# Patient Record
Sex: FEMALE | Race: WHITE | NOT HISPANIC OR LATINO | URBAN - METROPOLITAN AREA
[De-identification: names, ages, dates, MRNs, and addresses within clinical notes are randomized per-mention and may not be internally consistent; named-entity substitution may affect disease eponyms.]

---

## 2018-01-15 ENCOUNTER — EMERGENCY (EMERGENCY)
Facility: HOSPITAL | Age: 33
LOS: 1 days | Discharge: HOME | End: 2018-01-15
Admitting: EMERGENCY MEDICINE
Payer: COMMERCIAL

## 2018-01-15 VITALS
DIASTOLIC BLOOD PRESSURE: 87 MMHG | RESPIRATION RATE: 16 BRPM | TEMPERATURE: 98 F | SYSTOLIC BLOOD PRESSURE: 131 MMHG | HEART RATE: 75 BPM | OXYGEN SATURATION: 100 %

## 2018-01-15 VITALS
SYSTOLIC BLOOD PRESSURE: 155 MMHG | DIASTOLIC BLOOD PRESSURE: 99 MMHG | RESPIRATION RATE: 18 BRPM | HEART RATE: 74 BPM | OXYGEN SATURATION: 98 % | TEMPERATURE: 99 F

## 2018-01-15 LAB
APPEARANCE UR: CLEAR — SIGNIFICANT CHANGE UP
BILIRUB UR-MCNC: NEGATIVE — SIGNIFICANT CHANGE UP
COLOR SPEC: COLORLESS — SIGNIFICANT CHANGE UP
DIFF PNL FLD: NEGATIVE — SIGNIFICANT CHANGE UP
GLUCOSE UR QL: NEGATIVE — SIGNIFICANT CHANGE UP
KETONES UR-MCNC: NEGATIVE — SIGNIFICANT CHANGE UP
LEUKOCYTE ESTERASE UR-ACNC: NEGATIVE — SIGNIFICANT CHANGE UP
NITRITE UR-MCNC: NEGATIVE — SIGNIFICANT CHANGE UP
PH UR: 6 — SIGNIFICANT CHANGE UP (ref 5–8)
PROT UR-MCNC: NEGATIVE — SIGNIFICANT CHANGE UP
SP GR SPEC: 1 — LOW (ref 1.01–1.02)
UROBILINOGEN FLD QL: NEGATIVE — SIGNIFICANT CHANGE UP

## 2018-01-15 PROCEDURE — 99284 EMERGENCY DEPT VISIT MOD MDM: CPT | Mod: 25

## 2018-01-15 PROCEDURE — 81003 URINALYSIS AUTO W/O SCOPE: CPT

## 2018-01-15 PROCEDURE — 99283 EMERGENCY DEPT VISIT LOW MDM: CPT

## 2018-01-15 RX ORDER — CYCLOBENZAPRINE HYDROCHLORIDE 10 MG/1
1 TABLET, FILM COATED ORAL
Qty: 21 | Refills: 0
Start: 2018-01-15 | End: 2018-01-21

## 2018-01-15 RX ORDER — LIDOCAINE 4 G/100G
1 CREAM TOPICAL
Qty: 1 | Refills: 0
Start: 2018-01-15 | End: 2018-01-21

## 2018-01-15 RX ORDER — ACETAMINOPHEN 500 MG
650 TABLET ORAL ONCE
Qty: 0 | Refills: 0 | Status: COMPLETED | OUTPATIENT
Start: 2018-01-15 | End: 2018-01-15

## 2018-01-15 RX ORDER — LIDOCAINE 4 G/100G
1 CREAM TOPICAL ONCE
Qty: 0 | Refills: 0 | Status: COMPLETED | OUTPATIENT
Start: 2018-01-15 | End: 2018-01-15

## 2018-01-15 RX ADMIN — Medication 650 MILLIGRAM(S): at 02:42

## 2018-01-15 RX ADMIN — LIDOCAINE 1 PATCH: 4 CREAM TOPICAL at 03:30

## 2018-01-15 NOTE — ED ADULT NURSE NOTE - OBJECTIVE STATEMENT
32 year old female alert and oriented x 4 came to the ED c/o pain in the middle of her back on the right side.  Patient said she has had this pain for a few months. but it worsened tonight and she was unable to sleep.  Patient said she took  one Naproxen around 2100, 2-5mg flexeril tablets, applied heat and sat in a massage chair with slight relief of pain.  Patient reported pain was 10/10, but at time of assessment pain was 5/10 stabbing pain.  Patient's abdomen is soft and is tender in the RUQ on palpation with pain radiating to the right mid back, no CVA tenderness.  Patient denies; chest pain, shortness of breath, nausea, fevers, chills, pain or burning on urination, injury, numbness or tingling in the extremities.  Patient said pain shoots down the right legs as well and she vomited one time today secondary to pain.  Safety ensured.

## 2018-01-15 NOTE — ED PROVIDER NOTE - MEDICAL DECISION MAKING DETAILS
32F presenting with back pain - likely MSK pain with increased muscle intensity. Will check urine to check for blood. Could be kidney stone.

## 2018-01-15 NOTE — ED PROVIDER NOTE - OBJECTIVE STATEMENT
32F presenting with back pain despite the triage note. No injury to the back but has chronic back pain, usually resolved by massage and Naproxen or Aleieve. Pt did try Naproxen today with some relief. Pain is at lower back, associated with muscle spasm. Pt has nausea from pain.  Also had one episode of vomiting. No fever or chill. No diarrhea or constipation. Pt took Cyclobenzaprine before coming to ED. LMP was this past week.

## 2018-01-15 NOTE — ED PROVIDER NOTE - ATTENDING CONTRIBUTION TO CARE
I was physically present for the E/M service provided. I agree with above history, physical, and plan which I have reviewed and edited where appropriate. I was physically present for the key portions of the service provided.    32F p/w atraumatic LBP. No trauma. No fever. No mid-line TTP. No LE weakness. No paresthesia No bowel/bladder incontinence. Ambulatory. Supportive care with NSAIDs, muscle relaxers, stretching and hot compress. f/u PCP/Spine for PT. UA negative for overt UTI.

## 2022-03-21 PROBLEM — Z00.00 ENCOUNTER FOR PREVENTIVE HEALTH EXAMINATION: Status: ACTIVE | Noted: 2022-03-21

## 2022-04-08 ENCOUNTER — TRANSCRIPTION ENCOUNTER (OUTPATIENT)
Age: 37
End: 2022-04-08

## 2022-04-08 ENCOUNTER — APPOINTMENT (OUTPATIENT)
Dept: GASTROENTEROLOGY | Facility: CLINIC | Age: 37
End: 2022-04-08
Payer: COMMERCIAL

## 2022-04-08 VITALS
HEIGHT: 66 IN | WEIGHT: 222 LBS | SYSTOLIC BLOOD PRESSURE: 130 MMHG | DIASTOLIC BLOOD PRESSURE: 90 MMHG | BODY MASS INDEX: 35.68 KG/M2

## 2022-04-08 PROCEDURE — 99203 OFFICE O/P NEW LOW 30 MIN: CPT | Mod: 25

## 2022-04-08 NOTE — HISTORY OF PRESENT ILLNESS
[Heartburn] : denies heartburn [Nausea] : denies nausea [Vomiting] : denies vomiting [Diarrhea] : denies diarrhea [Constipation] : stable constipation [Yellow Skin Or Eyes (Jaundice)] : denies jaundice [Abdominal Pain] : denies abdominal pain [Abdominal Swelling] : denies abdominal swelling [Wt Gain ___ Lbs] : no recent weight gain [Wt Loss ___ Lbs] : no recent weight loss [GERD] : no gastroesophageal reflux disease [Hiatus Hernia] : no hiatus hernia [Peptic Ulcer Disease] : no peptic ulcer disease [Pancreatitis] : no pancreatitis [Cholelithiasis] : no cholelithiasis [Kidney Stone] : no kidney stone [Inflammatory Bowel Disease] : no inflammatory bowel disease [Irritable Bowel Syndrome] : no irritable bowel syndrome [Diverticulitis] : no diverticulitis [Alcohol Abuse] : no alcohol abuse [Malignancy] : no malignancy [Abdominal Surgery] : no abdominal surgery [Appendectomy] : no appendectomy [Cholecystectomy] : no cholecystectomy [de-identified] : 37 yo female, referred by Dr. Fowler for evaluation of constipation.Moves bowels 2-3 days a week.  Uses suppositories to obtain a bm, uses occasional "smooth move."  Uses occ OTC laxatives. Has no urge to move bowels.  There is no rectal bleeding or family history colorectal cancer.  She has occasional hemorrhoidal irritation

## 2022-04-08 NOTE — ASSESSMENT
[FreeTextEntry1] : 37 yo female, referred by Dr. Fowler for evaluation of constipation.Moves bowels 2-3 days a week.  Uses suppositories to obtain a bm, uses occasional "smooth move."  Uses occ OTC laxatives. Has no urge to move bowels.  There is no rectal bleeding or family history colorectal cancer.  She has occasional hemorrhoidal irritation\par \par We discussed for her to increase the fiber and fluid in her diet although she states she drinks "a lot of water".  I do not think she has any rectocele but she is never given birth of had pelvic surgery.\par \par We will prescribe Linzess await routine laboratory test and see her in follow-up in 4 weeks.  If there is no improvement consideration will be given for colonoscopy at that time.

## 2022-04-08 NOTE — CONSULT LETTER
[Dear  ___] : Dear  [unfilled], [Consult Letter:] : I had the pleasure of evaluating your patient, [unfilled]. [Please see my note below.] : Please see my note below. [Consult Closing:] : Thank you very much for allowing me to participate in the care of this patient.  If you have any questions, please do not hesitate to contact me. [Sincerely,] : Sincerely, [FreeTextEntry3] : Juan David Duran MD, FACP, AGAF, FAASLD\par  of Medicine\par [facility]\par

## 2022-04-10 ENCOUNTER — TRANSCRIPTION ENCOUNTER (OUTPATIENT)
Age: 37
End: 2022-04-10

## 2022-04-10 LAB
ALBUMIN SERPL ELPH-MCNC: 4.5 G/DL
ALP BLD-CCNC: 69 U/L
ALT SERPL-CCNC: 27 U/L
ANION GAP SERPL CALC-SCNC: 13 MMOL/L
AST SERPL-CCNC: 22 U/L
BASOPHILS # BLD AUTO: 0.05 K/UL
BASOPHILS NFR BLD AUTO: 0.6 %
BILIRUB SERPL-MCNC: <0.2 MG/DL
BUN SERPL-MCNC: 14 MG/DL
CALCIUM SERPL-MCNC: 9 MG/DL
CHLORIDE SERPL-SCNC: 103 MMOL/L
CO2 SERPL-SCNC: 23 MMOL/L
CREAT SERPL-MCNC: 0.73 MG/DL
EGFR: 111 ML/MIN/1.73M2
EOSINOPHIL # BLD AUTO: 0.11 K/UL
EOSINOPHIL NFR BLD AUTO: 1.4 %
ESTIMATED AVERAGE GLUCOSE: 126 MG/DL
GLUCOSE SERPL-MCNC: 110 MG/DL
HBA1C MFR BLD HPLC: 6 %
HCT VFR BLD CALC: 40.7 %
HGB BLD-MCNC: 13.5 G/DL
IMM GRANULOCYTES NFR BLD AUTO: 0.2 %
LYMPHOCYTES # BLD AUTO: 3.35 K/UL
LYMPHOCYTES NFR BLD AUTO: 41.5 %
MAN DIFF?: NORMAL
MCHC RBC-ENTMCNC: 28.7 PG
MCHC RBC-ENTMCNC: 33.2 GM/DL
MCV RBC AUTO: 86.6 FL
MONOCYTES # BLD AUTO: 0.55 K/UL
MONOCYTES NFR BLD AUTO: 6.8 %
NEUTROPHILS # BLD AUTO: 3.99 K/UL
NEUTROPHILS NFR BLD AUTO: 49.5 %
PLATELET # BLD AUTO: 335 K/UL
POTASSIUM SERPL-SCNC: 4.1 MMOL/L
PROT SERPL-MCNC: 7 G/DL
RBC # BLD: 4.7 M/UL
RBC # FLD: 11.9 %
SODIUM SERPL-SCNC: 139 MMOL/L
TSH SERPL-ACNC: 1.25 UIU/ML
WBC # FLD AUTO: 8.07 K/UL

## 2022-05-10 ENCOUNTER — APPOINTMENT (OUTPATIENT)
Dept: GASTROENTEROLOGY | Facility: CLINIC | Age: 37
End: 2022-05-10
Payer: COMMERCIAL

## 2022-05-10 VITALS
HEIGHT: 66 IN | WEIGHT: 226 LBS | DIASTOLIC BLOOD PRESSURE: 76 MMHG | SYSTOLIC BLOOD PRESSURE: 118 MMHG | BODY MASS INDEX: 36.32 KG/M2

## 2022-05-10 VITALS — RESPIRATION RATE: 12 BRPM | HEART RATE: 70 BPM

## 2022-05-10 PROCEDURE — 99214 OFFICE O/P EST MOD 30 MIN: CPT

## 2022-05-10 NOTE — CONSULT LETTER
[Dear  ___] : Dear  [unfilled], [Courtesy Letter:] : I had the pleasure of seeing your patient, [unfilled], in my office today. [Please see my note below.] : Please see my note below. [Consult Closing:] : Thank you very much for allowing me to participate in the care of this patient.  If you have any questions, please do not hesitate to contact me. [Sincerely,] : Sincerely, [FreeTextEntry3] : Juan David Duran MD, FACP, AGAF, FAASLD\par  of Medicine\par [facility]\par

## 2022-05-10 NOTE — HISTORY OF PRESENT ILLNESS
[Heartburn] : denies heartburn [Nausea] : denies nausea [Vomiting] : denies vomiting [Diarrhea] : denies diarrhea [Constipation] : stable constipation [Yellow Skin Or Eyes (Jaundice)] : denies jaundice [Abdominal Pain] : denies abdominal pain [Abdominal Swelling] : denies abdominal swelling [Wt Gain ___ Lbs] : no recent weight gain [Wt Loss ___ Lbs] : no recent weight loss [GERD] : no gastroesophageal reflux disease [Hiatus Hernia] : no hiatus hernia [Peptic Ulcer Disease] : no peptic ulcer disease [Pancreatitis] : no pancreatitis [Cholelithiasis] : no cholelithiasis [Kidney Stone] : no kidney stone [Inflammatory Bowel Disease] : no inflammatory bowel disease [Irritable Bowel Syndrome] : no irritable bowel syndrome [Diverticulitis] : no diverticulitis [Alcohol Abuse] : no alcohol abuse [Malignancy] : no malignancy [Abdominal Surgery] : no abdominal surgery [Appendectomy] : no appendectomy [Cholecystectomy] : no cholecystectomy [de-identified] : 37 yo female, referred by Dr. Fowler for evaluation of constipation.Moves bowels 2-3 days a week.  Uses suppositories to obtain a bm, uses occasional "smooth move."  Uses occ OTC laxatives. Has no urge to move bowels.  There is no rectal bleeding or family history colorectal cancer.  She has occasional hemorrhoidal irritation\par \par Used Linzess 72 ug with one episode of incontinence.

## 2022-05-10 NOTE — HISTORY OF PRESENT ILLNESS
[Heartburn] : denies heartburn [Nausea] : denies nausea [Vomiting] : denies vomiting [Diarrhea] : denies diarrhea [Constipation] : stable constipation [Yellow Skin Or Eyes (Jaundice)] : denies jaundice [Abdominal Pain] : denies abdominal pain [Abdominal Swelling] : denies abdominal swelling [Wt Gain ___ Lbs] : no recent weight gain [Wt Loss ___ Lbs] : no recent weight loss [GERD] : no gastroesophageal reflux disease [Hiatus Hernia] : no hiatus hernia [Peptic Ulcer Disease] : no peptic ulcer disease [Pancreatitis] : no pancreatitis [Cholelithiasis] : no cholelithiasis [Kidney Stone] : no kidney stone [Inflammatory Bowel Disease] : no inflammatory bowel disease [Irritable Bowel Syndrome] : no irritable bowel syndrome [Diverticulitis] : no diverticulitis [Alcohol Abuse] : no alcohol abuse [Malignancy] : no malignancy [Abdominal Surgery] : no abdominal surgery [Appendectomy] : no appendectomy [Cholecystectomy] : no cholecystectomy [de-identified] : 35 yo female, referred by Dr. Fowler for evaluation of constipation.Moves bowels 2-3 days a week.  Uses suppositories to obtain a bm, uses occasional "smooth move."  Uses occ OTC laxatives. Has no urge to move bowels.  There is no rectal bleeding or family history colorectal cancer.  She has occasional hemorrhoidal irritation\par \par Used Linzess 72 ug with one episode of incontinence.

## 2022-05-10 NOTE — ED ADULT NURSE NOTE - NSSISCREENINGQ1_ED_A_ED
----- Message from Rebecca Roman MD sent at 5/7/2022  1:03 PM CDT -----  Please inform patient that the recent labs shows vit D is low andd she needs to take vit D 2000 units daily and get her an appt for physical    Thank you very much.      
----- Message from Rebecca Roman MD sent at 5/7/2022  1:03 PM CDT -----  Please inform patient that the recent labs shows vit D is low andd she needs to take vit D 2000 units daily and get her an appt for physical    Thank you very much.      
Spoke w/ pt. Relayed information below. Pt verbalized understanding.  
No

## 2022-05-10 NOTE — CONSULT LETTER
[Dear  ___] : Dear  [unfilled], [Courtesy Letter:] : I had the pleasure of seeing your patient, [unfilled], in my office today. [Please see my note below.] : Please see my note below. [Consult Closing:] : Thank you very much for allowing me to participate in the care of this patient.  If you have any questions, please do not hesitate to contact me. [Sincerely,] : Sincerely, [FreeTextEntry3] : Juan David Duran MD, FACP, AGAF, FAASLD\par  of Medicine\par [facility]\par

## 2022-09-07 ENCOUNTER — EMERGENCY (EMERGENCY)
Facility: HOSPITAL | Age: 37
LOS: 1 days | Discharge: HOME | End: 2022-09-07
Attending: STUDENT IN AN ORGANIZED HEALTH CARE EDUCATION/TRAINING PROGRAM
Payer: COMMERCIAL

## 2022-09-07 ENCOUNTER — APPOINTMENT (OUTPATIENT)
Dept: ORTHOPEDIC SURGERY | Facility: CLINIC | Age: 37
End: 2022-09-07

## 2022-09-07 VITALS
WEIGHT: 220.02 LBS | DIASTOLIC BLOOD PRESSURE: 95 MMHG | HEART RATE: 108 BPM | RESPIRATION RATE: 18 BRPM | HEIGHT: 66 IN | SYSTOLIC BLOOD PRESSURE: 146 MMHG | TEMPERATURE: 98 F | OXYGEN SATURATION: 99 %

## 2022-09-07 VITALS
SYSTOLIC BLOOD PRESSURE: 123 MMHG | RESPIRATION RATE: 18 BRPM | DIASTOLIC BLOOD PRESSURE: 78 MMHG | OXYGEN SATURATION: 99 % | HEART RATE: 82 BPM

## 2022-09-07 VITALS — BODY MASS INDEX: 35.36 KG/M2 | WEIGHT: 220 LBS | HEIGHT: 66 IN

## 2022-09-07 LAB
ALBUMIN SERPL ELPH-MCNC: 4.5 G/DL — SIGNIFICANT CHANGE UP (ref 3.3–5)
ALP SERPL-CCNC: 61 U/L — SIGNIFICANT CHANGE UP (ref 40–120)
ALT FLD-CCNC: 16 U/L — SIGNIFICANT CHANGE UP (ref 10–45)
ANION GAP SERPL CALC-SCNC: 13 MMOL/L — SIGNIFICANT CHANGE UP (ref 5–17)
APTT BLD: 29 SEC — SIGNIFICANT CHANGE UP (ref 27.5–35.5)
AST SERPL-CCNC: 15 U/L — SIGNIFICANT CHANGE UP (ref 10–40)
BASOPHILS # BLD AUTO: 0.07 K/UL — SIGNIFICANT CHANGE UP (ref 0–0.2)
BASOPHILS NFR BLD AUTO: 0.5 % — SIGNIFICANT CHANGE UP (ref 0–2)
BILIRUB SERPL-MCNC: 0.4 MG/DL — SIGNIFICANT CHANGE UP (ref 0.2–1.2)
BUN SERPL-MCNC: 16 MG/DL — SIGNIFICANT CHANGE UP (ref 7–23)
CALCIUM SERPL-MCNC: 9.1 MG/DL — SIGNIFICANT CHANGE UP (ref 8.4–10.5)
CHLORIDE SERPL-SCNC: 96 MMOL/L — SIGNIFICANT CHANGE UP (ref 96–108)
CO2 SERPL-SCNC: 27 MMOL/L — SIGNIFICANT CHANGE UP (ref 22–31)
CREAT SERPL-MCNC: 0.96 MG/DL — SIGNIFICANT CHANGE UP (ref 0.5–1.3)
EGFR: 79 ML/MIN/1.73M2 — SIGNIFICANT CHANGE UP
EOSINOPHIL # BLD AUTO: 0.12 K/UL — SIGNIFICANT CHANGE UP (ref 0–0.5)
EOSINOPHIL NFR BLD AUTO: 0.9 % — SIGNIFICANT CHANGE UP (ref 0–6)
FLUAV AG NPH QL: SIGNIFICANT CHANGE UP
FLUBV AG NPH QL: SIGNIFICANT CHANGE UP
GLUCOSE SERPL-MCNC: 129 MG/DL — HIGH (ref 70–99)
HCT VFR BLD CALC: 40.7 % — SIGNIFICANT CHANGE UP (ref 34.5–45)
HGB BLD-MCNC: 13.6 G/DL — SIGNIFICANT CHANGE UP (ref 11.5–15.5)
HIV 1+2 AB+HIV1 P24 AG SERPL QL IA: SIGNIFICANT CHANGE UP
IMM GRANULOCYTES NFR BLD AUTO: 0.7 % — SIGNIFICANT CHANGE UP (ref 0–1.5)
INR BLD: 1.15 RATIO — SIGNIFICANT CHANGE UP (ref 0.88–1.16)
LYMPHOCYTES # BLD AUTO: 25.5 % — SIGNIFICANT CHANGE UP (ref 13–44)
LYMPHOCYTES # BLD AUTO: 3.38 K/UL — HIGH (ref 1–3.3)
MCHC RBC-ENTMCNC: 28.7 PG — SIGNIFICANT CHANGE UP (ref 27–34)
MCHC RBC-ENTMCNC: 33.4 GM/DL — SIGNIFICANT CHANGE UP (ref 32–36)
MCV RBC AUTO: 85.9 FL — SIGNIFICANT CHANGE UP (ref 80–100)
MONOCYTES # BLD AUTO: 0.78 K/UL — SIGNIFICANT CHANGE UP (ref 0–0.9)
MONOCYTES NFR BLD AUTO: 5.9 % — SIGNIFICANT CHANGE UP (ref 2–14)
NEUTROPHILS # BLD AUTO: 8.8 K/UL — HIGH (ref 1.8–7.4)
NEUTROPHILS NFR BLD AUTO: 66.5 % — SIGNIFICANT CHANGE UP (ref 43–77)
NRBC # BLD: 0 /100 WBCS — SIGNIFICANT CHANGE UP (ref 0–0)
PLATELET # BLD AUTO: 301 K/UL — SIGNIFICANT CHANGE UP (ref 150–400)
POTASSIUM SERPL-MCNC: 3.5 MMOL/L — SIGNIFICANT CHANGE UP (ref 3.5–5.3)
POTASSIUM SERPL-SCNC: 3.5 MMOL/L — SIGNIFICANT CHANGE UP (ref 3.5–5.3)
PROT SERPL-MCNC: 7.2 G/DL — SIGNIFICANT CHANGE UP (ref 6–8.3)
PROTHROM AB SERPL-ACNC: 13.4 SEC — SIGNIFICANT CHANGE UP (ref 10.5–13.4)
RBC # BLD: 4.74 M/UL — SIGNIFICANT CHANGE UP (ref 3.8–5.2)
RBC # FLD: 12.3 % — SIGNIFICANT CHANGE UP (ref 10.3–14.5)
RSV RNA NPH QL NAA+NON-PROBE: SIGNIFICANT CHANGE UP
SARS-COV-2 RNA SPEC QL NAA+PROBE: SIGNIFICANT CHANGE UP
SODIUM SERPL-SCNC: 136 MMOL/L — SIGNIFICANT CHANGE UP (ref 135–145)
WBC # BLD: 13.24 K/UL — HIGH (ref 3.8–10.5)
WBC # FLD AUTO: 13.24 K/UL — HIGH (ref 3.8–10.5)

## 2022-09-07 PROCEDURE — 85025 COMPLETE CBC W/AUTO DIFF WBC: CPT

## 2022-09-07 PROCEDURE — 87637 SARSCOV2&INF A&B&RSV AMP PRB: CPT

## 2022-09-07 PROCEDURE — 85610 PROTHROMBIN TIME: CPT

## 2022-09-07 PROCEDURE — 99284 EMERGENCY DEPT VISIT MOD MDM: CPT

## 2022-09-07 PROCEDURE — 87389 HIV-1 AG W/HIV-1&-2 AB AG IA: CPT

## 2022-09-07 PROCEDURE — 96374 THER/PROPH/DIAG INJ IV PUSH: CPT

## 2022-09-07 PROCEDURE — 99284 EMERGENCY DEPT VISIT MOD MDM: CPT | Mod: 25

## 2022-09-07 PROCEDURE — 80053 COMPREHEN METABOLIC PANEL: CPT

## 2022-09-07 PROCEDURE — 85730 THROMBOPLASTIN TIME PARTIAL: CPT

## 2022-09-07 PROCEDURE — 99204 OFFICE O/P NEW MOD 45 MIN: CPT

## 2022-09-07 RX ORDER — OXYCODONE HYDROCHLORIDE 5 MG/1
1 TABLET ORAL
Qty: 9 | Refills: 0
Start: 2022-09-07

## 2022-09-07 RX ORDER — OXYCODONE HYDROCHLORIDE 5 MG/1
5 TABLET ORAL ONCE
Refills: 0 | Status: DISCONTINUED | OUTPATIENT
Start: 2022-09-07 | End: 2022-09-07

## 2022-09-07 RX ORDER — DIAZEPAM 5 MG
5 TABLET ORAL ONCE
Refills: 0 | Status: DISCONTINUED | OUTPATIENT
Start: 2022-09-07 | End: 2022-09-07

## 2022-09-07 RX ORDER — ONDANSETRON 8 MG/1
4 TABLET, FILM COATED ORAL ONCE
Refills: 0 | Status: COMPLETED | OUTPATIENT
Start: 2022-09-07 | End: 2022-09-07

## 2022-09-07 RX ORDER — MORPHINE SULFATE 50 MG/1
2 CAPSULE, EXTENDED RELEASE ORAL ONCE
Refills: 0 | Status: DISCONTINUED | OUTPATIENT
Start: 2022-09-07 | End: 2022-09-07

## 2022-09-07 RX ORDER — IBUPROFEN 200 MG
600 TABLET ORAL ONCE
Refills: 0 | Status: COMPLETED | OUTPATIENT
Start: 2022-09-07 | End: 2022-09-07

## 2022-09-07 RX ORDER — CYCLOBENZAPRINE HYDROCHLORIDE 10 MG/1
1 TABLET, FILM COATED ORAL
Qty: 12 | Refills: 0
Start: 2022-09-07

## 2022-09-07 RX ADMIN — Medication 5 MILLIGRAM(S): at 09:13

## 2022-09-07 RX ADMIN — Medication 600 MILLIGRAM(S): at 09:13

## 2022-09-07 RX ADMIN — MORPHINE SULFATE 2 MILLIGRAM(S): 50 CAPSULE, EXTENDED RELEASE ORAL at 12:16

## 2022-09-07 RX ADMIN — MORPHINE SULFATE 2 MILLIGRAM(S): 50 CAPSULE, EXTENDED RELEASE ORAL at 12:45

## 2022-09-07 RX ADMIN — OXYCODONE HYDROCHLORIDE 5 MILLIGRAM(S): 5 TABLET ORAL at 09:13

## 2022-09-07 RX ADMIN — OXYCODONE HYDROCHLORIDE 5 MILLIGRAM(S): 5 TABLET ORAL at 09:35

## 2022-09-07 RX ADMIN — ONDANSETRON 4 MILLIGRAM(S): 8 TABLET, FILM COATED ORAL at 11:12

## 2022-09-07 RX ADMIN — Medication 600 MILLIGRAM(S): at 09:35

## 2022-09-07 NOTE — ED ADULT NURSE NOTE - NSIMPLEMENTINTERV_GEN_ALL_ED
Implemented All Fall Risk Interventions:  [place] to call system. Call bell, personal items and telephone within reach. Instruct patient to call for assistance. Room bathroom lighting operational. Non-slip footwear when patient is off stretcher. Physically safe environment: no spills, clutter or unnecessary equipment. Stretcher in lowest position, wheels locked, appropriate side rails in place. Provide visual cue, wrist band, yellow gown, etc. Monitor gait and stability. Monitor for mental status changes and reorient to person, place, and time. Review medications for side effects contributing to fall risk. Reinforce activity limits and safety measures with patient and family.

## 2022-09-07 NOTE — ADDENDUM
[FreeTextEntry1] : This note was written by Ralph Saucedo on 09/07/2022 acting as scribe for Dr. Jose Luis Jordan M.D.\par \par I, Jose Luis Jordan MD, have read and attest that all the information, medical decision making and discharge instructions within are true and accurate.
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
## 2022-09-07 NOTE — ED PROVIDER NOTE - NSFOLLOWUPINSTRUCTIONS_ED_ALL_ED_FT
1. Follow with your PMD within 48-72 hours.    2. Rest, no heavy lifting.  Warm compresses to area. Ortho consult today at 215pm with Dr. Jordan at [address] [place] [address].  Continue all the home medications. Ibuprofen 600mg every 6 hours with food for pain.   3. Any worsening pain, weakness, numbness, bowel or urinary incontinence return to ER

## 2022-09-07 NOTE — ED ADULT NURSE NOTE - NS ED NURSE LEVEL OF CONSCIOUSNESS ORIENTATION
Patient had a procedure yesterday. She states that it is extremely difficult for her to urinate and is wondering if this is normal.     Thank you    Oriented - self; Oriented - place; Oriented - time

## 2022-09-07 NOTE — HISTORY OF PRESENT ILLNESS
[Stable] : stable [de-identified] : Pt is a 36 y/o female present for initial evaluation of lower back pain. \par Pt is a teacher, returns to work next week.\par Low back pain since June/July\par Seeing pain management.\par Has had 2 SNRB, 1st helped, 2nd did not help.\par Has had MRI. L5-S1 herniation.\par Went to [facility], given morphine.\par Has not tried chiropractic care, PT,  and acupuncture.\par Has not taken NSAIDs for pain.\par No fever chills sweats nausea vomiting no bowel or bladder dysfunction, no recent weight loss or gain no night pain. This history is in addition to the intake form that I personally reviewed.

## 2022-09-07 NOTE — ED PROVIDER NOTE - PROGRESS NOTE DETAILS
Attending (Jake Vogel D.O.):  Went to promptly eval patient once placed in a room, found to be standing in room, but patient requested some time before speaking to a provider. Attending (Jake Vogel D.O.):  Patient declined xray, CT for further eval of back pain. Original plan was reassessment after pain meds. Pain has been improving but patient adamantly requesting MRI. Explained that her physical exam w/o signs of acute spinal cord compression as she is able to ambulate w/o assistance, no lower extremity motor or sensory changes, bowel or bladder dysfunction, saddle anesthesia. Explained that we can admit her to the hospital or place in the observation unit or see if we can obtain an urgent outpatient ortho appointment. Per our refferalls coordinator, patient can be seen by Dr. Jordan today at 2:15pm which patient was agreeable with. Patient remained hemodyanmically stable. Strict return precautions given with verbal understanding expressed.

## 2022-09-07 NOTE — ED PROVIDER NOTE - ATTENDING APP SHARED VISIT CONTRIBUTION OF CARE
Attending (Jake Vogel D.O.):  I have personally seen and examined this patient. I have performed a substantive portion of the visit including all aspects of the medical decision making. Resident, fellow, and/or ACP note reviewed. I agree on the plan of care except where noted.    see mdm

## 2022-09-07 NOTE — PHYSICAL EXAM
[Normal] : Gait: normal [Arriaga's Sign] : negative Arriaga's sign [Pronator Drift] : negative pronator drift [SLR] : negative straight leg raise [de-identified] : 5 out of 5 motor strength, sensation is intact and symmetrical full range of motion flexion extension and rotation, no palpatory tenderness full range of motion of hips knees shoulders and elbows (all four extremities), no atrophy, negative straight leg raise, no pathological reflexes, no swelling, normal ambulation, no apparent distress skin is intact, no palpable lymph nodes, no upper or lower extremity instability, alert and oriented x3 and normal mood. Normal finger-to nose test.  No perineal or buttock numbness. [de-identified] : I reviewed, interpreted and clinically correlated the following outside imaging studies, \par Lumbar MRI July 2022\par L5-S1 herniation.

## 2022-09-07 NOTE — ED PROVIDER NOTE - NS ED ATTENDING STATEMENT MOD
This was a shared visit with the [facility]. I reviewed and verified the documentation and independently performed the documented:

## 2022-09-07 NOTE — ED PROVIDER NOTE - CLINICAL SUMMARY MEDICAL DECISION MAKING FREE TEXT BOX
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 Recent spinal injection for analgesia 5-6 days prior by Dr. Crowder, unsure what was injected, but patient feels pain worse after. Had a spinal injection 1 month prior which did improve pain. Bent down to get dog's ball today which may have triggered sxs. Denies lower extremity motor or sensory changes, saddles anesthesia, bowel/bladder incontinence. Denies infectious, urinary, GI sxs asides from chronic constipation. Tachycardic, likely from pain, otherwise hemodyanmically stable. Full str all 4 extrem w/ instact sensation. Steady but antalgic appearing gait. No midline spinal tenderness/stepoffs/deformities. No erythema, regions of fluctuance or crepitation. Suspect acute on chronic lower back pain. Patient w/ known pathology (herniated discs). No current indication for labs or advanced imaging. Will trial analgesics, antiinflam, muscle relaxants. Discussed w/ patient that the exam thus far does not indicate acute fracture. Patient requesting MR. Explained to patient and mom that given the exam thus far, an MRI is not warranted here in the emergency room. Patient w/ appointment w/ Dr. Patel for 3rd spinal injection on 9/15. Shared decision making: await analgesia and reexamination and continued discussion re: imaging and disposition.

## 2022-09-07 NOTE — ED PROVIDER NOTE - PHYSICAL EXAMINATION
Gen: AAO x 3, appears uncomfortable, standing  Skin: No rashes or lesions  HEENT: NC/AT, PERRLA, EOMI, MMM  Resp: unlabored CTAB  Cardiac: tachy s1s2 , no murmurs, rubs or gallops  GI: ND, +BS, Soft, NT  Ext: no pedal edema, FROM in all extremities; No midline tenderness or hematoma.   Neuro: able to ambulate in the room, attempted to get on bed but in pain, GARNER. nl sensation.

## 2022-09-07 NOTE — DISCUSSION/SUMMARY
[de-identified] : L5-S1 herniation.\par Discussed all options. \par She just had an epidural injection about 1 week ago and will follow-up with her pain management physician Dr. Crowder.\par Diclofenac.\par MRI referral.\par F/U after MRI.\par She will follow-up with a pain management physician for any narcotics and further treatment.\par We did discuss the possibly of surgery and I explained to the patient and mother that shaving down the herniated disc would help with her leg pain more than her back pain but her problem is back pain only today.\par To address her back pain she may require a fusion procedure and she like to hold off for now.\par All options discussed including rest, medicine, home exercise, acupuncture, Chiropractic care, Physical Therapy, Pain management, and last resort surgery. All questions were answered, all alternatives discussed and the patient is in complete agreement with that plan. Follow-up appointment as instructed. Any issues and the patient will call or come in sooner.
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
## 2022-09-07 NOTE — ED PROVIDER NOTE - NS ED ROS FT
Constitutional: No fever or chills  Eyes: No visual changes, eye pain or redness  HEENT: No throat pain, ear pain, nasal pain. No nose bleeding.  CV: No chest pain or lower extremity edema  Resp: No SOB no cough  GI: No abd pain. No nausea or vomiting. No diarrhea. No constipation.   : No dysuria, hematuria.   MSK: +++musculoskeletal pain  Skin: No rash  Neuro: No headache. No numbness or tingling. No weakness.

## 2022-09-07 NOTE — CHART NOTE - NSCHARTNOTEFT_GEN_A_CORE
This report was requested by: Jake Vogel | Reference #: [id number]    Others' Prescriptions  Patient Name: Sathish Waller YOB: 1985  Address: [address] [place] [id number]: Female  Rx Written	Rx Dispensed	Drug	Quantity	Days Supply	Prescriber Name	Prescriber Madelin #	Payment Method	Dispenser  07/21/2022	07/21/2022	acetaminophen-cod #3 tablet	30	7	Ju, Jose Carlos	[id number]	Insurance	[id number]  07/14/2022	07/14/2022	tramadol hcl 50 mg tablet	20	10	Ju, Jose Carlos	[id number]	Insurance	[id number]  07/07/2022	07/07/2022	alprazolam 0.5 mg tablet	20	20	Ki Tristan	[id number]	Insurance	[id number]  10/04/2021	10/06/2021	alprazolam 0.5 mg tablet	20	20	Otf Fowler	[id number]	Insurance	[id number]

## 2022-09-07 NOTE — ED PROVIDER NOTE - PATIENT PORTAL LINK FT
You can access the FollowMyHealth Patient Portal offered by [facility] by registering at the following website: [email or web address]. By joining [employer]’s FollowMyHealth portal, you will also be able to view your health information using other applications (apps) compatible with our system.

## 2022-09-08 ENCOUNTER — APPOINTMENT (OUTPATIENT)
Dept: SPINE | Facility: CLINIC | Age: 37
End: 2022-09-08

## 2022-09-08 VITALS
SYSTOLIC BLOOD PRESSURE: 115 MMHG | OXYGEN SATURATION: 97 % | HEIGHT: 66 IN | DIASTOLIC BLOOD PRESSURE: 83 MMHG | BODY MASS INDEX: 35.36 KG/M2 | HEART RATE: 103 BPM | WEIGHT: 220 LBS

## 2022-09-08 DIAGNOSIS — R11.0 NAUSEA: ICD-10-CM

## 2022-09-08 PROBLEM — M51.26 OTHER INTERVERTEBRAL DISC DISPLACEMENT, LUMBAR REGION: Chronic | Status: ACTIVE | Noted: 2022-09-07

## 2022-09-08 PROCEDURE — 99203 OFFICE O/P NEW LOW 30 MIN: CPT

## 2022-09-08 RX ORDER — ESCITALOPRAM OXALATE 5 MG/1
5 TABLET, FILM COATED ORAL
Refills: 0 | Status: DISCONTINUED | COMMUNITY
End: 2022-09-08

## 2022-09-08 RX ORDER — LINACLOTIDE 72 UG/1
72 CAPSULE, GELATIN COATED ORAL
Qty: 30 | Refills: 3 | Status: DISCONTINUED | COMMUNITY
Start: 2022-04-08 | End: 2022-09-08

## 2022-09-08 RX ORDER — LINACLOTIDE 145 UG/1
145 CAPSULE, GELATIN COATED ORAL
Qty: 90 | Refills: 3 | Status: DISCONTINUED | COMMUNITY
Start: 2022-05-16 | End: 2022-09-08

## 2022-09-08 NOTE — REVIEW OF SYSTEMS
[Difficulty Walking] : difficulty walking [Negative] : Heme/Lymph [Constipation] : constipation [Joint Pain] : joint pain [Limb Pain] : limb pain [As Noted in HPI] : as noted in HPI [de-identified] : pre diabetic

## 2022-09-08 NOTE — CONSULT LETTER
[Dear  ___] : Dear  [unfilled], [Consult Letter:] : I had the pleasure of evaluating your patient, [unfilled]. [Please see my note below.] : Please see my note below. [Consult Closing:] : Thank you very much for allowing me to participate in the care of this patient.  If you have any questions, please do not hesitate to contact me. [Sincerely,] : Sincerely, [FreeTextEntry2] : Otf Fowler M.D.\par [address]\par [place], [place]  [address] [FreeTextEntry1] : Sathish Walelr\par  1985 [FreeTextEntry3] : JASON Nino\par Nurse Practitioner\par Neurosurgery and Spine Department\par [facility]\par Nurse Practitioner in the office with Dr. Austin Patricio M.D.
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
## 2022-09-08 NOTE — ASSESSMENT
[FreeTextEntry1] : The images of the MRI were reviewed and discussed with the patient and her mother.  It was discussed that the disc may be putting pressure on the S1 nerve roots, causing her pain.  A new MRI of the lumbar spine was ordered to evaluated if there is any worsening of the stenosis.  She was provided with a prescription for Zofran to ease her nausea from the pain medication. Dr. Patricio was made aware and viewed the MRI.  The patient is to return to the office on Monday to discuss her plan of care.

## 2022-09-08 NOTE — PHYSICAL EXAM
[General Appearance - Alert] : alert [General Appearance - Well Nourished] : well nourished [General Appearance - Well Developed] : well developed [General Appearance - Well-Appearing] : healthy appearing [] : normal voice and communication [Oriented To Time, Place, And Person] : oriented to person, place, and time [Impaired Insight] : insight and judgment were intact [Affect] : the affect was normal [Mood] : the mood was normal [Memory Recent] : recent memory was not impaired [Memory Remote] : remote memory was not impaired [Person] : oriented to person [Place] : oriented to place [Time] : oriented to time [Short Term Intact] : short term memory intact [Remote Intact] : remote memory intact [Span Intact] : the attention span was normal [Concentration Intact] : normal concentrating ability [Fluency] : fluency intact [Comprehension] : comprehension intact [Current Events] : adequate knowledge of current events [Past History] : adequate knowledge of personal past history [Vocabulary] : adequate range of vocabulary [Cranial Nerves Optic (II)] : visual acuity intact bilaterally,  pupils equal round and reactive to light [Cranial Nerves Oculomotor (III)] : extraocular motion intact [Cranial Nerves Trigeminal (V)] : facial sensation intact symmetrically [Cranial Nerves Facial (VII)] : face symmetrical [Cranial Nerves Vestibulocochlear (VIII)] : hearing was intact bilaterally [Cranial Nerves Glossopharyngeal (IX)] : tongue and palate midline [Cranial Nerves Accessory (XI - Cranial And Spinal)] : head turning and shoulder shrug symmetric [Cranial Nerves Hypoglossal (XII)] : there was no tongue deviation with protrusion [Motor Tone] : muscle tone was normal in all four extremities [Motor Strength] : muscle strength was normal in all four extremities [No Muscle Atrophy] : normal bulk in all four extremities [5] : S1 toe walking 5/5 [Sensation Tactile Decrease] : light touch was intact [Balance] : balance was intact [Past-pointing] : there was no past-pointing [Tremor] : no tremor present [2+] : Patella left 2+ [1+] : Ankle jerk left 1+ [___] : absent on the right [___] : absent on the left [Arriaga] : Arriaga's sign was not demonstrated [FreeTextEntry1] : Pain with lifting left leg with knee bent and straight leg raising.  Pain with walking and difficulty sitting for any length of time. [FreeTextEntry8] : stiff gait related to back and left leg pain.

## 2022-09-08 NOTE — REASON FOR VISIT
[New Patient Visit] : a new patient visit [Referred By: _________] : Patient was referred by FRANK [Parent] : parent [FreeTextEntry1] : The patient reports having left sided back pain which radiates into her left buttock to her calf.

## 2022-09-08 NOTE — RESULTS/DATA
[FreeTextEntry1] : Large central herniated disc causing narrowing of the lateral recesses which is worse on the left than the right.

## 2022-09-08 NOTE — DATA REVIEWED
[de-identified] : Lumbar without contrast done at [facility] on 07/15/2022.

## 2022-09-09 ENCOUNTER — INPATIENT (INPATIENT)
Facility: HOSPITAL | Age: 37
LOS: 5 days | Discharge: HOME HEALTH | DRG: 520 | End: 2022-09-15
Attending: NEUROLOGICAL SURGERY | Admitting: INTERNAL MEDICINE
Payer: COMMERCIAL

## 2022-09-09 VITALS
TEMPERATURE: 98 F | HEART RATE: 92 BPM | HEIGHT: 66 IN | WEIGHT: 220.02 LBS | DIASTOLIC BLOOD PRESSURE: 89 MMHG | SYSTOLIC BLOOD PRESSURE: 147 MMHG | RESPIRATION RATE: 20 BRPM | OXYGEN SATURATION: 97 %

## 2022-09-09 DIAGNOSIS — M51.26 OTHER INTERVERTEBRAL DISC DISPLACEMENT, LUMBAR REGION: ICD-10-CM

## 2022-09-09 DIAGNOSIS — R09.89 OTHER SPECIFIED SYMPTOMS AND SIGNS INVOLVING THE CIRCULATORY AND RESPIRATORY SYSTEMS: ICD-10-CM

## 2022-09-09 LAB
ALBUMIN SERPL ELPH-MCNC: 4.2 G/DL — SIGNIFICANT CHANGE UP (ref 3.3–5)
ALP SERPL-CCNC: 60 U/L — SIGNIFICANT CHANGE UP (ref 40–120)
ALT FLD-CCNC: 15 U/L — SIGNIFICANT CHANGE UP (ref 10–45)
ANION GAP SERPL CALC-SCNC: 10 MMOL/L — SIGNIFICANT CHANGE UP (ref 5–17)
APTT BLD: 28 SEC — SIGNIFICANT CHANGE UP (ref 27.5–35.5)
AST SERPL-CCNC: 16 U/L — SIGNIFICANT CHANGE UP (ref 10–40)
BASOPHILS # BLD AUTO: 0.08 K/UL — SIGNIFICANT CHANGE UP (ref 0–0.2)
BASOPHILS NFR BLD AUTO: 0.7 % — SIGNIFICANT CHANGE UP (ref 0–2)
BILIRUB SERPL-MCNC: 0.3 MG/DL — SIGNIFICANT CHANGE UP (ref 0.2–1.2)
BLD GP AB SCN SERPL QL: NEGATIVE — SIGNIFICANT CHANGE UP
BUN SERPL-MCNC: 13 MG/DL — SIGNIFICANT CHANGE UP (ref 7–23)
CALCIUM SERPL-MCNC: 9.2 MG/DL — SIGNIFICANT CHANGE UP (ref 8.4–10.5)
CHLORIDE SERPL-SCNC: 100 MMOL/L — SIGNIFICANT CHANGE UP (ref 96–108)
CO2 SERPL-SCNC: 26 MMOL/L — SIGNIFICANT CHANGE UP (ref 22–31)
CREAT SERPL-MCNC: 0.8 MG/DL — SIGNIFICANT CHANGE UP (ref 0.5–1.3)
EGFR: 98 ML/MIN/1.73M2 — SIGNIFICANT CHANGE UP
EOSINOPHIL # BLD AUTO: 0.07 K/UL — SIGNIFICANT CHANGE UP (ref 0–0.5)
EOSINOPHIL NFR BLD AUTO: 0.6 % — SIGNIFICANT CHANGE UP (ref 0–6)
GLUCOSE SERPL-MCNC: 96 MG/DL — SIGNIFICANT CHANGE UP (ref 70–99)
HCG SERPL-ACNC: <2 MIU/ML — SIGNIFICANT CHANGE UP
HCT VFR BLD CALC: 41.6 % — SIGNIFICANT CHANGE UP (ref 34.5–45)
HGB BLD-MCNC: 13.7 G/DL — SIGNIFICANT CHANGE UP (ref 11.5–15.5)
IMM GRANULOCYTES NFR BLD AUTO: 0.9 % — SIGNIFICANT CHANGE UP (ref 0–1.5)
INR BLD: 1.12 RATIO — SIGNIFICANT CHANGE UP (ref 0.88–1.16)
LYMPHOCYTES # BLD AUTO: 2.9 K/UL — SIGNIFICANT CHANGE UP (ref 1–3.3)
LYMPHOCYTES # BLD AUTO: 24.8 % — SIGNIFICANT CHANGE UP (ref 13–44)
MCHC RBC-ENTMCNC: 28.5 PG — SIGNIFICANT CHANGE UP (ref 27–34)
MCHC RBC-ENTMCNC: 32.9 GM/DL — SIGNIFICANT CHANGE UP (ref 32–36)
MCV RBC AUTO: 86.7 FL — SIGNIFICANT CHANGE UP (ref 80–100)
MONOCYTES # BLD AUTO: 0.75 K/UL — SIGNIFICANT CHANGE UP (ref 0–0.9)
MONOCYTES NFR BLD AUTO: 6.4 % — SIGNIFICANT CHANGE UP (ref 2–14)
NEUTROPHILS # BLD AUTO: 7.8 K/UL — HIGH (ref 1.8–7.4)
NEUTROPHILS NFR BLD AUTO: 66.6 % — SIGNIFICANT CHANGE UP (ref 43–77)
NRBC # BLD: 0 /100 WBCS — SIGNIFICANT CHANGE UP (ref 0–0)
PLATELET # BLD AUTO: 302 K/UL — SIGNIFICANT CHANGE UP (ref 150–400)
POTASSIUM SERPL-MCNC: 3.9 MMOL/L — SIGNIFICANT CHANGE UP (ref 3.5–5.3)
POTASSIUM SERPL-SCNC: 3.9 MMOL/L — SIGNIFICANT CHANGE UP (ref 3.5–5.3)
PROT SERPL-MCNC: 7.2 G/DL — SIGNIFICANT CHANGE UP (ref 6–8.3)
PROTHROM AB SERPL-ACNC: 13 SEC — SIGNIFICANT CHANGE UP (ref 10.5–13.4)
RBC # BLD: 4.8 M/UL — SIGNIFICANT CHANGE UP (ref 3.8–5.2)
RBC # FLD: 12.2 % — SIGNIFICANT CHANGE UP (ref 10.3–14.5)
RH IG SCN BLD-IMP: POSITIVE — SIGNIFICANT CHANGE UP
SARS-COV-2 RNA SPEC QL NAA+PROBE: SIGNIFICANT CHANGE UP
SODIUM SERPL-SCNC: 136 MMOL/L — SIGNIFICANT CHANGE UP (ref 135–145)
WBC # BLD: 11.7 K/UL — HIGH (ref 3.8–10.5)
WBC # FLD AUTO: 11.7 K/UL — HIGH (ref 3.8–10.5)

## 2022-09-09 PROCEDURE — 93010 ELECTROCARDIOGRAM REPORT: CPT

## 2022-09-09 PROCEDURE — 72148 MRI LUMBAR SPINE W/O DYE: CPT | Mod: 26

## 2022-09-09 PROCEDURE — 99223 1ST HOSP IP/OBS HIGH 75: CPT

## 2022-09-09 PROCEDURE — 99285 EMERGENCY DEPT VISIT HI MDM: CPT

## 2022-09-09 RX ORDER — ACETAMINOPHEN 500 MG
650 TABLET ORAL EVERY 6 HOURS
Refills: 0 | Status: DISCONTINUED | OUTPATIENT
Start: 2022-09-09 | End: 2022-09-13

## 2022-09-09 RX ORDER — LIDOCAINE 4 G/100G
1 CREAM TOPICAL ONCE
Refills: 0 | Status: COMPLETED | OUTPATIENT
Start: 2022-09-09 | End: 2022-09-09

## 2022-09-09 RX ORDER — SENNA PLUS 8.6 MG/1
2 TABLET ORAL AT BEDTIME
Refills: 0 | Status: DISCONTINUED | OUTPATIENT
Start: 2022-09-09 | End: 2022-09-13

## 2022-09-09 RX ORDER — METHOCARBAMOL 500 MG/1
1500 TABLET, FILM COATED ORAL ONCE
Refills: 0 | Status: COMPLETED | OUTPATIENT
Start: 2022-09-09 | End: 2022-09-09

## 2022-09-09 RX ORDER — ENOXAPARIN SODIUM 100 MG/ML
40 INJECTION SUBCUTANEOUS EVERY 24 HOURS
Refills: 0 | Status: DISCONTINUED | OUTPATIENT
Start: 2022-09-09 | End: 2022-09-10

## 2022-09-09 RX ORDER — MORPHINE SULFATE 50 MG/1
6 CAPSULE, EXTENDED RELEASE ORAL ONCE
Refills: 0 | Status: DISCONTINUED | OUTPATIENT
Start: 2022-09-09 | End: 2022-09-09

## 2022-09-09 RX ORDER — METOCLOPRAMIDE HCL 10 MG
10 TABLET ORAL ONCE
Refills: 0 | Status: COMPLETED | OUTPATIENT
Start: 2022-09-09 | End: 2022-09-09

## 2022-09-09 RX ORDER — MORPHINE SULFATE 50 MG/1
10 CAPSULE, EXTENDED RELEASE ORAL ONCE
Refills: 0 | Status: DISCONTINUED | OUTPATIENT
Start: 2022-09-09 | End: 2022-09-09

## 2022-09-09 RX ORDER — DEXAMETHASONE 0.5 MG/5ML
2 ELIXIR ORAL THREE TIMES A DAY
Refills: 0 | Status: DISCONTINUED | OUTPATIENT
Start: 2022-09-09 | End: 2022-09-10

## 2022-09-09 RX ORDER — DEXAMETHASONE 0.5 MG/5ML
10 ELIXIR ORAL ONCE
Refills: 0 | Status: COMPLETED | OUTPATIENT
Start: 2022-09-09 | End: 2022-09-10

## 2022-09-09 RX ORDER — ACETAMINOPHEN 500 MG
975 TABLET ORAL ONCE
Refills: 0 | Status: COMPLETED | OUTPATIENT
Start: 2022-09-09 | End: 2022-09-09

## 2022-09-09 RX ORDER — POLYETHYLENE GLYCOL 3350 17 G/17G
17 POWDER, FOR SOLUTION ORAL DAILY
Refills: 0 | Status: DISCONTINUED | OUTPATIENT
Start: 2022-09-09 | End: 2022-09-13

## 2022-09-09 RX ORDER — ONDANSETRON 8 MG/1
4 TABLET, FILM COATED ORAL EVERY 6 HOURS
Refills: 0 | Status: DISCONTINUED | OUTPATIENT
Start: 2022-09-09 | End: 2022-09-13

## 2022-09-09 RX ORDER — OXYCODONE HYDROCHLORIDE 5 MG/1
10 TABLET ORAL EVERY 4 HOURS
Refills: 0 | Status: DISCONTINUED | OUTPATIENT
Start: 2022-09-09 | End: 2022-09-13

## 2022-09-09 RX ORDER — OXYCODONE HYDROCHLORIDE 5 MG/1
5 TABLET ORAL EVERY 4 HOURS
Refills: 0 | Status: DISCONTINUED | OUTPATIENT
Start: 2022-09-09 | End: 2022-09-13

## 2022-09-09 RX ORDER — DEXAMETHASONE 0.5 MG/5ML
10 ELIXIR ORAL ONCE
Refills: 0 | Status: DISCONTINUED | OUTPATIENT
Start: 2022-09-09 | End: 2022-09-09

## 2022-09-09 RX ADMIN — Medication 10 MILLIGRAM(S): at 20:43

## 2022-09-09 RX ADMIN — Medication 150 MILLIGRAM(S): at 11:52

## 2022-09-09 RX ADMIN — MORPHINE SULFATE 10 MILLIGRAM(S): 50 CAPSULE, EXTENDED RELEASE ORAL at 14:14

## 2022-09-09 RX ADMIN — OXYCODONE HYDROCHLORIDE 10 MILLIGRAM(S): 5 TABLET ORAL at 20:42

## 2022-09-09 RX ADMIN — Medication 975 MILLIGRAM(S): at 11:52

## 2022-09-09 RX ADMIN — ONDANSETRON 4 MILLIGRAM(S): 8 TABLET, FILM COATED ORAL at 18:06

## 2022-09-09 RX ADMIN — METHOCARBAMOL 1500 MILLIGRAM(S): 500 TABLET, FILM COATED ORAL at 11:52

## 2022-09-09 RX ADMIN — MORPHINE SULFATE 6 MILLIGRAM(S): 50 CAPSULE, EXTENDED RELEASE ORAL at 13:00

## 2022-09-09 NOTE — ED PROVIDER NOTE - CLINICAL SUMMARY MEDICAL DECISION MAKING FREE TEXT BOX
Dr. Sims Note: acute worsening of LBP with L neuropathy symptoms of reported weakness and paresthesia, concern for worsening disc impingement syndrome, will consult pt's neurosurgeon, pain meds and will require inpatient management for pain control, MRI lumbar spine, possible intervention, and PT.

## 2022-09-09 NOTE — ED PROVIDER NOTE - OBJECTIVE STATEMENT
37y F pmhx chronic constipation on linzess, chronic low back pain 2/2 known L5/S1 disc herniation (MRI 7/22), returns to ED c/o worsening acute on chronic low back pain. Pt seen in ED w/ same complaint 2 days ago. Now w/ worsening pain radiating down L leg w/ LLE numbness and tingling that started this morning while sitting on toilet. Seen by ortho spine 2 days ago and neurosx Dr. Patricio office yesterday for expedited MRI. Called [facility] office today w/ worsening symptoms and referred to ER. Denies trauma, f/c, abd pain, saddle anesthesia, bowel or bladder dysfunction.

## 2022-09-09 NOTE — ED ADULT NURSE NOTE - NSFALLRSKASSISTTYPE_ED_ALL_ED

## 2022-09-09 NOTE — ED PROVIDER NOTE - PHYSICAL EXAMINATION
GEN: Pt non-toxic in mild distress 2/2 pain uncomfortable appearing on stretcher. A&Ox3.  PSYCH: Affect and mood appropriate.  EYES: Sclera white w/o injection, EOMI, PERRLA.  ENT: Neck supple FROM. Airway patent.  RESP: CTA b/l, no wheezes, rales, or rhonchi.   CARDIAC: RRR, clear distinct S1, S2, no appreciable murmurs.  ABD: Abdomen soft, non-tender. No CVAT b/l.  MSK: No obvious spinal deformity, no midline spinal ttp, no step-offs. +SLR L leg. Decreased sensation LLE compared to RLE. LLE strength 4/5. RLE 5/5 but limited 2/2 LBP.  NEURO: See MSK. No focal motor or sensory deficits.  VASC: Dorsalis pedis pulses 2+ b/l. No edema or calf tenderness.  SKIN: No rashes or lesions.

## 2022-09-09 NOTE — ED PROVIDER NOTE - PROGRESS NOTE DETAILS
Boston Myers PA-C: nsx consulted by Bethany, will see in ED Boston Myers PA-C: s/w nsx - rec med admission for MRI and pain ctrl. Not rec steroids at this time. Pt endorsed to Dr. Sean Tong. Boston Myers PA-C: pt seen by carlos Patricio requesting admission to his service

## 2022-09-09 NOTE — H&P ADULT - HISTORY OF PRESENT ILLNESS
37F patient of Dr. Patricio, p/w 2 days of worsening severe LBP radiating down posterior LLE w/numbness/paresthesia. No bowel/urine incontinence, no saddle anesthesia. MRI from 7/15 shows a disc herniation at L5-S1 with central and L foraminal stenosis. Patient has significant LBP with any movement.

## 2022-09-09 NOTE — H&P ADULT - NSHPPHYSICALEXAM_GEN_ALL_CORE
Exam: AO3, PERRL, EOMI, BUE 5/5, RLE 5/5, L HF and KE 4/5 (pain limited), L PF/DF 5/5. Decreased sensation along L calf, plantar surface of L foot, and R hip. No clonus/coelho, good rectal tone, no midline tenderness

## 2022-09-09 NOTE — CHART NOTE - NSCHARTNOTEFT_GEN_A_CORE
Called by the nurse to report that patient has not voided in > 6hours. Bladder scan noted with 669ml  of retained urine. Patient is refusing straight cath at present and would like to attempt to void independently. Nurse will re-attempt to straight cath if she doesnot void.

## 2022-09-09 NOTE — ED ADULT NURSE NOTE - OBJECTIVE STATEMENT
37F, AAO3, BIBEMS from home c/o severe lower back pain worsening since Wednesday. Reports unable to ambulate due to pain. Pt reports Fall in April then diagnosed with known herniated lumbar disc on MRI in July. Denies recent fall/injury. Taking Oxycodone 5mg, Cyclobenzaprine 10mg and Diclofenac 75mg at home for pain. Given 100mcg Fentanyl by EMS. Reporting tingling to LLE for the past few days, able to move toes, +PP.

## 2022-09-09 NOTE — H&P ADULT - ASSESSMENT
Sathish Waller   37F patient of Dr. Patricio, p/w 2 days of worsening severe LBP radiating down posterior LLE w/numbness/paresthesia. No bowel/urine incontinence, no saddle anesthesia. MRI from 7/15 shows a disc herniation at L5-S1 with central and L foraminal stenosis. Patient has significant LBP with any movement. Exam: AO3, PERRL, EOMI, BUE 5/5, RLE 5/5, L HF and KE 4/5 (pain limited), L PF/DF 5/5. Decreased sensation along L calf, plantar surface of L foot, and R hip. No clonus/coelho, good rectal tone, no midline tenderness.    - Plan for surgery Tuesday for L5-S1 laminectomy and discectomy  - MR L spine  - Dex 10 now and 2q8(watch sugars, prediabetic)  - Pain control

## 2022-09-10 DIAGNOSIS — Z01.818 ENCOUNTER FOR OTHER PREPROCEDURAL EXAMINATION: ICD-10-CM

## 2022-09-10 DIAGNOSIS — M54.9 DORSALGIA, UNSPECIFIED: ICD-10-CM

## 2022-09-10 DIAGNOSIS — D72.829 ELEVATED WHITE BLOOD CELL COUNT, UNSPECIFIED: ICD-10-CM

## 2022-09-10 PROCEDURE — 99233 SBSQ HOSP IP/OBS HIGH 50: CPT

## 2022-09-10 RX ORDER — CYCLOBENZAPRINE HYDROCHLORIDE 10 MG/1
10 TABLET, FILM COATED ORAL THREE TIMES A DAY
Refills: 0 | Status: DISCONTINUED | OUTPATIENT
Start: 2022-09-10 | End: 2022-09-13

## 2022-09-10 RX ORDER — DIAZEPAM 5 MG
5 TABLET ORAL ONCE
Refills: 0 | Status: DISCONTINUED | OUTPATIENT
Start: 2022-09-10 | End: 2022-09-10

## 2022-09-10 RX ORDER — MULTIVIT WITH MIN/MFOLATE/K2 340-15/3 G
1 POWDER (GRAM) ORAL ONCE
Refills: 0 | Status: DISCONTINUED | OUTPATIENT
Start: 2022-09-10 | End: 2022-09-10

## 2022-09-10 RX ORDER — ENOXAPARIN SODIUM 100 MG/ML
30 INJECTION SUBCUTANEOUS EVERY 12 HOURS
Refills: 0 | Status: DISCONTINUED | OUTPATIENT
Start: 2022-09-10 | End: 2022-09-12

## 2022-09-10 RX ORDER — DIAZEPAM 5 MG
5 TABLET ORAL ONCE
Refills: 0 | Status: DISCONTINUED | OUTPATIENT
Start: 2022-09-10 | End: 2022-09-11

## 2022-09-10 RX ORDER — METHOCARBAMOL 500 MG/1
500 TABLET, FILM COATED ORAL THREE TIMES A DAY
Refills: 0 | Status: DISCONTINUED | OUTPATIENT
Start: 2022-09-10 | End: 2022-09-13

## 2022-09-10 RX ORDER — MORPHINE SULFATE 50 MG/1
2 CAPSULE, EXTENDED RELEASE ORAL ONCE
Refills: 0 | Status: DISCONTINUED | OUTPATIENT
Start: 2022-09-10 | End: 2022-09-10

## 2022-09-10 RX ORDER — DEXAMETHASONE 0.5 MG/5ML
3 ELIXIR ORAL EVERY 6 HOURS
Refills: 0 | Status: DISCONTINUED | OUTPATIENT
Start: 2022-09-10 | End: 2022-09-10

## 2022-09-10 RX ORDER — DEXAMETHASONE 0.5 MG/5ML
4 ELIXIR ORAL EVERY 6 HOURS
Refills: 0 | Status: DISCONTINUED | OUTPATIENT
Start: 2022-09-10 | End: 2022-09-13

## 2022-09-10 RX ORDER — LINACLOTIDE 145 UG/1
145 CAPSULE, GELATIN COATED ORAL
Refills: 0 | Status: DISCONTINUED | OUTPATIENT
Start: 2022-09-10 | End: 2022-09-13

## 2022-09-10 RX ORDER — HYDROMORPHONE HYDROCHLORIDE 2 MG/ML
1 INJECTION INTRAMUSCULAR; INTRAVENOUS; SUBCUTANEOUS ONCE
Refills: 0 | Status: DISCONTINUED | OUTPATIENT
Start: 2022-09-10 | End: 2022-09-10

## 2022-09-10 RX ADMIN — METHOCARBAMOL 500 MILLIGRAM(S): 500 TABLET, FILM COATED ORAL at 16:07

## 2022-09-10 RX ADMIN — OXYCODONE HYDROCHLORIDE 10 MILLIGRAM(S): 5 TABLET ORAL at 10:03

## 2022-09-10 RX ADMIN — MORPHINE SULFATE 2 MILLIGRAM(S): 50 CAPSULE, EXTENDED RELEASE ORAL at 21:39

## 2022-09-10 RX ADMIN — ENOXAPARIN SODIUM 30 MILLIGRAM(S): 100 INJECTION SUBCUTANEOUS at 19:29

## 2022-09-10 RX ADMIN — Medication 650 MILLIGRAM(S): at 13:33

## 2022-09-10 RX ADMIN — Medication 2 MILLIGRAM(S): at 08:31

## 2022-09-10 RX ADMIN — Medication 650 MILLIGRAM(S): at 08:32

## 2022-09-10 RX ADMIN — Medication 2 MILLIGRAM(S): at 16:09

## 2022-09-10 RX ADMIN — HYDROMORPHONE HYDROCHLORIDE 1 MILLIGRAM(S): 2 INJECTION INTRAMUSCULAR; INTRAVENOUS; SUBCUTANEOUS at 01:00

## 2022-09-10 RX ADMIN — ONDANSETRON 4 MILLIGRAM(S): 8 TABLET, FILM COATED ORAL at 10:34

## 2022-09-10 RX ADMIN — Medication 5 MILLIGRAM(S): at 14:21

## 2022-09-10 RX ADMIN — HYDROMORPHONE HYDROCHLORIDE 1 MILLIGRAM(S): 2 INJECTION INTRAMUSCULAR; INTRAVENOUS; SUBCUTANEOUS at 00:44

## 2022-09-10 RX ADMIN — Medication 102 MILLIGRAM(S): at 00:44

## 2022-09-10 RX ADMIN — MORPHINE SULFATE 2 MILLIGRAM(S): 50 CAPSULE, EXTENDED RELEASE ORAL at 10:32

## 2022-09-10 RX ADMIN — CYCLOBENZAPRINE HYDROCHLORIDE 10 MILLIGRAM(S): 10 TABLET, FILM COATED ORAL at 02:12

## 2022-09-10 RX ADMIN — OXYCODONE HYDROCHLORIDE 10 MILLIGRAM(S): 5 TABLET ORAL at 16:30

## 2022-09-10 RX ADMIN — POLYETHYLENE GLYCOL 3350 17 GRAM(S): 17 POWDER, FOR SOLUTION ORAL at 13:28

## 2022-09-10 RX ADMIN — OXYCODONE HYDROCHLORIDE 10 MILLIGRAM(S): 5 TABLET ORAL at 20:54

## 2022-09-10 RX ADMIN — OXYCODONE HYDROCHLORIDE 10 MILLIGRAM(S): 5 TABLET ORAL at 15:43

## 2022-09-10 RX ADMIN — Medication 650 MILLIGRAM(S): at 14:03

## 2022-09-10 RX ADMIN — Medication 5 MILLIGRAM(S): at 08:42

## 2022-09-10 RX ADMIN — CYCLOBENZAPRINE HYDROCHLORIDE 10 MILLIGRAM(S): 10 TABLET, FILM COATED ORAL at 17:44

## 2022-09-10 RX ADMIN — MORPHINE SULFATE 2 MILLIGRAM(S): 50 CAPSULE, EXTENDED RELEASE ORAL at 22:09

## 2022-09-10 RX ADMIN — CYCLOBENZAPRINE HYDROCHLORIDE 10 MILLIGRAM(S): 10 TABLET, FILM COATED ORAL at 13:32

## 2022-09-10 RX ADMIN — SENNA PLUS 2 TABLET(S): 8.6 TABLET ORAL at 21:39

## 2022-09-10 RX ADMIN — OXYCODONE HYDROCHLORIDE 10 MILLIGRAM(S): 5 TABLET ORAL at 09:33

## 2022-09-10 RX ADMIN — OXYCODONE HYDROCHLORIDE 10 MILLIGRAM(S): 5 TABLET ORAL at 20:24

## 2022-09-10 RX ADMIN — ONDANSETRON 4 MILLIGRAM(S): 8 TABLET, FILM COATED ORAL at 01:54

## 2022-09-10 NOTE — CONSULT NOTE ADULT - SUBJECTIVE AND OBJECTIVE BOX
DATE OF SERVICE: 09-10-22 @ 11:51    Patient is a 37y old  Female who presents with a chief complaint of low back pain     HPI:  37F with PMH chronic LBP and L/S radiculopathy admitted with 2 days of worsening severe LBP radiating down posterior LLE w/numbness/paresthesia. No bowel/urine incontinence, no saddle anesthesia. MRI from 7/15 shows a disc herniation at L5-S1 with central and L foraminal stenosis. Patient has significant LBP with any movement. This AM reports paresthesias and tingling in both calves knee downwards    PAST MEDICAL & SURGICAL HISTORY:  Lumbar herniated disc      No significant past surgical history          Review of Systems:   CONSTITUTIONAL: No fever, weight loss, or fatigue  EYES: No eye pain, visual disturbances, or discharge  ENMT:  No difficulty hearing, tinnitus, vertigo; No sinus or throat pain  NECK: No pain or stiffness  RESPIRATORY: No cough, wheezing, chills or hemoptysis; No shortness of breath  CARDIOVASCULAR: No chest pain, palpitations, dizziness, leg swelling or sob  GASTROINTESTINAL: No abdominal pain. No nausea, vomiting, diarrhea or constipation  GENITOURINARY: No dysuria, frequency, hematuria, or incontinence  NEUROLOGICAL: see above HPI No headaches, memory loss, loss of strength, numbness, or tremors  SKIN: No itching, burning, rashes, or lesions   LYMPH NODES: No enlarged glands  ENDOCRINE: No heat or cold intolerance; No hair loss  MUSCULOSKELETAL: No joint pain or swelling; No muscle, back, or extremity pain  PSYCHIATRIC: No depression, anxiety, mood swings, or difficulty sleeping  HEME/LYMPH: No easy bruising, or bleeding gums  ALLERGY AND IMMUNOLOGIC: No hives or eczema    Allergies    No Known Allergies    Social History: non smoker  no IVDA  no ETOH abuse   lives with family     FAMILY HISTORY: no DM HTN CAD Cancer       MEDICATIONS  (STANDING):  dexAMETHasone  Injectable 2 milliGRAM(s) IV Push three times a day  enoxaparin Injectable 30 milliGRAM(s) SubCutaneous every 12 hours  polyethylene glycol 3350 17 Gram(s) Oral daily  senna 2 Tablet(s) Oral at bedtime    MEDICATIONS  (PRN):  acetaminophen     Tablet .. 650 milliGRAM(s) Oral every 6 hours PRN Temp greater or equal to 38C (100.4F), Mild Pain (1 - 3)  bisacodyl 5 milliGRAM(s) Oral every 12 hours PRN Constipation  cyclobenzaprine 10 milliGRAM(s) Oral three times a day PRN Muscle Spasm  ondansetron Injectable 4 milliGRAM(s) IV Push every 6 hours PRN Nausea and/or Vomiting  oxyCODONE    IR 5 milliGRAM(s) Oral every 4 hours PRN Moderate Pain (4 - 6)  oxyCODONE    IR 10 milliGRAM(s) Oral every 4 hours PRN Severe Pain (7 - 10)      CAPILLARY BLOOD GLUCOSE        I&O's Summary    09 Sep 2022 07:01  -  10 Sep 2022 07:00  --------------------------------------------------------  IN: 0 mL / OUT: 550 mL / NET: -550 mL    10 Sep 2022 07:01  -  10 Sep 2022 11:51  --------------------------------------------------------  IN: 120 mL / OUT: 0 mL / NET: 120 mL        24hrs Vital:  T(C): 36.7 (09-10-22 @ 08:57), Max: 37.1 (09-09-22 @ 18:35)  HR: 91 (09-10-22 @ 08:57) (78 - 99)  BP: 124/83 (09-10-22 @ 08:57) (109/58 - 144/79)  RR: 18 (09-10-22 @ 08:57) (16 - 18)  SpO2: 98% (09-10-22 @ 08:57) (96% - 99%)    PHYSICAL EXAM:  GENERAL: NAD, well-developed  HEAD:  Atraumatic, Normocephalic  EYES: EOMI, PERRLA, conjunctiva and sclera clear  NECK: Supple, No JVD  CHEST/LUNG: Clear to auscultation bilaterally; No wheeze  HEART: S1S2; No rubs, or gallops, no murmurs  ABDOMEN: Soft, Nontender; Bowel sounds present  EXTREMITIES:  + Peripheral Pulses, No clubbing or cyanosis, no edema  PSYCH: AO x 3,   NEUROLOGY: Alert, no focal motor deficits  hypoesthesia both feet  paresthesia bilateral lower calves  SKIN: No rashes or lesions    LABS:                        13.7   11.70 )-----------( 302      ( 09 Sep 2022 12:54 )             41.6     09-09    136  |  100  |  13  ----------------------------<  96  3.9   |  26  |  0.80    Ca    9.2      09 Sep 2022 12:54    TPro  7.2  /  Alb  4.2  /  TBili  0.3  /  DBili  x   /  AST  16  /  ALT  15  /  AlkPhos  60  09-09    PT/INR - ( 09 Sep 2022 12:54 )   PT: 13.0 sec;   INR: 1.12 ratio         PTT - ( 09 Sep 2022 12:54 )  PTT:28.0 sec          RADIOLOGY & ADDITIONAL TESTS:    Consultant(s) Notes Reviewed:      Care Discussed with Consultants/Other Providers:

## 2022-09-10 NOTE — PROVIDER CONTACT NOTE (OTHER) - RECOMMENDATIONS
RN explained to pt the need to straight cath, patient refused stating she wants to try the purewick first. RN explained dangers of retaining that much urine, pt still wants to try to go on their own.
Oxy 10?

## 2022-09-10 NOTE — CHART NOTE - NSCHARTNOTEFT_GEN_A_CORE
CAPRINI SCORE [CLOT] Score on Admission for     AGE RELATED RISK FACTORS                                                       MOBILITY RELATED FACTORS  [ ] Age 41-60 years                                            (1 Point)                  [ ] Bed rest                                                        (1 Point)  [ ] Age: 61-74 years                                           (2 Points)                 [ ] Plaster cast                                                   (2 Points)  [ ] Age= 75 years                                              (3 Points)                 [ ] Bed bound for more than 72 hours                 (2 Points)    DISEASE RELATED RISK FACTORS                                               GENDER SPECIFIC FACTORS  [ ] Edema in the lower extremities                       (1 Point)                  [ ] Pregnancy                                                     (1 Point)  [ ] Varicose veins                                               (1 Point)                  [ ] Post-partum < 6 weeks                                   (1 Point)             [x ] BMI > 25 Kg/m2                                            (1 Point)                  [ ] Hormonal therapy  or oral contraception          (1 Point)                 [ ] Sepsis (in the previous month)                        (1 Point)                  [ ] History of pregnancy complications                 (1 point)  [ ] Pneumonia or serious lung disease                                               [ ] Unexplained or recurrent                     (1 Point)           (in the previous month)                               (1 Point)  [ ] Abnormal pulmonary function test                     (1 Point)                 SURGERY RELATED RISK FACTORS (include planned surgeries)  [ ] Acute myocardial infarction                              (1 Point)                 [ ]  Section                                             (1 Point)  [ ] Congestive heart failure (in the previous month)  (1 Point)         [ ] Minor surgery                                                  (1 Point)   [ ] Inflammatory bowel disease                             (1 Point)                 [ ] Arthroscopic surgery                                        (2 Points)  [ ] Central venous access                                      (2 Points)                [ x] General surgery lasting more than 45 minutes   (2 Points)       [ ] Stroke (in the previous month)                          (5 Points)               [ ] Elective arthroplasty                                         (5 Points)            [ ] current or past malignancy                              (2 Points)                                                                                                       HEMATOLOGY RELATED FACTORS                                                 TRAUMA RELATED RISK FACTORS  [ ] Prior episodes of VTE                                     (3 Points)                [ ] Fracture of the hip, pelvis, or leg                       (5 Points)  [ ] Positive family history for VTE                         (3 Points)                 [ ] Acute spinal cord injury (in the previous month)  (5 Points)  [ ] Prothrombin 55781 A                                     (3 Points)                 [ ] Paralysis  (less than 1 month)                             (5 Points)  [ ] Factor V Leiden                                             (3 Points)                  [ ] Multiple Trauma within 1 month                        (5 Points)  [ ] Lupus anticoagulants                                     (3 Points)                                                           [ ] Anticardiolipin antibodies                               (3 Points)                                                       [ ] High homocysteine in the blood                      (3 Points)                                             [ ] Other congenital or acquired thrombophilia      (3 Points)                                                [ ] Heparin induced thrombocytopenia                  (3 Points)                                          Total Score [    3      ]    Risk:  Very low 0   Low 1 to 2   Moderate 3 to 4   High =5       VTE Prophylasix Recommednations:  [x ] mechanical pneumatic compression devices                                      [ ] contraindicated: _____________________  [ x] chemo prophylasix                                                                                   [ ] contraindicated _____________________    **** HIGH LIKELIHOOD DVT PRESENT ON ADMISSION  [ ] (please order LE dopplers within 24 hours of admission)

## 2022-09-10 NOTE — PROVIDER CONTACT NOTE (OTHER) - ASSESSMENT
Pt A&Ox4. VSS. Pt sitting upright in chair. Pt reporting severe burning pain and numbness radiating down both legs. Pt requesting a muscle relaxer.
pt is A&OX4, states she feels numbness and tingling in legs, pt states she has not peed since 11AM and feels no sensation to pee, pt is able to wiggle toes and move legs.

## 2022-09-10 NOTE — PROVIDER CONTACT NOTE (OTHER) - BACKGROUND
NO AUTH REQUIRED FOR LLE U/S SCLEROTHERAPY, MEDICARE IS PRIMARY [facility] REF# [id number]        Signatures   Electronically signed by : Tiffany Collins R.N.; May 24 2017  1:44PM CST    
pt with disc herniation at L5-S1 with central and L foraminal stenosis
pt dx with herniation of lumbar spine.

## 2022-09-10 NOTE — CONSULT NOTE ADULT - PROBLEM SELECTOR RECOMMENDATION 4
likely secondary to lumbar radiculopathy  patient unable to tolerate full MRI  defer to Neurosurgery

## 2022-09-10 NOTE — PROGRESS NOTE ADULT - SUBJECTIVE AND OBJECTIVE BOX
SUBJECTIVE: Patient seen and examined at bedside with  present. Anxious about upcoming surgery.     OVERNIGHT EVENTS: none     Vital Signs Last 24 Hrs  T(C): 36.7 (10 Sep 2022 08:57), Max: 37.1 (09 Sep 2022 18:35)  T(F): 98 (10 Sep 2022 08:57), Max: 98.8 (09 Sep 2022 18:35)  HR: 91 (10 Sep 2022 08:57) (78 - 99)  BP: 124/83 (10 Sep 2022 08:57) (109/58 - 144/79)  BP(mean): --  RR: 18 (10 Sep 2022 08:57) (16 - 18)  SpO2: 98% (10 Sep 2022 08:57) (96% - 99%)    Parameters below as of 10 Sep 2022 08:57  Patient On (Oxygen Delivery Method): room air    PHYSICAL EXAM:    General: No Acute Distress     Neurological: Awake, alert oriented to person, place and time, Following Commands, BUE 5/5, RLE 5/5, L HF and KE 4/5 (pain limited), L PF/DF 5/5. Decreased sensation along L calf, plantar surface of L foot, and R hip    Pulmonary: Clear to Auscultation, No Rales, No Rhonchi, No Wheezes     Cardiovascular: S1, S2, Regular Rate and Rhythm     Gastrointestinal: Soft, Nontender, Nondistended       LABS:                        13.7   11.70 )-----------( 302      ( 09 Sep 2022 12:54 )             41.6    09-09    136  |  100  |  13  ----------------------------<  96  3.9   |  26  |  0.80    Ca    9.2      09 Sep 2022 12:54    TPro  7.2  /  Alb  4.2  /  TBili  0.3  /  DBili  x   /  AST  16  /  ALT  15  /  AlkPhos  60  09-09  PT/INR - ( 09 Sep 2022 12:54 )   PT: 13.0 sec;   INR: 1.12 ratio         PTT - ( 09 Sep 2022 12:54 )  PTT:28.0 sec      09-09 @ 07:01  -  09-10 @ 07:00  --------------------------------------------------------  IN: 0 mL / OUT: 550 mL / NET: -550 mL    09-10 @ 07:01  -  09-10 @ 10:57  --------------------------------------------------------  IN: 120 mL / OUT: 0 mL / NET: 120 mL        MEDICATIONS:  Antibiotics:    Neuro:  acetaminophen     Tablet .. 650 milliGRAM(s) Oral every 6 hours PRN Temp greater or equal to 38C (100.4F), Mild Pain (1 - 3)  cyclobenzaprine 10 milliGRAM(s) Oral three times a day PRN Muscle Spasm  ondansetron Injectable 4 milliGRAM(s) IV Push every 6 hours PRN Nausea and/or Vomiting  oxyCODONE    IR 5 milliGRAM(s) Oral every 4 hours PRN Moderate Pain (4 - 6)  oxyCODONE    IR 10 milliGRAM(s) Oral every 4 hours PRN Severe Pain (7 - 10)    Cardiac:    Pulm:    GI/:  bisacodyl 5 milliGRAM(s) Oral every 12 hours PRN Constipation  polyethylene glycol 3350 17 Gram(s) Oral daily  senna 2 Tablet(s) Oral at bedtime    Other:   dexAMETHasone  Injectable 2 milliGRAM(s) IV Push three times a day  enoxaparin Injectable 30 milliGRAM(s) SubCutaneous every 12 hours    DIET: regular diet     IMAGING: LED pending

## 2022-09-10 NOTE — PROVIDER CONTACT NOTE (OTHER) - ACTION/TREATMENT ORDERED:
Oxy 10 given as per orders. As per MD fenton to given one time does of morphine 1 hr after oxy. Will continue to monitor pain. No further interventions at this time.
ARNALDO Tatum made aware, ACP to put in order for straight cath  for RN to preform if pt does not go on her own.

## 2022-09-11 LAB
MRSA PCR RESULT.: SIGNIFICANT CHANGE UP
S AUREUS DNA NOSE QL NAA+PROBE: DETECTED

## 2022-09-11 PROCEDURE — 72148 MRI LUMBAR SPINE W/O DYE: CPT | Mod: 26

## 2022-09-11 PROCEDURE — 93970 EXTREMITY STUDY: CPT | Mod: 26

## 2022-09-11 RX ORDER — DIAZEPAM 5 MG
5 TABLET ORAL ONCE
Refills: 0 | Status: DISCONTINUED | OUTPATIENT
Start: 2022-09-11 | End: 2022-09-11

## 2022-09-11 RX ORDER — GABAPENTIN 400 MG/1
300 CAPSULE ORAL THREE TIMES A DAY
Refills: 0 | Status: DISCONTINUED | OUTPATIENT
Start: 2022-09-11 | End: 2022-09-13

## 2022-09-11 RX ORDER — LANOLIN ALCOHOL/MO/W.PET/CERES
5 CREAM (GRAM) TOPICAL AT BEDTIME
Refills: 0 | Status: DISCONTINUED | OUTPATIENT
Start: 2022-09-11 | End: 2022-09-13

## 2022-09-11 RX ORDER — MAGNESIUM HYDROXIDE 400 MG/1
30 TABLET, CHEWABLE ORAL DAILY
Refills: 0 | Status: DISCONTINUED | OUTPATIENT
Start: 2022-09-11 | End: 2022-09-13

## 2022-09-11 RX ADMIN — Medication 4 MILLIGRAM(S): at 17:35

## 2022-09-11 RX ADMIN — SENNA PLUS 2 TABLET(S): 8.6 TABLET ORAL at 22:26

## 2022-09-11 RX ADMIN — OXYCODONE HYDROCHLORIDE 10 MILLIGRAM(S): 5 TABLET ORAL at 16:19

## 2022-09-11 RX ADMIN — LINACLOTIDE 145 MICROGRAM(S): 145 CAPSULE, GELATIN COATED ORAL at 07:20

## 2022-09-11 RX ADMIN — OXYCODONE HYDROCHLORIDE 10 MILLIGRAM(S): 5 TABLET ORAL at 07:08

## 2022-09-11 RX ADMIN — GABAPENTIN 300 MILLIGRAM(S): 400 CAPSULE ORAL at 16:20

## 2022-09-11 RX ADMIN — GABAPENTIN 300 MILLIGRAM(S): 400 CAPSULE ORAL at 22:25

## 2022-09-11 RX ADMIN — Medication 4 MILLIGRAM(S): at 23:05

## 2022-09-11 RX ADMIN — ENOXAPARIN SODIUM 30 MILLIGRAM(S): 100 INJECTION SUBCUTANEOUS at 06:38

## 2022-09-11 RX ADMIN — OXYCODONE HYDROCHLORIDE 10 MILLIGRAM(S): 5 TABLET ORAL at 22:25

## 2022-09-11 RX ADMIN — OXYCODONE HYDROCHLORIDE 10 MILLIGRAM(S): 5 TABLET ORAL at 11:35

## 2022-09-11 RX ADMIN — Medication 5 MILLIGRAM(S): at 06:42

## 2022-09-11 RX ADMIN — MAGNESIUM HYDROXIDE 30 MILLILITER(S): 400 TABLET, CHEWABLE ORAL at 16:20

## 2022-09-11 RX ADMIN — OXYCODONE HYDROCHLORIDE 10 MILLIGRAM(S): 5 TABLET ORAL at 17:00

## 2022-09-11 RX ADMIN — METHOCARBAMOL 500 MILLIGRAM(S): 500 TABLET, FILM COATED ORAL at 23:04

## 2022-09-11 RX ADMIN — Medication 5 MILLIGRAM(S): at 00:13

## 2022-09-11 RX ADMIN — POLYETHYLENE GLYCOL 3350 17 GRAM(S): 17 POWDER, FOR SOLUTION ORAL at 11:04

## 2022-09-11 RX ADMIN — OXYCODONE HYDROCHLORIDE 10 MILLIGRAM(S): 5 TABLET ORAL at 22:55

## 2022-09-11 RX ADMIN — Medication 4 MILLIGRAM(S): at 06:41

## 2022-09-11 RX ADMIN — Medication 10 MILLIGRAM(S): at 16:20

## 2022-09-11 RX ADMIN — Medication 5 MILLIGRAM(S): at 12:08

## 2022-09-11 RX ADMIN — OXYCODONE HYDROCHLORIDE 10 MILLIGRAM(S): 5 TABLET ORAL at 06:38

## 2022-09-11 RX ADMIN — Medication 5 MILLIGRAM(S): at 22:26

## 2022-09-11 RX ADMIN — ENOXAPARIN SODIUM 30 MILLIGRAM(S): 100 INJECTION SUBCUTANEOUS at 17:35

## 2022-09-11 RX ADMIN — Medication 4 MILLIGRAM(S): at 11:05

## 2022-09-11 RX ADMIN — Medication 4 MILLIGRAM(S): at 00:20

## 2022-09-11 RX ADMIN — OXYCODONE HYDROCHLORIDE 10 MILLIGRAM(S): 5 TABLET ORAL at 11:04

## 2022-09-11 NOTE — PROGRESS NOTE ADULT - SUBJECTIVE AND OBJECTIVE BOX
Patient seen and examined at bedside.    --Anticoagulation--  enoxaparin Injectable 30 milliGRAM(s) SubCutaneous every 12 hours    T(C): 36.4 (09-11-22 @ 08:27), Max: 36.9 (09-10-22 @ 16:08)  HR: 67 (09-11-22 @ 08:27) (67 - 102)  BP: 108/69 (09-11-22 @ 08:27) (100/63 - 143/74)  RR: 18 (09-11-22 @ 08:27) (18 - 18)  SpO2: 97% (09-11-22 @ 08:27) (96% - 97%)  Wt(kg): --    Exam:  AO3, PERRL, EOMI, BUE 5/5, RLE 5/5, L HF and KE 4/5 (pain limited), L PF/DF 5/5. Decreased sensation along L calf, plantar surface of L foot, and R hip. No clonus/coelho, good rectal tone, no midline tenderness.    Labs:                        13.7   11.70 )-----------( 302      ( 09 Sep 2022 12:54 )             41.6     09-09    136  |  100  |  13  ----------------------------<  96  3.9   |  26  |  0.80    Ca    9.2      09 Sep 2022 12:54    TPro  7.2  /  Alb  4.2  /  TBili  0.3  /  DBili  x   /  AST  16  /  ALT  15  /  AlkPhos  60  09-09

## 2022-09-11 NOTE — PROGRESS NOTE ADULT - SUBJECTIVE AND OBJECTIVE BOX
Patient is a 37y old  Female who presents with a chief complaint of low back pain (10 Sep 2022 11:50)      DATE OF SERVICE: 09-11-22 @ 08:57    SUBJECTIVE / OVERNIGHT EVENTS: overnight events noted    ROS:  Resp: No cough no sputum production  CVS: No chest pain no palpitations no orthopnea  GI: no N/V/D  : no dysuria, no hematuria  Neuro: no weakness no paresthesias  Heme: No petechiae no easy bruising  Msk: continued LBP   Skin: No rash no itching        MEDICATIONS  (STANDING):  dexAMETHasone  Injectable 4 milliGRAM(s) IV Push every 6 hours  enoxaparin Injectable 30 milliGRAM(s) SubCutaneous every 12 hours  gabapentin 300 milliGRAM(s) Oral three times a day  linaclotide 145 MICROGram(s) Oral before breakfast  polyethylene glycol 3350 17 Gram(s) Oral daily  senna 2 Tablet(s) Oral at bedtime    MEDICATIONS  (PRN):  acetaminophen     Tablet .. 650 milliGRAM(s) Oral every 6 hours PRN Temp greater or equal to 38C (100.4F), Mild Pain (1 - 3)  bisacodyl 5 milliGRAM(s) Oral every 12 hours PRN Constipation  cyclobenzaprine 10 milliGRAM(s) Oral three times a day PRN Muscle Spasm  diazepam    Tablet 5 milliGRAM(s) Oral once PRN GIVE PRIOR TO MRI  melatonin 5 milliGRAM(s) Oral at bedtime PRN Sleep  methocarbamol 500 milliGRAM(s) Oral three times a day PRN Muscle Spasm  ondansetron Injectable 4 milliGRAM(s) IV Push every 6 hours PRN Nausea and/or Vomiting  oxyCODONE    IR 5 milliGRAM(s) Oral every 4 hours PRN Moderate Pain (4 - 6)  oxyCODONE    IR 10 milliGRAM(s) Oral every 4 hours PRN Severe Pain (7 - 10)        CAPILLARY BLOOD GLUCOSE        I&O's Summary    10 Sep 2022 07:01  -  11 Sep 2022 07:00  --------------------------------------------------------  IN: 420 mL / OUT: 1700 mL / NET: -1280 mL        Vital Signs Last 24 Hrs  T(C): 36.4 (11 Sep 2022 08:27), Max: 36.9 (10 Sep 2022 16:08)  T(F): 97.6 (11 Sep 2022 08:27), Max: 98.5 (10 Sep 2022 16:08)  HR: 67 (11 Sep 2022 08:27) (67 - 102)  BP: 108/69 (11 Sep 2022 08:27) (100/63 - 143/74)  BP(mean): --  RR: 18 (11 Sep 2022 08:27) (18 - 18)  SpO2: 97% (11 Sep 2022 08:27) (96% - 97%)    PHYSICAL EXAM:  GENERAL: NAD  EYES: EOMI, PERRLA,   NECK: Supple, No JVD  CHEST/LUNG: Clear   HEART: S1S2; no murmurs  ABDOMEN: Soft, Nontender  EXTREMITIES:  no edema  PSYCH: AO x 3,   NEUROLOGY: Alert, no focal motor deficits  no change  SKIN: No rashes or lesions    LABS:                        13.7   11.70 )-----------( 302      ( 09 Sep 2022 12:54 )             41.6     09-09    136  |  100  |  13  ----------------------------<  96  3.9   |  26  |  0.80    Ca    9.2      09 Sep 2022 12:54    TPro  7.2  /  Alb  4.2  /  TBili  0.3  /  DBili  x   /  AST  16  /  ALT  15  /  AlkPhos  60  09-09    PT/INR - ( 09 Sep 2022 12:54 )   PT: 13.0 sec;   INR: 1.12 ratio         PTT - ( 09 Sep 2022 12:54 )  PTT:28.0 sec            All consultant(s) notes reviewed and care discussed with other providers        Contact Number, Dr Tong [phone]

## 2022-09-12 ENCOUNTER — TRANSCRIPTION ENCOUNTER (OUTPATIENT)
Age: 37
End: 2022-09-12

## 2022-09-12 ENCOUNTER — APPOINTMENT (OUTPATIENT)
Dept: SPINE | Facility: CLINIC | Age: 37
End: 2022-09-12

## 2022-09-12 LAB
ANION GAP SERPL CALC-SCNC: 11 MMOL/L — SIGNIFICANT CHANGE UP (ref 5–17)
APTT BLD: 28.3 SEC — SIGNIFICANT CHANGE UP (ref 27.5–35.5)
BLD GP AB SCN SERPL QL: NEGATIVE — SIGNIFICANT CHANGE UP
BUN SERPL-MCNC: 14 MG/DL — SIGNIFICANT CHANGE UP (ref 7–23)
CALCIUM SERPL-MCNC: 9.2 MG/DL — SIGNIFICANT CHANGE UP (ref 8.4–10.5)
CHLORIDE SERPL-SCNC: 100 MMOL/L — SIGNIFICANT CHANGE UP (ref 96–108)
CO2 SERPL-SCNC: 26 MMOL/L — SIGNIFICANT CHANGE UP (ref 22–31)
CREAT SERPL-MCNC: 0.65 MG/DL — SIGNIFICANT CHANGE UP (ref 0.5–1.3)
EGFR: 118 ML/MIN/1.73M2 — SIGNIFICANT CHANGE UP
GLUCOSE SERPL-MCNC: 154 MG/DL — HIGH (ref 70–99)
HCG SERPL-ACNC: <2 MIU/ML — SIGNIFICANT CHANGE UP
HCT VFR BLD CALC: 38.7 % — SIGNIFICANT CHANGE UP (ref 34.5–45)
HGB BLD-MCNC: 12.9 G/DL — SIGNIFICANT CHANGE UP (ref 11.5–15.5)
INR BLD: 1.11 RATIO — SIGNIFICANT CHANGE UP (ref 0.88–1.16)
MCHC RBC-ENTMCNC: 29 PG — SIGNIFICANT CHANGE UP (ref 27–34)
MCHC RBC-ENTMCNC: 33.3 GM/DL — SIGNIFICANT CHANGE UP (ref 32–36)
MCV RBC AUTO: 87 FL — SIGNIFICANT CHANGE UP (ref 80–100)
NRBC # BLD: 0 /100 WBCS — SIGNIFICANT CHANGE UP (ref 0–0)
PLATELET # BLD AUTO: 337 K/UL — SIGNIFICANT CHANGE UP (ref 150–400)
POTASSIUM SERPL-MCNC: 4.1 MMOL/L — SIGNIFICANT CHANGE UP (ref 3.5–5.3)
POTASSIUM SERPL-SCNC: 4.1 MMOL/L — SIGNIFICANT CHANGE UP (ref 3.5–5.3)
PROTHROM AB SERPL-ACNC: 12.8 SEC — SIGNIFICANT CHANGE UP (ref 10.5–13.4)
RBC # BLD: 4.45 M/UL — SIGNIFICANT CHANGE UP (ref 3.8–5.2)
RBC # FLD: 12.5 % — SIGNIFICANT CHANGE UP (ref 10.3–14.5)
RH IG SCN BLD-IMP: POSITIVE — SIGNIFICANT CHANGE UP
SODIUM SERPL-SCNC: 137 MMOL/L — SIGNIFICANT CHANGE UP (ref 135–145)
WBC # BLD: 16.53 K/UL — HIGH (ref 3.8–10.5)
WBC # FLD AUTO: 16.53 K/UL — HIGH (ref 3.8–10.5)

## 2022-09-12 PROCEDURE — 99232 SBSQ HOSP IP/OBS MODERATE 35: CPT

## 2022-09-12 PROCEDURE — 99232 SBSQ HOSP IP/OBS MODERATE 35: CPT | Mod: 57

## 2022-09-12 PROCEDURE — 74018 RADEX ABDOMEN 1 VIEW: CPT | Mod: 26

## 2022-09-12 RX ORDER — SODIUM CHLORIDE 9 MG/ML
1000 INJECTION, SOLUTION INTRAVENOUS
Refills: 0 | Status: DISCONTINUED | OUTPATIENT
Start: 2022-09-12 | End: 2022-09-13

## 2022-09-12 RX ORDER — DEXAMETHASONE 0.5 MG/5ML
1 ELIXIR ORAL
Qty: 8 | Refills: 0
Start: 2022-09-12

## 2022-09-12 RX ORDER — SOD SULF/SODIUM/NAHCO3/KCL/PEG
1000 SOLUTION, RECONSTITUTED, ORAL ORAL ONCE
Refills: 0 | Status: DISCONTINUED | OUTPATIENT
Start: 2022-09-12 | End: 2022-09-13

## 2022-09-12 RX ORDER — ALPRAZOLAM 0.25 MG
0.25 TABLET ORAL ONCE
Refills: 0 | Status: DISCONTINUED | OUTPATIENT
Start: 2022-09-12 | End: 2022-09-12

## 2022-09-12 RX ORDER — ACETAMINOPHEN 500 MG
1000 TABLET ORAL ONCE
Refills: 0 | Status: DISCONTINUED | OUTPATIENT
Start: 2022-09-12 | End: 2022-09-13

## 2022-09-12 RX ORDER — MUPIROCIN 20 MG/G
1 OINTMENT TOPICAL
Refills: 0 | Status: DISCONTINUED | OUTPATIENT
Start: 2022-09-12 | End: 2022-09-13

## 2022-09-12 RX ORDER — HYDROCORTISONE 1 %
1 OINTMENT (GRAM) TOPICAL
Refills: 0 | Status: DISCONTINUED | OUTPATIENT
Start: 2022-09-12 | End: 2022-09-13

## 2022-09-12 RX ORDER — DIAZEPAM 5 MG
2 TABLET ORAL ONCE
Refills: 0 | Status: DISCONTINUED | OUTPATIENT
Start: 2022-09-12 | End: 2022-09-12

## 2022-09-12 RX ADMIN — ENOXAPARIN SODIUM 30 MILLIGRAM(S): 100 INJECTION SUBCUTANEOUS at 05:17

## 2022-09-12 RX ADMIN — Medication 4 MILLIGRAM(S): at 17:13

## 2022-09-12 RX ADMIN — CYCLOBENZAPRINE HYDROCHLORIDE 10 MILLIGRAM(S): 10 TABLET, FILM COATED ORAL at 17:12

## 2022-09-12 RX ADMIN — GABAPENTIN 300 MILLIGRAM(S): 400 CAPSULE ORAL at 13:29

## 2022-09-12 RX ADMIN — GABAPENTIN 300 MILLIGRAM(S): 400 CAPSULE ORAL at 21:27

## 2022-09-12 RX ADMIN — Medication 10 MILLIGRAM(S): at 09:07

## 2022-09-12 RX ADMIN — LINACLOTIDE 145 MICROGRAM(S): 145 CAPSULE, GELATIN COATED ORAL at 05:22

## 2022-09-12 RX ADMIN — OXYCODONE HYDROCHLORIDE 10 MILLIGRAM(S): 5 TABLET ORAL at 16:23

## 2022-09-12 RX ADMIN — Medication 650 MILLIGRAM(S): at 16:23

## 2022-09-12 RX ADMIN — OXYCODONE HYDROCHLORIDE 10 MILLIGRAM(S): 5 TABLET ORAL at 23:32

## 2022-09-12 RX ADMIN — GABAPENTIN 300 MILLIGRAM(S): 400 CAPSULE ORAL at 05:15

## 2022-09-12 RX ADMIN — POLYETHYLENE GLYCOL 3350 17 GRAM(S): 17 POWDER, FOR SOLUTION ORAL at 11:44

## 2022-09-12 RX ADMIN — Medication 4 MILLIGRAM(S): at 05:15

## 2022-09-12 RX ADMIN — Medication 4 MILLIGRAM(S): at 23:29

## 2022-09-12 RX ADMIN — Medication 4 MILLIGRAM(S): at 11:44

## 2022-09-12 RX ADMIN — Medication 0.25 MILLIGRAM(S): at 11:43

## 2022-09-12 RX ADMIN — Medication 2 MILLIGRAM(S): at 21:27

## 2022-09-12 RX ADMIN — Medication 1 ENEMA: at 10:42

## 2022-09-12 RX ADMIN — CYCLOBENZAPRINE HYDROCHLORIDE 10 MILLIGRAM(S): 10 TABLET, FILM COATED ORAL at 03:38

## 2022-09-12 NOTE — CONSULT NOTE ADULT - SUBJECTIVE AND OBJECTIVE BOX
Patient is a 37y old  Female who presents with a chief complaint of low back pain (12 Sep 2022 12:57)      HPI:  37F patient of Dr. Patricio, p/w 2 days of worsening severe LBP radiating down posterior LLE w/numbness/paresthesia. No bowel/urine incontinence, no saddle anesthesia. MRI from 7/15 shows a disc herniation at L5-S1 with central and L foraminal stenosis. Patient has significant LBP with any movement. 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) along with PRN suppositories, enema, Mag Citrate and Smooth Moves tea   no BM since prior to admission  no rectal bleeding  +hemorrhoids with associated swelling and discomfort  follows as outpt with Dr LJ Duran  No previous colonoscopy  no personal or FH of IND or GI malignancy    s/p Dulcolax supp yesterday and enema x 2 today : +BM form pieces of brown stool following tap water soap suds enema  no abdominal pain, nausea or vomiting  no rectal bleeding or melena    concerned about constipation pre-op/vasquez-op  for which GI is consulted  no fecal or urinary incontinence    PAST MEDICAL & SURGICAL HISTORY:  Lumbar herniated disc  Constipation  Hemorrhoids    No significant past surgical history        Allergies  No Known Allergies      MEDICATIONS  (STANDING):  acetaminophen   IVPB .. 1000 milliGRAM(s) IV Intermittent once  dexAMETHasone  Injectable 4 milliGRAM(s) IV Push every 6 hours  gabapentin 300 milliGRAM(s) Oral three times a day  lactated ringers. 1000 milliLiter(s) (75 mL/Hr) IV Continuous <Continuous>  linaclotide 145 MICROGram(s) Oral before breakfast  mupirocin 2% Ointment 1 Application(s) Topical two times a day  polyethylene glycol 3350 17 Gram(s) Oral daily  senna 2 Tablet(s) Oral at bedtime    MEDICATIONS  (PRN):  acetaminophen     Tablet .. 650 milliGRAM(s) Oral every 6 hours PRN Temp greater or equal to 38C (100.4F), Mild Pain (1 - 3)  bisacodyl 10 milliGRAM(s) Oral every 12 hours PRN Constipation  bisacodyl Suppository 10 milliGRAM(s) Rectal daily PRN Constipation  cyclobenzaprine 10 milliGRAM(s) Oral three times a day PRN Muscle Spasm  magnesium hydroxide Suspension 30 milliLiter(s) Oral daily PRN Constipation  melatonin 5 milliGRAM(s) Oral at bedtime PRN Sleep  methocarbamol 500 milliGRAM(s) Oral three times a day PRN Muscle Spasm  ondansetron Injectable 4 milliGRAM(s) IV Push every 6 hours PRN Nausea and/or Vomiting  oxyCODONE    IR 10 milliGRAM(s) Oral every 4 hours PRN Severe Pain (7 - 10)  oxyCODONE    IR 5 milliGRAM(s) Oral every 4 hours PRN Moderate Pain (4 - 6)      Social History:    Special   no tobacco  lives with family  no ETOH      Family History   IBD (  ) Yes   (X  ) No  GI Malignancy (  )  Yes    ( X ) No    Health Management  Last Colonoscopy - none      Advanced Directives: (   X  ) None    (      ) DNR    (     ) DNI    (     ) Health Care Proxy:     Review of Systems:    General:  No wt loss, fevers, chills, night sweats, fatigue,   CV:  No pain, palpitatioins, hypo/hypertension  Resp:  No dyspnea, cough, tachypnea, wheezing  GI:  see HPI  :  No pain, bleeding, incontinence, nocturia  Muscle:  No pain, weakness  Neuro:  see HPI  Psych:  No fatigue, insomnia, mood problems, depression  Endocrine:  No polyuria, polydypsia, cold/heat intolerance  Heme:  No petechiae, ecchymosis, easy bruisability  Skin:  No rash, tattoos, scars, edema      Vital Signs Last 24 Hrs  T(C): 36.7 (12 Sep 2022 12:17), Max: 36.7 (11 Sep 2022 16:36)  T(F): 98 (12 Sep 2022 12:17), Max: 98 (11 Sep 2022 16:36)  HR: 91 (12 Sep 2022 12:17) (72 - 95)  BP: 126/78 (12 Sep 2022 12:17) (108/62 - 132/91)  BP(mean): --  RR: 18 (12 Sep 2022 12:17) (18 - 18)  SpO2: 97% (12 Sep 2022 12:17) (94% - 97%)    Parameters below as of 12 Sep 2022 12:17  Patient On (Oxygen Delivery Method): room air        PHYSICAL EXAM:    Constitutional: NAD, well-developed  Neck: No LAD, supple  Respiratory: CTA and P  Cardiovascular: S1 and S2, RRR, no M  Gastrointestinal: BS+, soft, obese, NT/ND, neg HSM  Rectal +multiple edematous hemorrhoids, non thrombosed, non bleeding. Pt would not allow SOLA 2/2 hemorrhoidal discomfort  Extremities: No peripheral edema, neg clubbing, cyanosis  Vascular: 2+ peripheral pulses  Neurological: A/O x 3, decreased sensation   Psychiatric: anxious, normal affect  Skin: No rashes        LABS:                        12.9   16.53 )-----------( 337      ( 12 Sep 2022 07:48 )             38.7     09-12    137  |  100  |  14  ----------------------------<  154<H>  4.1   |  26  |  0.65    Ca    9.2      12 Sep 2022 07:50      PT/INR - ( 12 Sep 2022 07:49 )   PT: 12.8 sec;   INR: 1.11 ratio      PTT - ( 12 Sep 2022 07:49 )  PTT:28.3 sec        RADIOLOGY & ADDITIONAL TESTS:    ACC: [id number] EXAM:  MR SPINE LUMBAR                          PROCEDURE DATE:  09/11/2022          INTERPRETATION:  CLINICAL INDICATION: Left-sided disc herniation L5-S1 on   prior incomplete study 9/9/2022 for completion    Magnetic resonance imaging of the lumbosacral spine was carried out with   sagittal T2 series, coronal T1 series and axial T1 and T2 series from   T10-11 through S3 on a 1.5 Christina magnet.    Comparison is made with the prior MRI of 9/9/2022.    The visualized thoracic and lumbosacral vertebral bodies are normal in   height and signal intensity. There is endplate degenerative changes at   L5-S1 and degenerative disc disease with loss of signal on the   T2-weighted images. There is a large central and left-sided disc   herniation at L5-S1 with significant thecal sac compression. Soft tissue   extending downward behind the S1 vertebral body on the left may represent   a sequestered fragment.      The conus medullaris is normal in appearance and terminates at the L1-2   level.    The paraspinal soft tissues are unremarkable.      IMPRESSION: Large central and left-sided disc herniation L5-S1 with   significant thecal sac compression and extension down behind the S1   vertebral body with a probable sequestered fragment.                 Patient is a 37y old  Female who presents with a chief complaint of low back pain (12 Sep 2022 12:57)      HPI:  37F patient of Dr. Patricio, p/w 2 days of worsening severe LBP radiating down posterior LLE w/numbness/paresthesia. No bowel/urine incontinence, no saddle anesthesia. MRI from 7/15 shows a disc herniation at L5-S1 with central and L foraminal stenosis. Patient has significant LBP with any movement. 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) along with PRN suppositories, enema, Mag Citrate and Smooth Moves tea   no BM since prior to admission  no rectal bleeding  +hemorrhoids with associated swelling and discomfort  follows as outpt with Dr LJ Duran  No previous colonoscopy  no personal or FH of IND or GI malignancy    s/p Dulcolax supp yesterday and enema x 2 today : +BM form pieces of brown stool following tap water soap suds enema  no abdominal pain, nausea or vomiting  no rectal bleeding or melena    concerned about constipation pre-op/vasquez-op  for which GI is consulted  no fecal or urinary incontinence    PAST MEDICAL & SURGICAL HISTORY:  Lumbar herniated disc  Constipation  Hemorrhoids    No significant past surgical history        Allergies  No Known Allergies      MEDICATIONS  (STANDING):  acetaminophen   IVPB .. 1000 milliGRAM(s) IV Intermittent once  dexAMETHasone  Injectable 4 milliGRAM(s) IV Push every 6 hours  gabapentin 300 milliGRAM(s) Oral three times a day  lactated ringers. 1000 milliLiter(s) (75 mL/Hr) IV Continuous <Continuous>  linaclotide 145 MICROGram(s) Oral before breakfast  mupirocin 2% Ointment 1 Application(s) Topical two times a day  polyethylene glycol 3350 17 Gram(s) Oral daily  senna 2 Tablet(s) Oral at bedtime    MEDICATIONS  (PRN):  acetaminophen     Tablet .. 650 milliGRAM(s) Oral every 6 hours PRN Temp greater or equal to 38C (100.4F), Mild Pain (1 - 3)  bisacodyl 10 milliGRAM(s) Oral every 12 hours PRN Constipation  bisacodyl Suppository 10 milliGRAM(s) Rectal daily PRN Constipation  cyclobenzaprine 10 milliGRAM(s) Oral three times a day PRN Muscle Spasm  magnesium hydroxide Suspension 30 milliLiter(s) Oral daily PRN Constipation  melatonin 5 milliGRAM(s) Oral at bedtime PRN Sleep  methocarbamol 500 milliGRAM(s) Oral three times a day PRN Muscle Spasm  ondansetron Injectable 4 milliGRAM(s) IV Push every 6 hours PRN Nausea and/or Vomiting  oxyCODONE    IR 10 milliGRAM(s) Oral every 4 hours PRN Severe Pain (7 - 10)  oxyCODONE    IR 5 milliGRAM(s) Oral every 4 hours PRN Moderate Pain (4 - 6)      Social History:    Special   no tobacco  lives with family  no ETOH      Family History   IBD (  ) Yes   (X  ) No  GI Malignancy (  )  Yes    ( X ) No    Health Management  Last Colonoscopy - none      Advanced Directives: (   X  ) None    (      ) DNR    (     ) DNI    (     ) Health Care Proxy:     Review of Systems:    General:  No wt loss, fevers, chills, night sweats, fatigue,   CV:  No pain, palpitatioins, hypo/hypertension  Resp:  No dyspnea, cough, tachypnea, wheezing  GI:  see HPI  :  No pain, bleeding, incontinence, nocturia  Muscle:  No pain, weakness  Neuro:  see HPI  Psych:  No fatigue, insomnia, mood problems, depression  Endocrine:  No polyuria, polydypsia, cold/heat intolerance  Heme:  No petechiae, ecchymosis, easy bruisability  Skin:  No rash, tattoos, scars, edema      Vital Signs Last 24 Hrs  T(C): 36.7 (12 Sep 2022 12:17), Max: 36.7 (11 Sep 2022 16:36)  T(F): 98 (12 Sep 2022 12:17), Max: 98 (11 Sep 2022 16:36)  HR: 91 (12 Sep 2022 12:17) (72 - 95)  BP: 126/78 (12 Sep 2022 12:17) (108/62 - 132/91)  BP(mean): --  RR: 18 (12 Sep 2022 12:17) (18 - 18)  SpO2: 97% (12 Sep 2022 12:17) (94% - 97%)    Parameters below as of 12 Sep 2022 12:17  Patient On (Oxygen Delivery Method): room air        PHYSICAL EXAM:    Constitutional: NAD, well-developed  Neck: No LAD, supple  Respiratory: CTA and P  Cardiovascular: S1 and S2, RRR, no M  Gastrointestinal: BS+, soft, obese, NT/ND, neg HSM  Rectal +multiple edematous hemorrhoids, non thrombosed, non bleeding. Pt would not allow SOLA 2/2 hemorrhoidal discomfort  Extremities: No peripheral edema, neg clubbing, cyanosis  Vascular: 2+ peripheral pulses  Neurological: A/O x 3, decreased sensation   Psychiatric: anxious, normal affect  Skin: No rashes        LABS:                        12.9   16.53 )-----------( 337      ( 12 Sep 2022 07:48 )             38.7     09-12    137  |  100  |  14  ----------------------------<  154<H>  4.1   |  26  |  0.65    Ca    9.2      12 Sep 2022 07:50      PT/INR - ( 12 Sep 2022 07:49 )   PT: 12.8 sec;   INR: 1.11 ratio      PTT - ( 12 Sep 2022 07:49 )  PTT:28.3 sec        RADIOLOGY & ADDITIONAL TESTS:    ACC: [id number] EXAM:  MR SPINE LUMBAR                          PROCEDURE DATE:  09/11/2022          INTERPRETATION:  CLINICAL INDICATION: Left-sided disc herniation L5-S1 on   prior incomplete study 9/9/2022 for completion    Magnetic resonance imaging of the lumbosacral spine was carried out with   sagittal T2 series, coronal T1 series and axial T1 and T2 series from   T10-11 through S3 on a 1.5 Christina magnet.    Comparison is made with the prior MRI of 9/9/2022.    The visualized thoracic and lumbosacral vertebral bodies are normal in   height and signal intensity. There is endplate degenerative changes at   L5-S1 and degenerative disc disease with loss of signal on the   T2-weighted images. There is a large central and left-sided disc   herniation at L5-S1 with significant thecal sac compression. Soft tissue   extending downward behind the S1 vertebral body on the left may represent   a sequestered fragment.      The conus medullaris is normal in appearance and terminates at the L1-2   level.    The paraspinal soft tissues are unremarkable.      IMPRESSION: Large central and left-sided disc herniation L5-S1 with   significant thecal sac compression and extension down behind the S1   vertebral body with a probable sequestered fragment.

## 2022-09-12 NOTE — PROGRESS NOTE ADULT - SUBJECTIVE AND OBJECTIVE BOX
HPI:  Patient is a 37 year old female with two days of worsening low back pain that radiated down the left lower extremity w/ associated numbness and paresthesias.  Found to have disc herniation at :5-S1.  Admitted for neurosurgical management.    OVERNIGHT EVENTS:  No acute events overnight.  Still having lower back pain.  Had mri done yesterday.  Planned for OR tomorrow.    Vital Signs Last 24 Hrs  T(C): 36.6 (12 Sep 2022 08:20), Max: 36.8 (11 Sep 2022 12:03)  T(F): 97.8 (12 Sep 2022 08:20), Max: 98.2 (11 Sep 2022 12:03)  HR: 90 (12 Sep 2022 08:20) (72 - 95)  BP: 132/91 (12 Sep 2022 08:20) (108/62 - 132/91)  BP(mean): --  RR: 18 (12 Sep 2022 08:20) (18 - 18)  SpO2: 97% (12 Sep 2022 08:20) (94% - 97%)    Parameters below as of 12 Sep 2022 08:20  Patient On (Oxygen Delivery Method): room air        I&O's Detail    11 Sep 2022 07:01  -  12 Sep 2022 07:00  --------------------------------------------------------  IN:    Oral Fluid: 300 mL  Total IN: 300 mL    OUT:    Voided (mL): 600 mL  Total OUT: 600 mL    Total NET: -300 mL        I&O's Summary    11 Sep 2022 07:01  -  12 Sep 2022 07:00  --------------------------------------------------------  IN: 300 mL / OUT: 600 mL / NET: -300 mL        PHYSICAL EXAM:  Neurological: awake, alert, oriented x3, follows commands, speech clear and fluent  RUE: 5/5 throughout  LUE: 5/5 throughout  RLE: 4+/5 throughout (pain limited)  LLE: 4+/5 throughout (pain limited)  decreased sensation from waist down left > right    Cardiovascular: +s1, s2  Respiratory: clear to auscultation b/l  Gastrointestinal: soft, non-distended, non-tender  Genitourinary: +voiding  Extremities: +dp/pt pulses palpable b/l    TUBES/LINES:  [x] none    DIET:  [x] regular      LABS:                        12.9   16.53 )-----------( 337      ( 12 Sep 2022 07:48 )             38.7     09-12    137  |  100  |  14  ----------------------------<  154<H>  4.1   |  26  |  0.65    Ca    9.2      12 Sep 2022 07:50      PT/INR - ( 12 Sep 2022 07:49 )   PT: 12.8 sec;   INR: 1.11 ratio         PTT - ( 12 Sep 2022 07:49 )  PTT:28.3 sec            Allergies    No Known Allergies          MEDICATIONS:  Antibiotics:  none    Neuro:  acetaminophen     Tablet .. 650 milliGRAM(s) Oral every 6 hours PRN  acetaminophen   IVPB .. 1000 milliGRAM(s) IV Intermittent once  cyclobenzaprine 10 milliGRAM(s) Oral three times a day PRN  gabapentin 300 milliGRAM(s) Oral three times a day  melatonin 5 milliGRAM(s) Oral at bedtime PRN  methocarbamol 500 milliGRAM(s) Oral three times a day PRN  ondansetron Injectable 4 milliGRAM(s) IV Push every 6 hours PRN  oxyCODONE    IR 10 milliGRAM(s) Oral every 4 hours PRN  oxyCODONE    IR 5 milliGRAM(s) Oral every 4 hours PRN    Anticoagulation:  enoxaparin Injectable 30 milliGRAM(s) SubCutaneous every 12 hours    OTHER:  bisacodyl 10 milliGRAM(s) Oral every 12 hours PRN  bisacodyl Suppository 10 milliGRAM(s) Rectal daily PRN  dexAMETHasone  Injectable 4 milliGRAM(s) IV Push every 6 hours  linaclotide 145 MICROGram(s) Oral before breakfast  magnesium hydroxide Suspension 30 milliLiter(s) Oral daily PRN  mupirocin 2% Ointment 1 Application(s) Topical two times a day  polyethylene glycol 3350 17 Gram(s) Oral daily  senna 2 Tablet(s) Oral at bedtime    IVF:  none    CULTURES:  none    RADIOLOGY & ADDITIONAL TESTS:  MRI: large left sided L5-S1 disc herniation with thecal sac compresison

## 2022-09-12 NOTE — PROGRESS NOTE ADULT - SUBJECTIVE AND OBJECTIVE BOX
Pt seen and examined. Sitting in chair.  Back and leg pain L>>> R  5/5 strength with some pain limited left PF  decreased sensation left S1    MRI shows central/lft disc hernation at L5/S1 with what appears to be fragment extending down behind body of S1    Pt with L5/S1 HNP and radiculopathy. Pt will benefit from L5/S1 laminectomy and discectomy.  I discussed the risks, benefits, indications and alternative with her and her family at bedside. She and they agree. Sched for surgery in am

## 2022-09-12 NOTE — CONSULT NOTE ADULT - ASSESSMENT
37F with PMH chronic LBP and L/S radiculopathy admitted with 2 days of worsening severe LBP radiating down posterior LLE w/numbness/paresthesia admitted for surgery     #Acute on Chronic constipation - improving, with aggressive bowel regimen  #Hemorrhoids (non bleeding non trombosed)    -Check AXR to evaluate fecal burden  -continue Miralax, Senna and Linzess  -Possible Moviprep x 1 liter if has large fecal burden on AXR  -Anusol HC supp OR (will order)  -check TSH r/o hypothyroid  -Discussed eventual outpt colonscopy once recovered from neurosurgery standpoint    No GI objection to planned OR     Discussed wiht pt and mother at bedside (with pt's consent)  Discussed with Neurosurgery team    Ryan Crawford PA-C    [facility]  [phone]  After hours and weekend coverage [phone]  
37F with PMH chronic LBP and L/S radiculopathy admitted with 2 days of worsening severe LBP radiating down posterior LLE w/numbness/paresthesia admitted for surgery

## 2022-09-12 NOTE — PROGRESS NOTE ADULT - SUBJECTIVE AND OBJECTIVE BOX
Patient is a 37y old  Female who presents with a chief complaint of low back pain (12 Sep 2022 08:48)      DATE OF SERVICE: 09-12-22 @ 12:57    SUBJECTIVE / OVERNIGHT EVENTS: overnight events noted    ROS:  Resp: No cough no sputum production  CVS: No chest pain no palpitations no orthopnea  GI: no N/V/D  : no dysuria, no hematuria  Neuro: states leg weakness worse with numbness  Heme: No petechiae no easy bruising  Msk: No joint pain no swelling  Skin: No rash no itching        MEDICATIONS  (STANDING):  acetaminophen   IVPB .. 1000 milliGRAM(s) IV Intermittent once  dexAMETHasone  Injectable 4 milliGRAM(s) IV Push every 6 hours  gabapentin 300 milliGRAM(s) Oral three times a day  lactated ringers. 1000 milliLiter(s) (75 mL/Hr) IV Continuous <Continuous>  linaclotide 145 MICROGram(s) Oral before breakfast  mupirocin 2% Ointment 1 Application(s) Topical two times a day  polyethylene glycol 3350 17 Gram(s) Oral daily  senna 2 Tablet(s) Oral at bedtime    MEDICATIONS  (PRN):  acetaminophen     Tablet .. 650 milliGRAM(s) Oral every 6 hours PRN Temp greater or equal to 38C (100.4F), Mild Pain (1 - 3)  bisacodyl 10 milliGRAM(s) Oral every 12 hours PRN Constipation  bisacodyl Suppository 10 milliGRAM(s) Rectal daily PRN Constipation  cyclobenzaprine 10 milliGRAM(s) Oral three times a day PRN Muscle Spasm  magnesium hydroxide Suspension 30 milliLiter(s) Oral daily PRN Constipation  melatonin 5 milliGRAM(s) Oral at bedtime PRN Sleep  methocarbamol 500 milliGRAM(s) Oral three times a day PRN Muscle Spasm  ondansetron Injectable 4 milliGRAM(s) IV Push every 6 hours PRN Nausea and/or Vomiting  oxyCODONE    IR 10 milliGRAM(s) Oral every 4 hours PRN Severe Pain (7 - 10)  oxyCODONE    IR 5 milliGRAM(s) Oral every 4 hours PRN Moderate Pain (4 - 6)        CAPILLARY BLOOD GLUCOSE        I&O's Summary    11 Sep 2022 07:01  -  12 Sep 2022 07:00  --------------------------------------------------------  IN: 300 mL / OUT: 600 mL / NET: -300 mL    12 Sep 2022 07:01  -  12 Sep 2022 12:57  --------------------------------------------------------  IN: 300 mL / OUT: 0 mL / NET: 300 mL        Vital Signs Last 24 Hrs  T(C): 36.7 (12 Sep 2022 12:17), Max: 36.7 (11 Sep 2022 16:36)  T(F): 98 (12 Sep 2022 12:17), Max: 98 (11 Sep 2022 16:36)  HR: 91 (12 Sep 2022 12:17) (72 - 95)  BP: 126/78 (12 Sep 2022 12:17) (108/62 - 132/91)  BP(mean): --  RR: 18 (12 Sep 2022 12:17) (18 - 18)  SpO2: 97% (12 Sep 2022 12:17) (94% - 97%)    PHYSICAL EXAM:  GENERAL: NAD  EYES: EOMI, PERRLA,   NECK: Supple, No JVD  CHEST/LUNG: Clear   HEART: S1S2; no murmurs  ABDOMEN: Soft, Nontender  EXTREMITIES:  no edema  NEUROLOGY: Alert ++ anxiety     LABS:                        12.9   16.53 )-----------( 337      ( 12 Sep 2022 07:48 )             38.7     09-12    137  |  100  |  14  ----------------------------<  154<H>  4.1   |  26  |  0.65    Ca    9.2      12 Sep 2022 07:50      PT/INR - ( 12 Sep 2022 07:49 )   PT: 12.8 sec;   INR: 1.11 ratio         PTT - ( 12 Sep 2022 07:49 )  PTT:28.3 sec            All consultant(s) notes reviewed and care discussed with other providers        Contact Number, Dr Tong [phone]

## 2022-09-13 ENCOUNTER — APPOINTMENT (OUTPATIENT)
Dept: SPINE | Facility: HOSPITAL | Age: 37
End: 2022-09-13

## 2022-09-13 ENCOUNTER — TRANSCRIPTION ENCOUNTER (OUTPATIENT)
Age: 37
End: 2022-09-13

## 2022-09-13 LAB — TSH SERPL-MCNC: 0.42 UIU/ML — SIGNIFICANT CHANGE UP (ref 0.27–4.2)

## 2022-09-13 PROCEDURE — 63030 LAMOT DCMPRN NRV RT 1 LMBR: CPT

## 2022-09-13 PROCEDURE — ZZZZZ: CPT

## 2022-09-13 DEVICE — SURGIFLO MATRIX WITH THROMBIN KIT: Type: IMPLANTABLE DEVICE | Status: FUNCTIONAL

## 2022-09-13 DEVICE — SURGIFOAM PAD 8CM X 12.5CM X 10MM (100): Type: IMPLANTABLE DEVICE | Status: FUNCTIONAL

## 2022-09-13 RX ORDER — POLYETHYLENE GLYCOL 3350 17 G/17G
17 POWDER, FOR SOLUTION ORAL DAILY
Refills: 0 | Status: DISCONTINUED | OUTPATIENT
Start: 2022-09-13 | End: 2022-09-15

## 2022-09-13 RX ORDER — ACETAMINOPHEN 500 MG
650 TABLET ORAL EVERY 6 HOURS
Refills: 0 | Status: DISCONTINUED | OUTPATIENT
Start: 2022-09-13 | End: 2022-09-13

## 2022-09-13 RX ORDER — HYDROMORPHONE HYDROCHLORIDE 2 MG/ML
0.5 INJECTION INTRAMUSCULAR; INTRAVENOUS; SUBCUTANEOUS
Refills: 0 | Status: DISCONTINUED | OUTPATIENT
Start: 2022-09-13 | End: 2022-09-13

## 2022-09-13 RX ORDER — SENNA PLUS 8.6 MG/1
2 TABLET ORAL AT BEDTIME
Refills: 0 | Status: DISCONTINUED | OUTPATIENT
Start: 2022-09-13 | End: 2022-09-15

## 2022-09-13 RX ORDER — HYDROMORPHONE HYDROCHLORIDE 2 MG/ML
0.5 INJECTION INTRAMUSCULAR; INTRAVENOUS; SUBCUTANEOUS EVERY 4 HOURS
Refills: 0 | Status: DISCONTINUED | OUTPATIENT
Start: 2022-09-13 | End: 2022-09-15

## 2022-09-13 RX ORDER — METHOCARBAMOL 500 MG/1
500 TABLET, FILM COATED ORAL THREE TIMES A DAY
Refills: 0 | Status: DISCONTINUED | OUTPATIENT
Start: 2022-09-13 | End: 2022-09-14

## 2022-09-13 RX ORDER — DEXAMETHASONE 0.5 MG/5ML
4 ELIXIR ORAL EVERY 6 HOURS
Refills: 0 | Status: DISCONTINUED | OUTPATIENT
Start: 2022-09-13 | End: 2022-09-14

## 2022-09-13 RX ORDER — SODIUM CHLORIDE 9 MG/ML
1000 INJECTION INTRAMUSCULAR; INTRAVENOUS; SUBCUTANEOUS
Refills: 0 | Status: DISCONTINUED | OUTPATIENT
Start: 2022-09-13 | End: 2022-09-14

## 2022-09-13 RX ORDER — ACETAMINOPHEN 500 MG
1000 TABLET ORAL ONCE
Refills: 0 | Status: COMPLETED | OUTPATIENT
Start: 2022-09-13 | End: 2022-09-14

## 2022-09-13 RX ORDER — ALPRAZOLAM 0.25 MG
0.5 TABLET ORAL ONCE
Refills: 0 | Status: DISCONTINUED | OUTPATIENT
Start: 2022-09-13 | End: 2022-09-13

## 2022-09-13 RX ORDER — OXYCODONE HYDROCHLORIDE 5 MG/1
10 TABLET ORAL EVERY 4 HOURS
Refills: 0 | Status: DISCONTINUED | OUTPATIENT
Start: 2022-09-13 | End: 2022-09-15

## 2022-09-13 RX ORDER — MAGNESIUM HYDROXIDE 400 MG/1
30 TABLET, CHEWABLE ORAL DAILY
Refills: 0 | Status: DISCONTINUED | OUTPATIENT
Start: 2022-09-13 | End: 2022-09-15

## 2022-09-13 RX ORDER — OXYCODONE HYDROCHLORIDE 5 MG/1
5 TABLET ORAL EVERY 4 HOURS
Refills: 0 | Status: DISCONTINUED | OUTPATIENT
Start: 2022-09-13 | End: 2022-09-15

## 2022-09-13 RX ORDER — LANOLIN ALCOHOL/MO/W.PET/CERES
5 CREAM (GRAM) TOPICAL AT BEDTIME
Refills: 0 | Status: DISCONTINUED | OUTPATIENT
Start: 2022-09-13 | End: 2022-09-15

## 2022-09-13 RX ORDER — ONDANSETRON 8 MG/1
4 TABLET, FILM COATED ORAL ONCE
Refills: 0 | Status: DISCONTINUED | OUTPATIENT
Start: 2022-09-13 | End: 2022-09-13

## 2022-09-13 RX ORDER — ENOXAPARIN SODIUM 100 MG/ML
40 INJECTION SUBCUTANEOUS EVERY 24 HOURS
Refills: 0 | Status: DISCONTINUED | OUTPATIENT
Start: 2022-09-14 | End: 2022-09-15

## 2022-09-13 RX ORDER — CEFAZOLIN SODIUM 1 G
2000 VIAL (EA) INJECTION EVERY 8 HOURS
Refills: 0 | Status: COMPLETED | OUTPATIENT
Start: 2022-09-13 | End: 2022-09-14

## 2022-09-13 RX ORDER — CYCLOBENZAPRINE HYDROCHLORIDE 10 MG/1
10 TABLET, FILM COATED ORAL THREE TIMES A DAY
Refills: 0 | Status: DISCONTINUED | OUTPATIENT
Start: 2022-09-13 | End: 2022-09-14

## 2022-09-13 RX ORDER — HYDROCORTISONE 1 %
1 OINTMENT (GRAM) TOPICAL
Refills: 0 | Status: DISCONTINUED | OUTPATIENT
Start: 2022-09-13 | End: 2022-09-15

## 2022-09-13 RX ADMIN — SENNA PLUS 2 TABLET(S): 8.6 TABLET ORAL at 20:34

## 2022-09-13 RX ADMIN — SODIUM CHLORIDE 70 MILLILITER(S): 9 INJECTION INTRAMUSCULAR; INTRAVENOUS; SUBCUTANEOUS at 16:42

## 2022-09-13 RX ADMIN — Medication 5 MILLIGRAM(S): at 23:46

## 2022-09-13 RX ADMIN — HYDROMORPHONE HYDROCHLORIDE 0.5 MILLIGRAM(S): 2 INJECTION INTRAMUSCULAR; INTRAVENOUS; SUBCUTANEOUS at 19:00

## 2022-09-13 RX ADMIN — Medication 0.5 MILLIGRAM(S): at 02:27

## 2022-09-13 RX ADMIN — HYDROMORPHONE HYDROCHLORIDE 0.5 MILLIGRAM(S): 2 INJECTION INTRAMUSCULAR; INTRAVENOUS; SUBCUTANEOUS at 18:40

## 2022-09-13 RX ADMIN — GABAPENTIN 300 MILLIGRAM(S): 400 CAPSULE ORAL at 06:06

## 2022-09-13 RX ADMIN — METHOCARBAMOL 500 MILLIGRAM(S): 500 TABLET, FILM COATED ORAL at 20:34

## 2022-09-13 RX ADMIN — HYDROMORPHONE HYDROCHLORIDE 0.5 MILLIGRAM(S): 2 INJECTION INTRAMUSCULAR; INTRAVENOUS; SUBCUTANEOUS at 13:35

## 2022-09-13 RX ADMIN — Medication 4 MILLIGRAM(S): at 06:06

## 2022-09-13 RX ADMIN — MUPIROCIN 1 APPLICATION(S): 20 OINTMENT TOPICAL at 06:35

## 2022-09-13 RX ADMIN — OXYCODONE HYDROCHLORIDE 10 MILLIGRAM(S): 5 TABLET ORAL at 20:33

## 2022-09-13 RX ADMIN — OXYCODONE HYDROCHLORIDE 10 MILLIGRAM(S): 5 TABLET ORAL at 21:29

## 2022-09-13 RX ADMIN — Medication 4 MILLIGRAM(S): at 23:46

## 2022-09-13 RX ADMIN — Medication 4 MILLIGRAM(S): at 17:57

## 2022-09-13 RX ADMIN — HYDROMORPHONE HYDROCHLORIDE 0.5 MILLIGRAM(S): 2 INJECTION INTRAMUSCULAR; INTRAVENOUS; SUBCUTANEOUS at 13:50

## 2022-09-13 RX ADMIN — Medication 100 MILLIGRAM(S): at 17:58

## 2022-09-13 RX ADMIN — OXYCODONE HYDROCHLORIDE 10 MILLIGRAM(S): 5 TABLET ORAL at 00:02

## 2022-09-13 NOTE — PROGRESS NOTE ADULT - SUBJECTIVE AND OBJECTIVE BOX
Patient seen and examined s/p L5-S1 laminectomy/discectomy.     AOx3, BUE 5/5, LLE 4 to 4+/5, RLE 4+/5, still numb in b/l feet and thighs, but slightly improved sensation in posterior legs, no clonus/Arriaga's    T(C): 36.5 (09-13-22 @ 17:51), Max: 36.6 (09-13-22 @ 02:27)  HR: 81 (09-13-22 @ 17:51) (63 - 103)  BP: 137/90 (09-13-22 @ 17:51) (110/74 - 150/80)  RR: 18 (09-13-22 @ 17:51) (16 - 18)  SpO2: 97% (09-13-22 @ 17:51) (94% - 100%)                          12.9   16.53 )-----------( 337      ( 12 Sep 2022 07:48 )             38.7     09-12    137  |  100  |  14  ----------------------------<  154<H>  4.1   |  26  |  0.65    Ca    9.2      12 Sep 2022 07:50      PT/INR - ( 12 Sep 2022 07:49 )   PT: 12.8 sec;   INR: 1.11 ratio         PTT - ( 12 Sep 2022 07:49 )  PTT:28.3 sec        CAPILLARY BLOOD GLUCOSE

## 2022-09-13 NOTE — PHYSICAL THERAPY INITIAL EVALUATION ADULT - ADDITIONAL COMMENTS
Pt lives with fiance and parents in a private home with 6+6 stairs to negotiate. Normally, pt is (I) with ADLs and functional mobility with no assistive device. Over the last week, pt has only been able to ambulate minimal distances and had to use a RW.

## 2022-09-13 NOTE — PHYSICAL THERAPY INITIAL EVALUATION ADULT - IMPAIRMENTS CONTRIBUTING TO GAIT DEVIATIONS, PT EVAL
impaired balance due to dec sensation to B feet/impaired balance/pain/decreased sensation/decreased strength

## 2022-09-13 NOTE — PRE-ANESTHESIA EVALUATION ADULT - NSANTHOSAYNRD_GEN_A_CORE
No. YOON screening performed.  STOP BANG Legend: 0-2 = LOW Risk; 3-4 = INTERMEDIATE Risk; 5-8 = HIGH Risk

## 2022-09-13 NOTE — BRIEF OPERATIVE NOTE - NSICDXBRIEFOPLAUNCH_GEN_ALL_CORE
Provider E-Visit time total (minutes): 4   <--- Click to Launch ICDx for PreOp, PostOp and Procedure

## 2022-09-13 NOTE — PHYSICAL THERAPY INITIAL EVALUATION ADULT - PERTINENT HX OF CURRENT PROBLEM, REHAB EVAL
37F patient of Dr. Patricio, p/w 2 days of worsening severe LBP radiating down posterior LLE w/numbness/paresthesia. No bowel/urine incontinence, no saddle anesthesia. MRI from 7/15 shows a disc herniation at L5-S1 with central and L foraminal stenosis. Patient has significant LBP with any movement. Pt s/p L5-S1 laminectomy and discectomy

## 2022-09-13 NOTE — PHYSICAL THERAPY INITIAL EVALUATION ADULT - BALANCE TRAINING, PT EVAL
[address]  [address], [address]  Phone: [phone]  Fax: [phone]    Patient: Flash Omalley MRN: [id number]  SSN: [id number]    YOB: 1934  Age: 82 y.o. Sex: female       Return Appointment: 1 month with Norberta Landau, MD    Instructions:   Left Lower Leg - Medial:  Cleanse wound and periwound with wound cleanser or normal saline. Alginate with silver(sheets); cut to size, apply to wound bed. Cover with ABD. Bilateral Unna boot with wound and lower extremity assessment. If there is any problem with the dressing (too tight, slides down,, etc.)  Patient to return to clinic to have re-wrapped by clinician. Dressing change 3x weekly. [facility] for wound care 3x weekly. Left Great Toe:  Cleanse wound and periwound with wound cleanser or normal saline. Apply Betadine DAILY. Home Health for wound care 3x weekly. Family to provide Great Toe care on alternate days. Elevate lower legs when at rest.  Diabetic Diet. Low Salt Diet. Adequate Hydration - water is best.      Should you experience increased redness, swelling, pain, foul odor, size of wound(s), or have a temperature over 101 degrees please contact the [address] at [phone] or if after hours contact your primary care physician or go to the hospital emergency department.     Signed By: Jeannie Doll RN     July 2, 2021 GOAL: Pt will improve sitting/standing balance by 1 grade to assist with functional mobility in 2 weeks.

## 2022-09-13 NOTE — PRE-ANESTHESIA EVALUATION ADULT - NSANTHPMHFT_GEN_ALL_CORE
37F with PMH chronic LBP and L/S radiculopathy admitted with 2 days of worsening severe LBP radiating down posterior LLE w/numbness/paresthesia admitted for surgery

## 2022-09-14 LAB
ANION GAP SERPL CALC-SCNC: 14 MMOL/L — SIGNIFICANT CHANGE UP (ref 5–17)
BUN SERPL-MCNC: 17 MG/DL — SIGNIFICANT CHANGE UP (ref 7–23)
CALCIUM SERPL-MCNC: 8.9 MG/DL — SIGNIFICANT CHANGE UP (ref 8.4–10.5)
CHLORIDE SERPL-SCNC: 99 MMOL/L — SIGNIFICANT CHANGE UP (ref 96–108)
CO2 SERPL-SCNC: 25 MMOL/L — SIGNIFICANT CHANGE UP (ref 22–31)
CREAT SERPL-MCNC: 0.73 MG/DL — SIGNIFICANT CHANGE UP (ref 0.5–1.3)
EGFR: 110 ML/MIN/1.73M2 — SIGNIFICANT CHANGE UP
GLUCOSE SERPL-MCNC: 194 MG/DL — HIGH (ref 70–99)
HCT VFR BLD CALC: 42.5 % — SIGNIFICANT CHANGE UP (ref 34.5–45)
HGB BLD-MCNC: 14 G/DL — SIGNIFICANT CHANGE UP (ref 11.5–15.5)
MCHC RBC-ENTMCNC: 28.9 PG — SIGNIFICANT CHANGE UP (ref 27–34)
MCHC RBC-ENTMCNC: 32.9 GM/DL — SIGNIFICANT CHANGE UP (ref 32–36)
MCV RBC AUTO: 87.8 FL — SIGNIFICANT CHANGE UP (ref 80–100)
NRBC # BLD: 0 /100 WBCS — SIGNIFICANT CHANGE UP (ref 0–0)
PLATELET # BLD AUTO: 381 K/UL — SIGNIFICANT CHANGE UP (ref 150–400)
POTASSIUM SERPL-MCNC: 3.7 MMOL/L — SIGNIFICANT CHANGE UP (ref 3.5–5.3)
POTASSIUM SERPL-SCNC: 3.7 MMOL/L — SIGNIFICANT CHANGE UP (ref 3.5–5.3)
RBC # BLD: 4.84 M/UL — SIGNIFICANT CHANGE UP (ref 3.8–5.2)
RBC # FLD: 12.5 % — SIGNIFICANT CHANGE UP (ref 10.3–14.5)
SODIUM SERPL-SCNC: 138 MMOL/L — SIGNIFICANT CHANGE UP (ref 135–145)
WBC # BLD: 19.46 K/UL — HIGH (ref 3.8–10.5)
WBC # FLD AUTO: 19.46 K/UL — HIGH (ref 3.8–10.5)

## 2022-09-14 PROCEDURE — 99232 SBSQ HOSP IP/OBS MODERATE 35: CPT

## 2022-09-14 RX ORDER — LINACLOTIDE 145 UG/1
145 CAPSULE, GELATIN COATED ORAL ONCE
Refills: 0 | Status: COMPLETED | OUTPATIENT
Start: 2022-09-14 | End: 2022-09-14

## 2022-09-14 RX ORDER — DEXAMETHASONE 0.5 MG/5ML
4 ELIXIR ORAL EVERY 8 HOURS
Refills: 0 | Status: DISCONTINUED | OUTPATIENT
Start: 2022-09-14 | End: 2022-09-15

## 2022-09-14 RX ORDER — PANTOPRAZOLE SODIUM 20 MG/1
40 TABLET, DELAYED RELEASE ORAL DAILY
Refills: 0 | Status: DISCONTINUED | OUTPATIENT
Start: 2022-09-14 | End: 2022-09-15

## 2022-09-14 RX ORDER — LINACLOTIDE 145 UG/1
145 CAPSULE, GELATIN COATED ORAL
Refills: 0 | Status: DISCONTINUED | OUTPATIENT
Start: 2022-09-15 | End: 2022-09-15

## 2022-09-14 RX ORDER — METHOCARBAMOL 500 MG/1
500 TABLET, FILM COATED ORAL THREE TIMES A DAY
Refills: 0 | Status: DISCONTINUED | OUTPATIENT
Start: 2022-09-14 | End: 2022-09-15

## 2022-09-14 RX ADMIN — LINACLOTIDE 145 MICROGRAM(S): 145 CAPSULE, GELATIN COATED ORAL at 11:55

## 2022-09-14 RX ADMIN — OXYCODONE HYDROCHLORIDE 10 MILLIGRAM(S): 5 TABLET ORAL at 20:58

## 2022-09-14 RX ADMIN — Medication 1 SUPPOSITORY(S): at 17:49

## 2022-09-14 RX ADMIN — Medication 4 MILLIGRAM(S): at 04:38

## 2022-09-14 RX ADMIN — Medication 100 MILLIGRAM(S): at 10:34

## 2022-09-14 RX ADMIN — PANTOPRAZOLE SODIUM 40 MILLIGRAM(S): 20 TABLET, DELAYED RELEASE ORAL at 13:39

## 2022-09-14 RX ADMIN — METHOCARBAMOL 500 MILLIGRAM(S): 500 TABLET, FILM COATED ORAL at 21:00

## 2022-09-14 RX ADMIN — Medication 10 MILLIGRAM(S): at 04:38

## 2022-09-14 RX ADMIN — OXYCODONE HYDROCHLORIDE 10 MILLIGRAM(S): 5 TABLET ORAL at 05:25

## 2022-09-14 RX ADMIN — POLYETHYLENE GLYCOL 3350 17 GRAM(S): 17 POWDER, FOR SOLUTION ORAL at 11:53

## 2022-09-14 RX ADMIN — METHOCARBAMOL 500 MILLIGRAM(S): 500 TABLET, FILM COATED ORAL at 13:39

## 2022-09-14 RX ADMIN — OXYCODONE HYDROCHLORIDE 10 MILLIGRAM(S): 5 TABLET ORAL at 04:37

## 2022-09-14 RX ADMIN — METHOCARBAMOL 500 MILLIGRAM(S): 500 TABLET, FILM COATED ORAL at 04:37

## 2022-09-14 RX ADMIN — Medication 4 MILLIGRAM(S): at 11:52

## 2022-09-14 RX ADMIN — Medication 10 MILLIGRAM(S): at 20:59

## 2022-09-14 RX ADMIN — SENNA PLUS 2 TABLET(S): 8.6 TABLET ORAL at 20:59

## 2022-09-14 RX ADMIN — METHOCARBAMOL 500 MILLIGRAM(S): 500 TABLET, FILM COATED ORAL at 10:33

## 2022-09-14 RX ADMIN — Medication 400 MILLIGRAM(S): at 10:33

## 2022-09-14 RX ADMIN — Medication 100 MILLIGRAM(S): at 01:43

## 2022-09-14 RX ADMIN — OXYCODONE HYDROCHLORIDE 10 MILLIGRAM(S): 5 TABLET ORAL at 12:52

## 2022-09-14 RX ADMIN — ENOXAPARIN SODIUM 40 MILLIGRAM(S): 100 INJECTION SUBCUTANEOUS at 20:59

## 2022-09-14 RX ADMIN — Medication 4 MILLIGRAM(S): at 21:00

## 2022-09-14 RX ADMIN — OXYCODONE HYDROCHLORIDE 10 MILLIGRAM(S): 5 TABLET ORAL at 11:52

## 2022-09-14 RX ADMIN — OXYCODONE HYDROCHLORIDE 10 MILLIGRAM(S): 5 TABLET ORAL at 21:58

## 2022-09-14 RX ADMIN — Medication 1000 MILLIGRAM(S): at 10:48

## 2022-09-14 NOTE — PROGRESS NOTE ADULT - SUBJECTIVE AND OBJECTIVE BOX
Patient is a 37y old  Female who presents with a chief complaint of low back pain (14 Sep 2022 11:51)      DATE OF SERVICE: 09-14-22 @ 14:25    SUBJECTIVE / OVERNIGHT EVENTS: overnight events noted    ROS:  Resp: No cough no sputum production  CVS: No chest pain no palpitations no orthopnea  GI: no N/V/D  : no dysuria, no hematuria  Neuro: no weakness LE paresthesia bilateral soles and legs          MEDICATIONS  (STANDING):  bisacodyl 10 milliGRAM(s) Oral at bedtime  dexAMETHasone     Tablet 4 milliGRAM(s) Oral every 6 hours  enoxaparin Injectable 40 milliGRAM(s) SubCutaneous every 24 hours  hydrocortisone hemorrhoidal Suppository 1 Suppository(s) Rectal two times a day  methocarbamol 500 milliGRAM(s) Oral three times a day  pantoprazole    Tablet 40 milliGRAM(s) Oral daily  polyethylene glycol 3350 17 Gram(s) Oral daily  senna 2 Tablet(s) Oral at bedtime  sodium chloride 0.9%. 1000 milliLiter(s) (70 mL/Hr) IV Continuous <Continuous>    MEDICATIONS  (PRN):  HYDROmorphone  Injectable 0.5 milliGRAM(s) IV Push every 4 hours PRN BREAKTHROUGH Pain  magnesium hydroxide Suspension 30 milliLiter(s) Oral daily PRN Constipation  melatonin 5 milliGRAM(s) Oral at bedtime PRN Sleep  oxyCODONE    IR 5 milliGRAM(s) Oral every 4 hours PRN Moderate Pain (4 - 6)  oxyCODONE    IR 10 milliGRAM(s) Oral every 4 hours PRN Severe Pain (7 - 10)        CAPILLARY BLOOD GLUCOSE        I&O's Summary    13 Sep 2022 07:01  -  14 Sep 2022 07:00  --------------------------------------------------------  IN: 0 mL / OUT: 2025 mL / NET: -2025 mL    14 Sep 2022 07:01  -  14 Sep 2022 14:25  --------------------------------------------------------  IN: 300 mL / OUT: 1350 mL / NET: -1050 mL        Vital Signs Last 24 Hrs  T(C): 36.4 (14 Sep 2022 12:17), Max: 36.9 (14 Sep 2022 00:15)  T(F): 97.6 (14 Sep 2022 12:17), Max: 98.4 (14 Sep 2022 00:15)  HR: 85 (14 Sep 2022 12:17) (65 - 113)  BP: 128/88 (14 Sep 2022 12:17) (112/72 - 150/80)  BP(mean): 97 (13 Sep 2022 16:45) (89 - 104)  RR: 18 (14 Sep 2022 12:17) (16 - 18)  SpO2: 96% (14 Sep 2022 12:17) (94% - 98%)    PHYSICAL EXAM:  GENERAL: NAD  EYES: EOMI, PERRLA,   NECK: Supple, No JVD  CHEST/LUNG: Clear   HEART: S1S2; no murmurs  ABDOMEN: Soft, Nontender  EXTREMITIES:  no edema  NEUROLOGY: Alert ++ anxiety     LABS:                        14.0   19.46 )-----------( 381      ( 14 Sep 2022 14:08 )             42.5                       All consultant(s) notes reviewed and care discussed with other providers        Contact Number, Dr Tong [phone]

## 2022-09-14 NOTE — PROGRESS NOTE ADULT - SUBJECTIVE AND OBJECTIVE BOX
SUBJECTIVE:   B/L LE feet & thigh  & perineal numbness persists. LLE radicular pain resolved . OOB to chair   OVERNIGHT EVENTS: none    Vital Signs Last 24 Hrs  T(C): 36.7 (14 Sep 2022 08:15), Max: 36.9 (14 Sep 2022 00:15)  T(F): 98 (14 Sep 2022 08:15), Max: 98.4 (14 Sep 2022 00:15)  HR: 65 (14 Sep 2022 08:15) (65 - 113)  BP: 135/88 (14 Sep 2022 08:15) (112/72 - 150/80)  BP(mean): 97 (13 Sep 2022 16:45) (89 - 108)  RR: 18 (14 Sep 2022 08:15) (16 - 18)  SpO2: 95% (14 Sep 2022 08:15) (94% - 100%)    Parameters below as of 14 Sep 2022 08:15  Patient On (Oxygen Delivery Method): room air        PHYSICAL EXAM:    Constitutional: No Acute Distress     Neurological: Awake alert  Ox3, Speech clear Following Commands, Moving all Extremities 5/5 Sensation slightly decreased to light touch b/l LE Lower back dressing C/D/I     Pulmonary: Clear to Auscultation,     Cardiovascular: S1, S2, Regular rate and rhythm     Gastrointestinal: Soft, Non-tender, Non-distended     Extremities: No calf tenderness       LABS:           IMAGING:         MEDICATIONS:    HYDROmorphone  Injectable 0.5 milliGRAM(s) IV Push every 4 hours PRN BREAKTHROUGH Pain  melatonin 5 milliGRAM(s) Oral at bedtime PRN Sleep  methocarbamol 500 milliGRAM(s) Oral three times a day  oxyCODONE    IR 5 milliGRAM(s) Oral every 4 hours PRN Moderate Pain (4 - 6)  oxyCODONE    IR 10 milliGRAM(s) Oral every 4 hours PRN Severe Pain (7 - 10)  bisacodyl 10 milliGRAM(s) Oral at bedtime  linaclotide 145 MICROGram(s) Oral once  magnesium hydroxide Suspension 30 milliLiter(s) Oral daily PRN Constipation  polyethylene glycol 3350 17 Gram(s) Oral daily  senna 2 Tablet(s) Oral at bedtime   dexAMETHasone     Tablet 4 milliGRAM(s) Oral every 6 hours  enoxaparin Injectable 40 milliGRAM(s) SubCutaneous every 24 hours  hydrocortisone hemorrhoidal Suppository 1 Suppository(s) Rectal two times a day  sodium chloride 0.9%. 1000 milliLiter(s) IV Continuous <Continuous>      DIET:

## 2022-09-14 NOTE — PROGRESS NOTE ADULT - PROBLEM SELECTOR PLAN 4
management per Neurosurgery   OR Tue
discussed with Neurosurgery attending  s/p surgery   severe constipation  continue to follow GI recommendations
discussed with Neurosurgery attending  no appreciable change in clinical status  OR tomorrow

## 2022-09-14 NOTE — PROGRESS NOTE ADULT - SUBJECTIVE AND OBJECTIVE BOX
Interval Events: pt seen and examined. sp laminectomy/discectomy yesterday. had bms yesterday sp moviprep, feels like she will pass gas this am. tolerating diet. denies n/v/abd pain. no new labs      Allergies:  No Known Allergies      Hospital Medications:  acetaminophen   IVPB .. 1000 milliGRAM(s) IV Intermittent once  bisacodyl 10 milliGRAM(s) Oral at bedtime  ceFAZolin   IVPB 2000 milliGRAM(s) IV Intermittent every 8 hours  dexAMETHasone     Tablet 4 milliGRAM(s) Oral every 6 hours  enoxaparin Injectable 40 milliGRAM(s) SubCutaneous every 24 hours  hydrocortisone hemorrhoidal Suppository 1 Suppository(s) Rectal two times a day  HYDROmorphone  Injectable 0.5 milliGRAM(s) IV Push every 4 hours PRN  magnesium hydroxide Suspension 30 milliLiter(s) Oral daily PRN  melatonin 5 milliGRAM(s) Oral at bedtime PRN  methocarbamol 500 milliGRAM(s) Oral three times a day  oxyCODONE    IR 5 milliGRAM(s) Oral every 4 hours PRN  oxyCODONE    IR 10 milliGRAM(s) Oral every 4 hours PRN  polyethylene glycol 3350 17 Gram(s) Oral daily  senna 2 Tablet(s) Oral at bedtime  sodium chloride 0.9%. 1000 milliLiter(s) IV Continuous <Continuous>      ROS: As per HPI, otherwise 10-point ROS reviewed and negative.    Vital Signs:  Vital Signs Last 24 Hrs  T(C): 36.7 (14 Sep 2022 08:15), Max: 36.9 (14 Sep 2022 00:15)  T(F): 98 (14 Sep 2022 08:15), Max: 98.4 (14 Sep 2022 00:15)  HR: 65 (14 Sep 2022 08:15) (65 - 113)  BP: 135/88 (14 Sep 2022 08:15) (112/72 - 150/80)  BP(mean): 97 (13 Sep 2022 16:45) (89 - 108)  RR: 18 (14 Sep 2022 08:15) (16 - 18)  SpO2: 95% (14 Sep 2022 08:15) (94% - 100%)    Parameters below as of 14 Sep 2022 08:15  Patient On (Oxygen Delivery Method): room air      Daily     Daily       09-13-22 @ 07:01  -  09-14-22 @ 07:00  --------------------------------------------------------  IN: 0 mL / OUT: 2025 mL / NET: -2025 mL    09-14-22 @ 07:01  -  09-14-22 @ 10:11  --------------------------------------------------------  IN: 0 mL / OUT: 1200 mL / NET: -1200 mL        LABS:                                          12.9   16.53 )-----------( 337      ( 12 Sep 2022 07:48 )             38.7       PHYSICAL EXAM  GENERAL:  Lying in bed in NAD  HEENT:  NCAT, no scleral icterus  NECK: Trachea midline  CHEST:  Resp even, non labored  ABDOMEN:  Soft, non-tender, non-distended   EXTREMITIES:  WWP, no edema  SKIN:  No visible rash or jaundice  NEURO:  Alert and oriented x 3

## 2022-09-15 ENCOUNTER — TRANSCRIPTION ENCOUNTER (OUTPATIENT)
Age: 37
End: 2022-09-15

## 2022-09-15 VITALS
SYSTOLIC BLOOD PRESSURE: 124 MMHG | RESPIRATION RATE: 18 BRPM | OXYGEN SATURATION: 94 % | DIASTOLIC BLOOD PRESSURE: 76 MMHG | HEART RATE: 73 BPM | TEMPERATURE: 98 F

## 2022-09-15 LAB
ANION GAP SERPL CALC-SCNC: 10 MMOL/L — SIGNIFICANT CHANGE UP (ref 5–17)
BUN SERPL-MCNC: 17 MG/DL — SIGNIFICANT CHANGE UP (ref 7–23)
CALCIUM SERPL-MCNC: 8.8 MG/DL — SIGNIFICANT CHANGE UP (ref 8.4–10.5)
CHLORIDE SERPL-SCNC: 100 MMOL/L — SIGNIFICANT CHANGE UP (ref 96–108)
CO2 SERPL-SCNC: 26 MMOL/L — SIGNIFICANT CHANGE UP (ref 22–31)
CREAT SERPL-MCNC: 0.64 MG/DL — SIGNIFICANT CHANGE UP (ref 0.5–1.3)
EGFR: 118 ML/MIN/1.73M2 — SIGNIFICANT CHANGE UP
GLUCOSE SERPL-MCNC: 165 MG/DL — HIGH (ref 70–99)
HCT VFR BLD CALC: 39.3 % — SIGNIFICANT CHANGE UP (ref 34.5–45)
HGB BLD-MCNC: 13 G/DL — SIGNIFICANT CHANGE UP (ref 11.5–15.5)
MCHC RBC-ENTMCNC: 29.1 PG — SIGNIFICANT CHANGE UP (ref 27–34)
MCHC RBC-ENTMCNC: 33.1 GM/DL — SIGNIFICANT CHANGE UP (ref 32–36)
MCV RBC AUTO: 87.9 FL — SIGNIFICANT CHANGE UP (ref 80–100)
NRBC # BLD: 0 /100 WBCS — SIGNIFICANT CHANGE UP (ref 0–0)
PLATELET # BLD AUTO: 318 K/UL — SIGNIFICANT CHANGE UP (ref 150–400)
POTASSIUM SERPL-MCNC: 4.2 MMOL/L — SIGNIFICANT CHANGE UP (ref 3.5–5.3)
POTASSIUM SERPL-SCNC: 4.2 MMOL/L — SIGNIFICANT CHANGE UP (ref 3.5–5.3)
RBC # BLD: 4.47 M/UL — SIGNIFICANT CHANGE UP (ref 3.8–5.2)
RBC # FLD: 12.4 % — SIGNIFICANT CHANGE UP (ref 10.3–14.5)
SODIUM SERPL-SCNC: 136 MMOL/L — SIGNIFICANT CHANGE UP (ref 135–145)
WBC # BLD: 15.08 K/UL — HIGH (ref 3.8–10.5)
WBC # FLD AUTO: 15.08 K/UL — HIGH (ref 3.8–10.5)

## 2022-09-15 PROCEDURE — 99232 SBSQ HOSP IP/OBS MODERATE 35: CPT

## 2022-09-15 RX ORDER — POLYETHYLENE GLYCOL 3350 17 G/17G
17 POWDER, FOR SOLUTION ORAL
Qty: 0 | Refills: 0 | DISCHARGE
Start: 2022-09-15

## 2022-09-15 RX ORDER — HYDROCORTISONE 1 %
1 OINTMENT (GRAM) TOPICAL
Qty: 10 | Refills: 0
Start: 2022-09-15

## 2022-09-15 RX ORDER — METHOCARBAMOL 500 MG/1
1 TABLET, FILM COATED ORAL
Qty: 30 | Refills: 0
Start: 2022-09-15 | End: 2022-09-24

## 2022-09-15 RX ADMIN — Medication 10 MILLIGRAM(S): at 11:40

## 2022-09-15 RX ADMIN — OXYCODONE HYDROCHLORIDE 10 MILLIGRAM(S): 5 TABLET ORAL at 13:27

## 2022-09-15 RX ADMIN — PANTOPRAZOLE SODIUM 40 MILLIGRAM(S): 20 TABLET, DELAYED RELEASE ORAL at 11:41

## 2022-09-15 RX ADMIN — OXYCODONE HYDROCHLORIDE 10 MILLIGRAM(S): 5 TABLET ORAL at 06:40

## 2022-09-15 RX ADMIN — Medication 5 MILLIGRAM(S): at 01:07

## 2022-09-15 RX ADMIN — OXYCODONE HYDROCHLORIDE 10 MILLIGRAM(S): 5 TABLET ORAL at 14:27

## 2022-09-15 RX ADMIN — OXYCODONE HYDROCHLORIDE 10 MILLIGRAM(S): 5 TABLET ORAL at 01:40

## 2022-09-15 RX ADMIN — LINACLOTIDE 145 MICROGRAM(S): 145 CAPSULE, GELATIN COATED ORAL at 05:54

## 2022-09-15 RX ADMIN — OXYCODONE HYDROCHLORIDE 10 MILLIGRAM(S): 5 TABLET ORAL at 01:07

## 2022-09-15 RX ADMIN — METHOCARBAMOL 500 MILLIGRAM(S): 500 TABLET, FILM COATED ORAL at 05:54

## 2022-09-15 RX ADMIN — OXYCODONE HYDROCHLORIDE 10 MILLIGRAM(S): 5 TABLET ORAL at 05:55

## 2022-09-15 RX ADMIN — Medication 4 MILLIGRAM(S): at 05:55

## 2022-09-15 RX ADMIN — Medication 4 MILLIGRAM(S): at 13:28

## 2022-09-15 RX ADMIN — METHOCARBAMOL 500 MILLIGRAM(S): 500 TABLET, FILM COATED ORAL at 13:28

## 2022-09-15 RX ADMIN — Medication 1 SUPPOSITORY(S): at 11:40

## 2022-09-15 RX ADMIN — POLYETHYLENE GLYCOL 3350 17 GRAM(S): 17 POWDER, FOR SOLUTION ORAL at 11:40

## 2022-09-15 NOTE — PROGRESS NOTE ADULT - NS ATTEND AMEND GEN_ALL_CORE FT
37yF had alminectomy for LBP  ch constipation was addressed by GI  Had BM yesterday  cont Bowel regimen as suggested  agree with sign off
post laminectomy abd pain and acute on ch constipation  No acute abd signs  cont Bowel Regimen
Pt seen and examined. ambulating in [place] with PT and walker.  leg pain markedly better.  still with S1 numbness.  Voiding well since walker d/c'd    Plan for d/c with Home PT

## 2022-09-15 NOTE — PROGRESS NOTE ADULT - SUBJECTIVE AND OBJECTIVE BOX
SUBJECTIVE:   Patient seen & examined. Ambulated with PT . Incisional pain & b/l LE numbness persists . No radicular pain Constipation+   OVERNIGHT EVENTS: none    Vital Signs Last 24 Hrs  T(C): 37.1 (15 Sep 2022 08:54), Max: 37.1 (15 Sep 2022 08:54)  T(F): 98.7 (15 Sep 2022 08:54), Max: 98.7 (15 Sep 2022 08:54)  HR: 83 (15 Sep 2022 08:54) (71 - 91)  BP: 116/79 (15 Sep 2022 08:54) (113/72 - 137/84)  RR: 18 (15 Sep 2022 08:54) (18 - 18)  SpO2: 96% (15 Sep 2022 08:54) (96% - 98%)    Parameters below as of 15 Sep 2022 08:54  Patient On (Oxygen Delivery Method): room air        PHYSICAL EXAM:    Constitutional: No Acute Distress     Neurological: Awake alert Ox3, Speech clear Following Commands, Moving all Extremities 5/5 . Lumbar incision dressing C/D/I     Pulmonary: Clear to Auscultation,     Cardiovascular: S1, S2, Regular rate and rhythm     Gastrointestinal: Soft, Non-tender, Non-distended     Extremities: No calf tenderness       LABS:                        13.0   15.08 )-----------( 318      ( 15 Sep 2022 07:15 )             39.3    09-15    136  |  100  |  17  ----------------------------<  165<H>  4.2   |  26  |  0.64    Ca    8.8      15 Sep 2022 07:17      IMAGING:         MEDICATIONS:    HYDROmorphone  Injectable 0.5 milliGRAM(s) IV Push every 4 hours PRN BREAKTHROUGH Pain  melatonin 5 milliGRAM(s) Oral at bedtime PRN Sleep  methocarbamol 500 milliGRAM(s) Oral three times a day  oxyCODONE    IR 5 milliGRAM(s) Oral every 4 hours PRN Moderate Pain (4 - 6)  oxyCODONE    IR 10 milliGRAM(s) Oral every 4 hours PRN Severe Pain (7 - 10)  linaclotide 145 MICROGram(s) Oral before breakfast  magnesium hydroxide Suspension 30 milliLiter(s) Oral daily PRN Constipation  pantoprazole    Tablet 40 milliGRAM(s) Oral daily  polyethylene glycol 3350 17 Gram(s) Oral daily  senna 2 Tablet(s) Oral at bedtime  dexAMETHasone     Tablet 4 milliGRAM(s) Oral every 8 hours  enoxaparin Injectable 40 milliGRAM(s) SubCutaneous every 24 hours  hydrocortisone hemorrhoidal Suppository 1 Suppository(s) Rectal two times a day      DIET:      SUBJECTIVE:   Patient seen & examined. Ambulated with PT . Incisional pain & b/l LE numbness persists . No radicular pain Constipation+   OVERNIGHT EVENTS: none    Vital Signs Last 24 Hrs  T(C): 37.1 (15 Sep 2022 08:54), Max: 37.1 (15 Sep 2022 08:54)  T(F): 98.7 (15 Sep 2022 08:54), Max: 98.7 (15 Sep 2022 08:54)  HR: 83 (15 Sep 2022 08:54) (71 - 91)  BP: 116/79 (15 Sep 2022 08:54) (113/72 - 137/84)  RR: 18 (15 Sep 2022 08:54) (18 - 18)  SpO2: 96% (15 Sep 2022 08:54) (96% - 98%)    Parameters below as of 15 Sep 2022 08:54  Patient On (Oxygen Delivery Method): room air        PHYSICAL EXAM:    Constitutional: No Acute Distress     Neurological: Awake alert Ox3, Speech clear Following Commands, Moving all Extremities 5/5 . Lumbar incision staples  C/D/I     Pulmonary: Clear to Auscultation,     Cardiovascular: S1, S2, Regular rate and rhythm     Gastrointestinal: Soft, Non-tender, Non-distended     Extremities: No calf tenderness       LABS:                        13.0   15.08 )-----------( 318      ( 15 Sep 2022 07:15 )             39.3    09-15    136  |  100  |  17  ----------------------------<  165<H>  4.2   |  26  |  0.64    Ca    8.8      15 Sep 2022 07:17      IMAGING:         MEDICATIONS:    HYDROmorphone  Injectable 0.5 milliGRAM(s) IV Push every 4 hours PRN BREAKTHROUGH Pain  melatonin 5 milliGRAM(s) Oral at bedtime PRN Sleep  methocarbamol 500 milliGRAM(s) Oral three times a day  oxyCODONE    IR 5 milliGRAM(s) Oral every 4 hours PRN Moderate Pain (4 - 6)  oxyCODONE    IR 10 milliGRAM(s) Oral every 4 hours PRN Severe Pain (7 - 10)  linaclotide 145 MICROGram(s) Oral before breakfast  magnesium hydroxide Suspension 30 milliLiter(s) Oral daily PRN Constipation  pantoprazole    Tablet 40 milliGRAM(s) Oral daily  polyethylene glycol 3350 17 Gram(s) Oral daily  senna 2 Tablet(s) Oral at bedtime  dexAMETHasone     Tablet 4 milliGRAM(s) Oral every 8 hours  enoxaparin Injectable 40 milliGRAM(s) SubCutaneous every 24 hours  hydrocortisone hemorrhoidal Suppository 1 Suppository(s) Rectal two times a day      DIET:

## 2022-09-15 NOTE — DISCHARGE NOTE PROVIDER - NSDCFUADDINST_GEN_ALL_CORE_FT
Return to ER if develops fevers, bleeding , wound drainage, uncontrolled pain, weakness of extremities, lethargy or sluggishness..  Do not take Aspirin Ibuprofen  (Motrin)  , Naproxen ( aleve)  or Meloxicam  for pain.

## 2022-09-15 NOTE — PROGRESS NOTE ADULT - PROBLEM SELECTOR PROBLEM 3
Back pain
Suspected deep vein thrombosis (DVT)

## 2022-09-15 NOTE — PROGRESS NOTE ADULT - NSPROGADDITIONALINFOA_GEN_ALL_CORE
discussed with patient in detail, expresses understanding of treatment plans.
discussed with patient in detail, expresses understanding of treatment plans.  discussed with patient's mother at bedside in detail
discussed with patient in detail, expresses understanding of treatment plans.

## 2022-09-15 NOTE — DISCHARGE NOTE PROVIDER - CARE PROVIDER_API CALL
Austin Patricio (MD)  Neurosurgery  [address], [address]  [place] [address]  Phone: [phone]  Fax: [phone]  Follow Up Time:

## 2022-09-15 NOTE — PROGRESS NOTE ADULT - PROBLEM SELECTOR PLAN 1
s/p lumbar laminectomy  doing well  management per Neurosurgery
patient is medically optimized for OR  discussed with Neurosurgery
s/p lumbar laminectomy  doing well  management per Neurosurgery
patient is medically optimized for OR  will continue to monitor

## 2022-09-15 NOTE — PROGRESS NOTE ADULT - PROBLEM SELECTOR PLAN 2
likely reactive to dexamethasone  will continue to monitor  no evidence infection
likely reactive to dexamethasone  will continue to monitor  no evidence infection  improved
likely reactive to dexamethasone  will continue to monitor  no evidence infection
likely reactive to dexamethasone  will continue to monitor  no evidence infection

## 2022-09-15 NOTE — DISCHARGE NOTE PROVIDER - NSDCMRMEDTOKEN_GEN_ALL_CORE_FT
dexamethasone 4 mg oral tablet: 1 tab(s) orally every 8 hours today  1 tab q12 until 9/17/22 then 1 tab daily am until 9/18/22 &amp; stop   hydrocortisone 25 mg rectal suppository: 1 suppository(ies) rectal 2 times a day  linaclotide 145 mcg oral capsule: 1 cap(s) orally once a day  methocarbamol 500 mg oral tablet: 1 tab(s) orally 3 times a day  oxyCODONE 10 mg oral tablet: 1 tab(s) orally every 6 to 8 hours, As Needed -Severe Pain (7 - 10) MDD:4  pantoprazole 40 mg oral delayed release tablet: 1 tab(s) orally once a day  polyethylene glycol 3350 oral powder for reconstitution: 17 gram(s) orally once a day

## 2022-09-15 NOTE — PROGRESS NOTE ADULT - PROBLEM SELECTOR PLAN 3
bilateral venous duplex to r/o DVT negative
bilateral venous duplex to r/o DVT pending
discussed with Neurosurgery attending  s/p surgery   severe constipation  continue to follow GI recommendations
bilateral venous duplex to r/o DVT negative

## 2022-09-15 NOTE — DISCHARGE NOTE PROVIDER - NSDCFUSCHEDAPPT_GEN_ALL_CORE_FT
Madisyn Torres  [facility]  [facility] [address]  Scheduled Appointment: 09/23/2022

## 2022-09-15 NOTE — DISCHARGE NOTE NURSING/CASE MANAGEMENT/SOCIAL WORK - PATIENT PORTAL LINK FT
You can access the FollowMyHealth Patient Portal offered by [facility] by registering at the following website: [email or web address]. By joining [employer]’s FollowMyHealth portal, you will also be able to view your health information using other applications (apps) compatible with our system.

## 2022-09-15 NOTE — PROGRESS NOTE ADULT - ASSESSMENT
37F with PMH chronic LBP and L/S radiculopathy admitted with 2 days of worsening severe LBP radiating down posterior LLE w/numbness/paresthesia admitted for surgery. 9/13 sp OR for laminectomy w/ discectomy     #Acute on Chronic constipation - improving, with aggressive bowel regimen, tsh wnl  #Hemorrhoids (non bleeding non thrombosed)    -continue Miralax, Senna, prn dulcolax/mom  -Continue home dose Linzess  -Anusol HC supp BID for hemorrhoidal discomfort  -recommend outpatient gi f/u upon dc and eventual outpt colonoscopy once recovered from neurosurgery standpoint  -further care as per primary team    No GI objection to discharge plans; Discussed with pt it ma take 1-2 days to pass BM given aggressive bowel regimen pre-op  Discussed with pt, all questions answered    Will Sign off care. Please recall prn for GI concerns.  Thank You.    Ryan Crawford PA-C    [facility]  [phone]  After hours and weekend coverage [phone]  
Patient seen and examined at bedside s/p L5-S1 laminectomy/discectomy, recovering well.   -PACU Floor, cleared after 4 hours.  -Q4 neurochecks  -Q4 vitals  -Pain control  -Advance diet as tolerated  -PT/OT  -Has Taylor  -Should get Taylor out tomorrow AM.   -Dex 4q6  
37F with PMH chronic LBP and L/S radiculopathy admitted with 2 days of worsening severe LBP radiating down posterior LLE w/numbness/paresthesia admitted for surgery. 9/13 sp OR for laminectomy w/ discectomy     #Acute on Chronic constipation - improving, with aggressive bowel regimen, tsh wnl  #Hemorrhoids (non bleeding non thrombosed)    -continue Miralax, Senna, prn dulcolax/mom  -please restart pt's home Linzess  -cont anusol HC supp DE   -recommend outpatient gi f/u upon dc and eventual outpt colonoscopy once recovered from neurosurgery standpoint  -further care as per primary team    will sign off at this time, please reach out with questions   RED Mata PA-C, RD, CDN  available on TEAMS for questions    after 5pm/weekends, please contact the on-call GI fellow at [phone]   
37F with PMH chronic LBP and L/S radiculopathy admitted with 2 days of worsening severe LBP radiating down posterior LLE,  w/numbness/paresthesia b/l thigh/ groin/ perineal area & constipation . No urinary retention. MRI shows central/left disc hernation at L5/S1 with what appears to be fragment extending down behind body of S1. s/p L5-S1 laminectomy and discectomy 9/13/22. Post op course uneventful    Plan    Neuro stable. Steroid taper over 1 week  Vitals stable  C/h constipation On Linzess . Dulcolax pr . Does not want enema   labs acceptable. Leucocytosis likely related to steroids   Pain control- on Oxy 10mg prn & Robaxin 500 q8  DVT/ GI ppx   Possible d/c home later today if + BM 
37F patient of Dr. Patricio, p/w 2 days of worsening severe LBP radiating down posterior LLE w/numbness/paresthesia. No bowel/urine incontinence, no saddle anesthesia. MRI from 7/15 shows a disc herniation at L5-S1 with central and L foraminal stenosis. Patient has significant LBP with any movement.     PLAN:  -Neuro: plan for OR on 9/13 with Dr. Patricio, exam stable, L5-S1 decomp  -Hospitalist called for medical clearance - cleared 9/10  -Continue decadron 4q6  -added gabapentin   -Repeat MRI T2 Sag/Ax today 11am  -LED this AM - fu   -preop labs ordered for AM  -Oxy for pain control  -Flexeril for muscle spasms  -Continue home meds  -Regular diet  -Bowel regimen  -DVT ppx: venodynes and sql  -PT: pending    Spectra #[id number]   
37F patient of Dr. Patricio, p/w 2 days of worsening severe LBP radiating down posterior LLE w/numbness/paresthesia. No bowel/urine incontinence, no saddle anesthesia. MRI from 7/15 shows a disc herniation at L5-S1 with central and L foraminal stenosis. Patient has significant LBP with any movement. (09 Sep 2022 14:47)      PLAN:  -Neuro: plan for OR on 9/13 with Dr. Patricio  -Hospitalist called for medical clearance  -Continue decadron  -Oxy for pain control  -Flexeril for muscle spasms  -Continue home meds  -Regular diet  -Bowel regimen  -DVT ppx: venodynes and sql  -PT: pending    Will discuss above with Dr. Patricio  Spectra #[phone]   
37F with PMH chronic LBP and L/S radiculopathy admitted with 2 days of worsening severe LBP radiating down posterior LLE w/numbness/paresthesia admitted for surgery 
37F with PMH chronic LBP and L/S radiculopathy admitted with 2 days of worsening severe LBP radiating down posterior LLE,  w/numbness/paresthesia b/l thigh/ groin/ perineal area & constipation . No urinary retention. MRI shows central/left disc hernation at L5/S1 with what appears to be fragment extending down behind body of S1. s/p L5-S1 laminectomy and discectomy 9/13/22. Post op course uneventful    Plan    Neuro stable. Dex taper over 1 week  Vitals stable  PT pending  D/c Taylor . Trial of void   Bowel regimen- restarted home Linzess. miralax QD  Pain control- On Oxy 10mg prn. Robaxin 500 q8  DVT ppx    
HPI:  Patient is a 37 year old female with two days of worsening low back pain that radiated down the left lower extremity w/ associated numbness and paresthesias.  Found to have disc herniation at :5-S1.  Admitted for neurosurgical management.    PLAN:  Neuro:   -q4 hour neuro checks  -oxycodone for pain control  -neurontin for neuropathic pain  -decadron 4q6  -flexeril and robaxin for muscle spasms  -valium prn anxiety  -out of bed with assistance  -pt eval post op    Respiratory:   -satting well on room air  -incentive spirometer for lung expansion    CV:  -keep sbp 100-140    Endocrine:   -maintain euglycemia    Heme/Onc:    -chemical dvt prophylaxis on hold for OR tomorrow  -scds for dvt prophylaxis    Renal:   -voiding    ID:   -leukocytosis, likely reactive from decadron, has been afebrile, trend cbc    GI:   -regular diet, npo/ivf @ midnight  -senna, miralax, and linzess for bowel regimen  -if no bowel movement this am will add dulcolax suppository      Spectra #[id number]
37F with PMH chronic LBP and L/S radiculopathy admitted with 2 days of worsening severe LBP radiating down posterior LLE w/numbness/paresthesia admitted for surgery 

## 2022-09-15 NOTE — DISCHARGE NOTE PROVIDER - HOSPITAL COURSE
37F with PMH chronic LBP and  radiculopathy admitted with 2 days of worsening severe LBP radiating down posterior Left Lower Extremity ,  with /numbness/paresthesia bilateral thigh/ groin/ perineal area & constipation . No urinary retention. MRI shows central/left disc hernation at L5/S1 with what appears to be fragment extending down behind body of S1. Started on steroids. Seen by GI for c/h constipation. Relieved with enema 9/12/22    s/p L5-S1 laminectomy and discectomy 9/13/22. Post op course uneventful. Restarted on Linzess.  Evaluated by PT & recommended for Home PT. Discharged home in stable condition

## 2022-09-15 NOTE — PROGRESS NOTE ADULT - PROVIDER SPECIALTY LIST ADULT
Gastroenterology
Neurosurgery
Neurosurgery
Gastroenterology
Neurosurgery
Internal Medicine

## 2022-09-15 NOTE — PROGRESS NOTE ADULT - SUBJECTIVE AND OBJECTIVE BOX
Patient is a 37y old  Female who presents with a chief complaint of low back pain (15 Sep 2022 13:49)      DATE OF SERVICE: 09-15-22 @ 15:26    SUBJECTIVE / OVERNIGHT EVENTS: overnight events noted    ROS:  Resp: No cough no sputum production  CVS: No chest pain no palpitations no orthopnea  GI: no N/V/D  : no dysuria, no hematuria  Neuro: no weakness no paresthesias  back pain improving        MEDICATIONS  (STANDING):  dexAMETHasone     Tablet 4 milliGRAM(s) Oral every 8 hours  enoxaparin Injectable 40 milliGRAM(s) SubCutaneous every 24 hours  hydrocortisone hemorrhoidal Suppository 1 Suppository(s) Rectal two times a day  linaclotide 145 MICROGram(s) Oral before breakfast  methocarbamol 500 milliGRAM(s) Oral three times a day  pantoprazole    Tablet 40 milliGRAM(s) Oral daily  polyethylene glycol 3350 17 Gram(s) Oral daily  senna 2 Tablet(s) Oral at bedtime    MEDICATIONS  (PRN):  melatonin 5 milliGRAM(s) Oral at bedtime PRN Sleep  oxyCODONE    IR 5 milliGRAM(s) Oral every 4 hours PRN Moderate Pain (4 - 6)  oxyCODONE    IR 10 milliGRAM(s) Oral every 4 hours PRN Severe Pain (7 - 10)        CAPILLARY BLOOD GLUCOSE        I&O's Summary    14 Sep 2022 07:01  -  15 Sep 2022 07:00  --------------------------------------------------------  IN: 860 mL / OUT: 2600 mL / NET: -1740 mL    15 Sep 2022 07:01  -  15 Sep 2022 15:26  --------------------------------------------------------  IN: 480 mL / OUT: 625 mL / NET: -145 mL        Vital Signs Last 24 Hrs  T(C): 35.6 (15 Sep 2022 12:13), Max: 37.1 (15 Sep 2022 08:54)  T(F): 96 (15 Sep 2022 12:13), Max: 98.7 (15 Sep 2022 08:54)  HR: 73 (15 Sep 2022 12:13) (71 - 91)  BP: 124/76 (15 Sep 2022 12:13) (113/72 - 137/84)  BP(mean): --  RR: 18 (15 Sep 2022 12:13) (18 - 18)  SpO2: 94% (15 Sep 2022 12:13) (94% - 98%)    PHYSICAL EXAM:  GENERAL: NAD  EYES: EOMI, PERRLA,   NECK: Supple, No JVD  CHEST/LUNG: Clear   HEART: S1S2; no murmurs  ABDOMEN: Soft, Nontender  EXTREMITIES:  no edema  NEUROLOGY: alert  + numbness soles and legs    LABS:                        13.0   15.08 )-----------( 318      ( 15 Sep 2022 07:15 )             39.3     09-15    136  |  100  |  17  ----------------------------<  165<H>  4.2   |  26  |  0.64    Ca    8.8      15 Sep 2022 07:17                  All consultant(s) notes reviewed and care discussed with other providers        Contact Number, Dr Tong [phone]

## 2022-09-15 NOTE — DISCHARGE NOTE PROVIDER - NSDCACTIVITY_GEN_ALL_CORE
98 Do not drive or operate machinery/Showering allowed/Stairs allowed/Walking - Indoors allowed/No heavy lifting/straining/Walking - Outdoors allowed

## 2022-09-15 NOTE — DISCHARGE NOTE PROVIDER - NSDCCPCAREPLAN_GEN_ALL_CORE_FT
PRINCIPAL DISCHARGE DIAGNOSIS  Diagnosis: Lumbar disc herniation  Assessment and Plan of Treatment: s/p L5-S1 laminectomy and discectomy 9/13/22.  Keep Incision Clean and Dry. May shower & get incision wet 9/18/22 . pat dry after. no creams or lotions on incision. No immersion baths..  steri strips to fall off by itself   No strenous activity. No heavy lifting. Do not return to work until cleared by physician. No driving until cleared by physician.  No bending back. No heavy lifting. No twisting back..        SECONDARY DISCHARGE DIAGNOSES  Diagnosis: Chronic constipation  Assessment and Plan of Treatment: Continue Linzess daily. Miralax daily until regular bowel movement  Anusol suppository  for hemorrhoids     PRINCIPAL DISCHARGE DIAGNOSIS  Diagnosis: Lumbar disc herniation  Assessment and Plan of Treatment: s/p L5-S1 laminectomy and discectomy 9/13/22.  Keep Incision Clean and Dry. May shower & get incision wet 9/18/22 . pat dry after. no creams or lotions on incision. No immersion baths..  Incision evaluation and staple removal .  follow up on 9/23/22 10 am at Dr Louis office   No strenous activity. No heavy lifting. Do not return to work until cleared by physician. No driving until cleared by physician.  No bending back. No heavy lifting. No twisting back..        SECONDARY DISCHARGE DIAGNOSES  Diagnosis: Chronic constipation  Assessment and Plan of Treatment: Continue Linzess daily. Miralax daily until regular bowel movement  Anusol suppository  for hemorrhoids

## 2022-09-15 NOTE — PROGRESS NOTE ADULT - SUBJECTIVE AND OBJECTIVE BOX
INTERVAL HPI/OVERNIGHT EVENTS:  asked to follow up 2/2 hemorrhoidal discomfort and no BM post-op  tolerating PO  ++flatus    multiple BMs pre-op with Moviprep  some hemorrhoidal discomfort, no bleeding  no abdominal pain  dc planning in progress  s/p lumbar laminectomy and discectomy 9/13/22- some incisional pain and b/l LE numbness    MEDICATIONS  (STANDING):  dexAMETHasone     Tablet 4 milliGRAM(s) Oral every 8 hours  enoxaparin Injectable 40 milliGRAM(s) SubCutaneous every 24 hours  hydrocortisone hemorrhoidal Suppository 1 Suppository(s) Rectal two times a day  linaclotide 145 MICROGram(s) Oral before breakfast  methocarbamol 500 milliGRAM(s) Oral three times a day  pantoprazole    Tablet 40 milliGRAM(s) Oral daily  polyethylene glycol 3350 17 Gram(s) Oral daily  senna 2 Tablet(s) Oral at bedtime    MEDICATIONS  (PRN):  melatonin 5 milliGRAM(s) Oral at bedtime PRN Sleep  oxyCODONE    IR 5 milliGRAM(s) Oral every 4 hours PRN Moderate Pain (4 - 6)  oxyCODONE    IR 10 milliGRAM(s) Oral every 4 hours PRN Severe Pain (7 - 10)      Allergies  No Known Allergies      Review of Systems:    General:  No wt loss, fevers, chills, night sweats, fatigue,   CV:  No pain, palpitatioins, hypo/hypertension  Resp:  No dyspnea, cough, tachypnea, wheezing  GI:  see HPI  :  No pain, bleeding, incontinence, nocturia  Muscle:  No pain, weakness  Neuro:  see HPI  Psych:  No fatigue, insomnia, mood problems, depression  Endocrine:  No polyuria, polydypsia, cold/heat intolerance  Heme:  No petechiae, ecchymosis, easy bruisability  Skin:  No rash, tattoos, scars, edema    Vital Signs Last 24 Hrs  T(C): 35.6 (15 Sep 2022 12:13), Max: 37.1 (15 Sep 2022 08:54)  T(F): 96 (15 Sep 2022 12:13), Max: 98.7 (15 Sep 2022 08:54)  HR: 73 (15 Sep 2022 12:13) (71 - 91)  BP: 124/76 (15 Sep 2022 12:13) (113/72 - 137/84)  BP(mean): --  RR: 18 (15 Sep 2022 12:13) (18 - 18)  SpO2: 94% (15 Sep 2022 12:13) (94% - 98%)    Parameters below as of 15 Sep 2022 12:13  Patient On (Oxygen Delivery Method): room air    PHYSICAL EXAM:  GENERAL:  Lying in bed in NAD on left side  HEENT:  NCAT, no scleral icterus  NECK: Trachea midline  CHEST:  Resp even, non labored  ABDOMEN:  Soft, non-tender, non-distended   RECTAL: edematous non thrombosed non bleeding hemorrhoids  EXTREMITIES:  WWP, no edema  SKIN:  No visible rash or jaundice  NEURO:  Alert and oriented x 3      LABS:                        13.0   15.08 )-----------( 318      ( 15 Sep 2022 07:15 )             39.3     09-15    136  |  100  |  17  ----------------------------<  165<H>  4.2   |  26  |  0.64    Ca    8.8      15 Sep 2022 07:17              RADIOLOGY & ADDITIONAL TESTS:

## 2022-09-16 ENCOUNTER — TRANSCRIPTION ENCOUNTER (OUTPATIENT)
Age: 37
End: 2022-09-16

## 2022-09-18 ENCOUNTER — INPATIENT (INPATIENT)
Facility: HOSPITAL | Age: 37
LOS: 4 days | Discharge: HOME HEALTH | DRG: 920 | End: 2022-09-23
Attending: INTERNAL MEDICINE | Admitting: INTERNAL MEDICINE
Payer: COMMERCIAL

## 2022-09-18 VITALS
RESPIRATION RATE: 20 BRPM | TEMPERATURE: 99 F | SYSTOLIC BLOOD PRESSURE: 120 MMHG | HEIGHT: 66 IN | WEIGHT: 225.09 LBS | OXYGEN SATURATION: 95 % | DIASTOLIC BLOOD PRESSURE: 81 MMHG | HEART RATE: 112 BPM

## 2022-09-18 DIAGNOSIS — M54.50 LOW BACK PAIN, UNSPECIFIED: ICD-10-CM

## 2022-09-18 DIAGNOSIS — M54.9 DORSALGIA, UNSPECIFIED: ICD-10-CM

## 2022-09-18 DIAGNOSIS — Z98.890 OTHER SPECIFIED POSTPROCEDURAL STATES: Chronic | ICD-10-CM

## 2022-09-18 LAB
ALBUMIN SERPL ELPH-MCNC: 3.1 G/DL — LOW (ref 3.3–5)
ALP SERPL-CCNC: 49 U/L — SIGNIFICANT CHANGE UP (ref 40–120)
ALT FLD-CCNC: 22 U/L — SIGNIFICANT CHANGE UP (ref 10–45)
ANION GAP SERPL CALC-SCNC: 11 MMOL/L — SIGNIFICANT CHANGE UP (ref 5–17)
APPEARANCE UR: ABNORMAL
AST SERPL-CCNC: 24 U/L — SIGNIFICANT CHANGE UP (ref 10–40)
BACTERIA # UR AUTO: NEGATIVE — SIGNIFICANT CHANGE UP
BILIRUB SERPL-MCNC: 0.2 MG/DL — SIGNIFICANT CHANGE UP (ref 0.2–1.2)
BILIRUB UR-MCNC: NEGATIVE — SIGNIFICANT CHANGE UP
BUN SERPL-MCNC: 21 MG/DL — SIGNIFICANT CHANGE UP (ref 7–23)
CALCIUM SERPL-MCNC: 7.5 MG/DL — LOW (ref 8.4–10.5)
CHLORIDE SERPL-SCNC: 106 MMOL/L — SIGNIFICANT CHANGE UP (ref 96–108)
CO2 SERPL-SCNC: 21 MMOL/L — LOW (ref 22–31)
COLOR SPEC: SIGNIFICANT CHANGE UP
CREAT SERPL-MCNC: 0.53 MG/DL — SIGNIFICANT CHANGE UP (ref 0.5–1.3)
DIFF PNL FLD: ABNORMAL
EGFR: 124 ML/MIN/1.73M2 — SIGNIFICANT CHANGE UP
EPI CELLS # UR: 1 /HPF — SIGNIFICANT CHANGE UP
GLUCOSE SERPL-MCNC: 120 MG/DL — HIGH (ref 70–99)
GLUCOSE UR QL: NEGATIVE — SIGNIFICANT CHANGE UP
HCT VFR BLD CALC: 31.4 % — LOW (ref 34.5–45)
HGB BLD-MCNC: 10.3 G/DL — LOW (ref 11.5–15.5)
HYALINE CASTS # UR AUTO: 2 /LPF — SIGNIFICANT CHANGE UP (ref 0–2)
KETONES UR-MCNC: NEGATIVE — SIGNIFICANT CHANGE UP
LEUKOCYTE ESTERASE UR-ACNC: NEGATIVE — SIGNIFICANT CHANGE UP
MCHC RBC-ENTMCNC: 28.7 PG — SIGNIFICANT CHANGE UP (ref 27–34)
MCHC RBC-ENTMCNC: 32.8 GM/DL — SIGNIFICANT CHANGE UP (ref 32–36)
MCV RBC AUTO: 87.5 FL — SIGNIFICANT CHANGE UP (ref 80–100)
NITRITE UR-MCNC: NEGATIVE — SIGNIFICANT CHANGE UP
NRBC # BLD: 0 /100 WBCS — SIGNIFICANT CHANGE UP (ref 0–0)
PH UR: 6.5 — SIGNIFICANT CHANGE UP (ref 5–8)
PLATELET # BLD AUTO: 254 K/UL — SIGNIFICANT CHANGE UP (ref 150–400)
POTASSIUM SERPL-MCNC: 3 MMOL/L — LOW (ref 3.5–5.3)
POTASSIUM SERPL-SCNC: 3 MMOL/L — LOW (ref 3.5–5.3)
PROT SERPL-MCNC: 5.4 G/DL — LOW (ref 6–8.3)
PROT UR-MCNC: ABNORMAL
RBC # BLD: 3.59 M/UL — LOW (ref 3.8–5.2)
RBC # FLD: 12.6 % — SIGNIFICANT CHANGE UP (ref 10.3–14.5)
RBC CASTS # UR COMP ASSIST: 2 /HPF — SIGNIFICANT CHANGE UP (ref 0–4)
SARS-COV-2 RNA SPEC QL NAA+PROBE: SIGNIFICANT CHANGE UP
SODIUM SERPL-SCNC: 138 MMOL/L — SIGNIFICANT CHANGE UP (ref 135–145)
SP GR SPEC: 1.01 — SIGNIFICANT CHANGE UP (ref 1.01–1.02)
UROBILINOGEN FLD QL: NEGATIVE — SIGNIFICANT CHANGE UP
WBC # BLD: 15.45 K/UL — HIGH (ref 3.8–10.5)
WBC # FLD AUTO: 15.45 K/UL — HIGH (ref 3.8–10.5)
WBC UR QL: 3 /HPF — SIGNIFICANT CHANGE UP (ref 0–5)

## 2022-09-18 PROCEDURE — 71045 X-RAY EXAM CHEST 1 VIEW: CPT | Mod: 26

## 2022-09-18 PROCEDURE — G1004: CPT

## 2022-09-18 PROCEDURE — 99285 EMERGENCY DEPT VISIT HI MDM: CPT

## 2022-09-18 PROCEDURE — 72131 CT LUMBAR SPINE W/O DYE: CPT | Mod: 26,ME

## 2022-09-18 RX ORDER — ACETAMINOPHEN 500 MG
650 TABLET ORAL ONCE
Refills: 0 | Status: COMPLETED | OUTPATIENT
Start: 2022-09-18 | End: 2022-09-18

## 2022-09-18 RX ORDER — POTASSIUM CHLORIDE 20 MEQ
40 PACKET (EA) ORAL DAILY
Refills: 0 | Status: DISCONTINUED | OUTPATIENT
Start: 2022-09-18 | End: 2022-09-23

## 2022-09-18 RX ORDER — LINACLOTIDE 145 UG/1
1 CAPSULE, GELATIN COATED ORAL
Qty: 0 | Refills: 0 | DISCHARGE

## 2022-09-18 RX ORDER — SODIUM CHLORIDE 9 MG/ML
1000 INJECTION INTRAMUSCULAR; INTRAVENOUS; SUBCUTANEOUS ONCE
Refills: 0 | Status: COMPLETED | OUTPATIENT
Start: 2022-09-18 | End: 2022-09-18

## 2022-09-18 RX ORDER — POTASSIUM CHLORIDE 20 MEQ
30 PACKET (EA) ORAL ONCE
Refills: 0 | Status: DISCONTINUED | OUTPATIENT
Start: 2022-09-18 | End: 2022-09-18

## 2022-09-18 RX ORDER — OXYCODONE HYDROCHLORIDE 5 MG/1
10 TABLET ORAL EVERY 4 HOURS
Refills: 0 | Status: DISCONTINUED | OUTPATIENT
Start: 2022-09-18 | End: 2022-09-19

## 2022-09-18 RX ORDER — PANTOPRAZOLE SODIUM 20 MG/1
40 TABLET, DELAYED RELEASE ORAL
Refills: 0 | Status: DISCONTINUED | OUTPATIENT
Start: 2022-09-18 | End: 2022-09-23

## 2022-09-18 RX ORDER — ALPRAZOLAM 0.25 MG
1 TABLET ORAL
Qty: 0 | Refills: 0 | DISCHARGE

## 2022-09-18 RX ORDER — DIAZEPAM 5 MG
5 TABLET ORAL ONCE
Refills: 0 | Status: DISCONTINUED | OUTPATIENT
Start: 2022-09-18 | End: 2022-09-18

## 2022-09-18 RX ORDER — SENNA PLUS 8.6 MG/1
2 TABLET ORAL AT BEDTIME
Refills: 0 | Status: DISCONTINUED | OUTPATIENT
Start: 2022-09-18 | End: 2022-09-23

## 2022-09-18 RX ORDER — HYDROMORPHONE HYDROCHLORIDE 2 MG/ML
1 INJECTION INTRAMUSCULAR; INTRAVENOUS; SUBCUTANEOUS ONCE
Refills: 0 | Status: DISCONTINUED | OUTPATIENT
Start: 2022-09-18 | End: 2022-09-18

## 2022-09-18 RX ORDER — DEXAMETHASONE 0.5 MG/5ML
4 ELIXIR ORAL EVERY 12 HOURS
Refills: 0 | Status: DISCONTINUED | OUTPATIENT
Start: 2022-09-18 | End: 2022-09-23

## 2022-09-18 RX ORDER — KETOROLAC TROMETHAMINE 30 MG/ML
30 SYRINGE (ML) INJECTION ONCE
Refills: 0 | Status: DISCONTINUED | OUTPATIENT
Start: 2022-09-18 | End: 2022-09-18

## 2022-09-18 RX ORDER — CYCLOBENZAPRINE HYDROCHLORIDE 10 MG/1
10 TABLET, FILM COATED ORAL THREE TIMES A DAY
Refills: 0 | Status: DISCONTINUED | OUTPATIENT
Start: 2022-09-18 | End: 2022-09-23

## 2022-09-18 RX ORDER — POLYETHYLENE GLYCOL 3350 17 G/17G
17 POWDER, FOR SOLUTION ORAL
Refills: 0 | Status: DISCONTINUED | OUTPATIENT
Start: 2022-09-18 | End: 2022-09-23

## 2022-09-18 RX ORDER — ENOXAPARIN SODIUM 100 MG/ML
40 INJECTION SUBCUTANEOUS EVERY 24 HOURS
Refills: 0 | Status: DISCONTINUED | OUTPATIENT
Start: 2022-09-18 | End: 2022-09-23

## 2022-09-18 RX ADMIN — Medication 40 MILLIEQUIVALENT(S): at 08:24

## 2022-09-18 RX ADMIN — SENNA PLUS 2 TABLET(S): 8.6 TABLET ORAL at 22:23

## 2022-09-18 RX ADMIN — HYDROMORPHONE HYDROCHLORIDE 1 MILLIGRAM(S): 2 INJECTION INTRAMUSCULAR; INTRAVENOUS; SUBCUTANEOUS at 08:10

## 2022-09-18 RX ADMIN — Medication 30 MILLIGRAM(S): at 08:10

## 2022-09-18 RX ADMIN — SODIUM CHLORIDE 1000 MILLILITER(S): 9 INJECTION INTRAMUSCULAR; INTRAVENOUS; SUBCUTANEOUS at 04:47

## 2022-09-18 RX ADMIN — OXYCODONE HYDROCHLORIDE 10 MILLIGRAM(S): 5 TABLET ORAL at 21:34

## 2022-09-18 RX ADMIN — HYDROMORPHONE HYDROCHLORIDE 1 MILLIGRAM(S): 2 INJECTION INTRAMUSCULAR; INTRAVENOUS; SUBCUTANEOUS at 04:37

## 2022-09-18 RX ADMIN — Medication 30 MILLIGRAM(S): at 16:46

## 2022-09-18 RX ADMIN — Medication 5 MILLIGRAM(S): at 04:35

## 2022-09-18 RX ADMIN — ENOXAPARIN SODIUM 40 MILLIGRAM(S): 100 INJECTION SUBCUTANEOUS at 22:23

## 2022-09-18 RX ADMIN — Medication 30 MILLIGRAM(S): at 15:00

## 2022-09-18 RX ADMIN — OXYCODONE HYDROCHLORIDE 10 MILLIGRAM(S): 5 TABLET ORAL at 20:49

## 2022-09-18 RX ADMIN — CYCLOBENZAPRINE HYDROCHLORIDE 10 MILLIGRAM(S): 10 TABLET, FILM COATED ORAL at 23:44

## 2022-09-18 RX ADMIN — Medication 4 MILLIGRAM(S): at 20:09

## 2022-09-18 RX ADMIN — Medication 650 MILLIGRAM(S): at 22:23

## 2022-09-18 RX ADMIN — SODIUM CHLORIDE 1000 MILLILITER(S): 9 INJECTION INTRAMUSCULAR; INTRAVENOUS; SUBCUTANEOUS at 09:52

## 2022-09-18 RX ADMIN — Medication 30 MILLIGRAM(S): at 06:48

## 2022-09-18 NOTE — H&P ADULT - ASSESSMENT
37y Female with PMHx recent L5-S1 laminectomy (9/13), presenting with back pain since 3PM. Patient was standing up from a chair, when felt sudden onset of sharp stabbing pain in the lower back. Patient has been endorsing numbness and tingling in bilateral lower extremities since the surgery. Prior to the pain, patient was able to ambulating with little issue. Patient unable to move 2/2 pain. Patient took her oxycodone 10 and received 100 mg fentanyl in the ambulance to little relief. Patient denies fever, CP, SOB, abdominal pain, and urinary changes.    recent L5 lami L5/S1 with back pain  - pt was seen by NS  - pain control with oxy  - muscle relaxants   - MRI of spine with and without contrast  - cont decadron  - PT eval    constipation  - no bm for 5 days  - senna and miralax    dvt px  - lovenox

## 2022-09-18 NOTE — ED ADULT NURSE REASSESSMENT NOTE - NS ED NURSE REASSESS COMMENT FT1
Pt c/o 10/10 back pain at this time. NP Ana made aware ([id number]), awaiting for orders
Pt placed on purewick connected to continuous suction. Call light within reach. No apparent distress. Awaiting for MRI
pharmacy called for 4mg PO decadron
Pt is A&Ox4, GCS15. CT resulted. Pt awaiting neurosurgery consult. Pt repositioned in the bed, c/o back pain. Appears drowsy and irritable. Family at bedside.

## 2022-09-18 NOTE — ED PROVIDER NOTE - ATTENDING CONTRIBUTION TO CARE
37y Female with PMHx recent L5-S1 laminectomy (9/13), presenting with back pain since 3PM after standing with worsening pain now can not even stand or move due to pain, took her pain meds, given fentanyl 100mcg pta here still in pain. given pain meds, ns consulted due to recent surgery, ct lspine ordered, labs, numbness to legs has been prior to the surgery, no motor deficits or fever. no bowel or bladder changes.

## 2022-09-18 NOTE — ED ADULT NURSE NOTE - NSIMPLEMENTINTERV_GEN_ALL_ED
Implemented All Fall Risk Interventions:  [place] to call system. Call bell, personal items and telephone within reach. Instruct patient to call for assistance. Room bathroom lighting operational. Non-slip footwear when patient is off stretcher. Physically safe environment: no spills, clutter or unnecessary equipment. Stretcher in lowest position, wheels locked, appropriate side rails in place. Provide visual cue, wrist band, yellow gown, etc. Monitor gait and stability. Monitor for mental status changes and reorient to person, place, and time. Review medications for side effects contributing to fall risk. Reinforce activity limits and safety measures with patient and family.

## 2022-09-18 NOTE — ED PROVIDER NOTE - CLINICAL SUMMARY MEDICAL DECISION MAKING FREE TEXT BOX
37y Female with PMHx recent L5-S1 laminectomy (9/13), presenting with back pain since 3PM. Will get basic labs and consult NSGY.

## 2022-09-18 NOTE — H&P ADULT - HISTORY OF PRESENT ILLNESS
37y Female with PMHx recent L5-S1 laminectomy (9/13), presenting with back pain since 3PM. Patient was standing up from a chair, when felt sudden onset of sharp stabbing pain in the lower back. Patient has been endorsing numbness and tingling in bilateral lower extremities since the surgery. Prior to the pain, patient was able to ambulating with little issue. Patient unable to move 2/2 pain. Patient took her oxycodone 10 and received 100 mg fentanyl in the ambulance to little relief. Patient denies fever, CP, SOB, abdominal pain, and urinary changes

## 2022-09-18 NOTE — CONSULT NOTE ADULT - SUBJECTIVE AND OBJECTIVE BOX
p ([id number])     HPI: 37F PMH recent L5 lami L5/S1 disc discharged 9/15 p/w LBP since 3PM yesterday. No radicular component, pain paraspinal and pulsing in nature. No bowel/urine incontinence. Reports that paresthesias/numbness are stable from last admission. CT L spine showed showed post-op pneumo. WBC still elevated 15.45, afebrile.  Exam: AOx3, b/l UE 5/5 LLE 5/5 RLE hf 2 ke 4- kf 4- df 5 pf 5 (pain limited), numbness/paresthesias to L5, S1, and S2 distribution, patient would not turn to inspect incision 2/2 pain    --Anticoagulation: denied    =====================  PAST MEDICAL HISTORY   Lumbar herniated disc      PAST SURGICAL HISTORY   No significant past surgical history    S/P laminectomy          MEDICATIONS:  Antibiotics:    Neuro:    Other:  potassium chloride    Tablet ER 40 milliEquivalent(s) Oral daily      SOCIAL HISTORY:   Occupation:   Marital Status:     FAMILY HISTORY:      ROS: Negative except per HPI    LABS:                          10.3   15.45 )-----------( 254      ( 18 Sep 2022 05:24 )             31.4     09-18    138  |  106  |  21  ----------------------------<  120<H>  3.0<L>   |  21<L>  |  0.53    Ca    7.5<L>      18 Sep 2022 05:24    TPro  5.4<L>  /  Alb  3.1<L>  /  TBili  0.2  /  DBili  x   /  AST  24  /  ALT  22  /  AlkPhos  49  09-18

## 2022-09-18 NOTE — ED ADULT NURSE REASSESSMENT NOTE - NEURO BEHAVIOR
PT Wound Care Progress Note        SUBJECTIVE: patient tearful when seen this morning about her long term diagnosis of MS.    No chief complaint on file.       OBJECTIVE:  Pain assessment: no c/o pain with tubigrip application.       Treatment:     06/26/20 1300   Treatment   Wound Location BLEs   Treatment Type Compression   Compression   Laterality Bilateral   Compression Applied to Ankle to tibial tubercle;Toe bases to proximal ankle   Compression Used Tubular compression bandage size F   Goals   Goals-Short Term (4-6 weeks) Decrease edema by 2 cm at greater than or equal to 1 area measured       ASSESSMENT  Patient received sitting up in chair without tubigrip on her LEs. Noted that she continues to have pitting edema however visually limb volume appears reduced from 2 days ago.  Applied patient's tubigrip and instructed her to make sure that it is applied first thing in the morning and can still be removed at night when she goes to bed.  Patient will benefit from compression to reduce BLE edema and promote improved functional mobility.     Plan  [facility] to check tubigrip again on 6/27/20 and modify tubigrip size if indicated.     Jazmín Joyce, PT  6/26/2020   drowsy/irritable

## 2022-09-18 NOTE — CONSULT NOTE ADULT - ASSESSMENT
37F PMH recent L5 lami L5/S1 disc discharged 9/15 p/w LBP since 3PM yesterday. No radicular component, pain paraspinal and pulsing in nature. No bowel/urine incontinence. Reports that paresthesias/numbness are stable from last admission. CT L spine showed showed post-op pneumo. WBC still elevated 15.45, afebrile.  Exam: AOx3, b/l UE 5/5 LLE 5/5 RLE hf 2 ke 4- kf 4- df 5 pf 5 (pain limited), numbness/paresthesias to L5, S1, and S2 distribution, patient would not turn to inspect incision 2/2 pain  -MR L spine ww/o to r/o infection  -Pain likely MSK in origin, would defer to ED/primary for management 37F PMH recent L5 lami L5/S1 disc discharged 9/15 p/w LBP since 3PM yesterday. No radicular component, pain paraspinal and pulsing in nature. No bowel/urine incontinence. Reports that paresthesias/numbness are stable from last admission. CT L spine showed showed post-op pneumo. WBC still elevated 15.45, afebrile.  Exam: AOx3, b/l UE 5/5 LLE 5/5 RLE hf 2 ke 4- kf 4- df 5 pf 5 (pain limited), numbness/paresthesias to L5, S1, and S2 distribution, patient would not turn to inspect incision 2/2 pain  -MR L spine ww/o   -Pain likely MSK in origin, would defer to ED/primary for management

## 2022-09-18 NOTE — H&P ADULT - NEGATIVE NEUROLOGICAL SYMPTOMS
no transient paralysis/no generalized seizures/no focal seizures/no syncope/no tremors/no vertigo/no loss of sensation

## 2022-09-18 NOTE — ED PROVIDER NOTE - PROGRESS NOTE DETAILS
Vidal OSBORNE: patient signed out by previous team. neurosurgery consult appreciated. patient to be admitted to medicine for MRI and further eval. discussed with ER attending.

## 2022-09-18 NOTE — ED PROVIDER NOTE - NS ED ROS FT
CONST: no fevers, no chills, no trauma  EYES: no blurry vision   ENT: no sore throat  CV: no chest pain no extremity swelling  RESP: no shortness of breath  ABD: no abdominal pain, no nausea, no vomiting, no diarrhea, no melena  : no dysuria, no hematuria, no frequency  MSK: (+) back pain, no neck pain, no extremity pain  NEURO: no headache, no focal weakness, no dizziness  HEME: no bruising  SKIN: no rash

## 2022-09-18 NOTE — ED ADULT NURSE NOTE - OBJECTIVE STATEMENT
36yo F with PMH recent L5-S1 laminectomy (9/13) presents ot ED via EMS c/o back pain. Pt states that the pain started at 3pm, she was standing up from chair when she felt a sudden onset of sharp stabbing pain in her lower back. Pt endorsing numbness and tingling in bilateral lower extremities since the surgery. Prior to the pain, patient was able to ambulating with little issue. Patient unable to move at this time because of pain. Pt endorsing 10/10 back pain, took her oxycodone 10 at home prior to EMS arrival and received 100 mcg of fentanyl by EMS with no relief at this time. EMS placed 20G IV in R hand.  Denies chest pain, SOB, N/V, blood in stool, hematuria, dysuria, urinary frequency, fevers/chills. A&Ox3. Strong peripheral pulses. Neurologically intact and follows commands. Difficulty positioning patient in position of comfort. Skin warm dry intact and normal for ethnicity. Stretcher locked and in lowest position, appropriate side rails up. Pt instructed to notify RN if assistance is needed.

## 2022-09-18 NOTE — ED PROVIDER NOTE - PHYSICAL EXAMINATION
Const: not in acute distress  Eyes: no conjunctival injection  HEENT: Head NCAT, Moist MM.  Neck: Trachea midline.   CVS: +S1/S2, Peripheral pulses 2+ and equal in all extremities.  RESP: Unlabored respiratory effort. Clear to auscultation bilaterally.  GI: Nontender/Nondistended, No CVA tenderness b/l.   MSK: Normocephalic/Atraumatic, No Lower Extremities edema b/l.   Skin: Intact.   Neuro: limited 2/2 extreme pain. Moving upper extremities, but unable to move lower 2/2 pain  Psych: Awake, Alert, & Oriented (AAO) x3.

## 2022-09-18 NOTE — ED PROVIDER NOTE - OBJECTIVE STATEMENT
37y Female with PMHx recent L5-S1 laminectomy (9/13), presenting with back pain since 3PM. Patient was standing up from a chair, when felt sudden onset of sharp stabbing pain in the lower back. Patient has been endorsing numbness and tingling in bilateral lower extremities since the surgery. Prior to the pain, patient was able to ambulating with little issue. Patient unable to move 2/2 pain. Patient took her oxycodone 10 and received 100 mg fentanyl in the ambulance to little relief. Patient denies fever, CP, SOB, abdominal pain, and urinary changes.

## 2022-09-18 NOTE — H&P ADULT - NSHPLABSRESULTS_GEN_ALL_CORE
LABS:                        10.3   15.45 )-----------( 254      ( 18 Sep 2022 05:24 )             31.4     09-18    138  |  106  |  21  ----------------------------<  120<H>  3.0<L>   |  21<L>  |  0.53    Ca    7.5<L>      18 Sep 2022 05:24    TPro  5.4<L>  /  Alb  3.1<L>  /  TBili  0.2  /  DBili  x   /  AST  24  /  ALT  22  /  AlkPhos  49  09-18              RADIOLOGY & ADDITIONAL TESTS:

## 2022-09-19 ENCOUNTER — TRANSCRIPTION ENCOUNTER (OUTPATIENT)
Age: 37
End: 2022-09-19

## 2022-09-19 LAB
ANION GAP SERPL CALC-SCNC: 13 MMOL/L — SIGNIFICANT CHANGE UP (ref 5–17)
BUN SERPL-MCNC: 18 MG/DL — SIGNIFICANT CHANGE UP (ref 7–23)
CALCIUM SERPL-MCNC: 9.2 MG/DL — SIGNIFICANT CHANGE UP (ref 8.4–10.5)
CHLORIDE SERPL-SCNC: 98 MMOL/L — SIGNIFICANT CHANGE UP (ref 96–108)
CO2 SERPL-SCNC: 25 MMOL/L — SIGNIFICANT CHANGE UP (ref 22–31)
CREAT SERPL-MCNC: 0.71 MG/DL — SIGNIFICANT CHANGE UP (ref 0.5–1.3)
EGFR: 114 ML/MIN/1.73M2 — SIGNIFICANT CHANGE UP
FOLATE SERPL-MCNC: 8.8 NG/ML — SIGNIFICANT CHANGE UP
GLUCOSE SERPL-MCNC: 121 MG/DL — HIGH (ref 70–99)
GRAM STN FLD: SIGNIFICANT CHANGE UP
HCT VFR BLD CALC: 39 % — SIGNIFICANT CHANGE UP (ref 34.5–45)
HGB BLD-MCNC: 12.7 G/DL — SIGNIFICANT CHANGE UP (ref 11.5–15.5)
LACTATE SERPL-SCNC: 2.1 MMOL/L — HIGH (ref 0.5–2)
MAGNESIUM SERPL-MCNC: 2.4 MG/DL — SIGNIFICANT CHANGE UP (ref 1.6–2.6)
MCHC RBC-ENTMCNC: 28.4 PG — SIGNIFICANT CHANGE UP (ref 27–34)
MCHC RBC-ENTMCNC: 32.6 GM/DL — SIGNIFICANT CHANGE UP (ref 32–36)
MCV RBC AUTO: 87.2 FL — SIGNIFICANT CHANGE UP (ref 80–100)
NRBC # BLD: 0 /100 WBCS — SIGNIFICANT CHANGE UP (ref 0–0)
PHOSPHATE SERPL-MCNC: 3.6 MG/DL — SIGNIFICANT CHANGE UP (ref 2.5–4.5)
PLATELET # BLD AUTO: 317 K/UL — SIGNIFICANT CHANGE UP (ref 150–400)
POTASSIUM SERPL-MCNC: 4.3 MMOL/L — SIGNIFICANT CHANGE UP (ref 3.5–5.3)
POTASSIUM SERPL-SCNC: 4.3 MMOL/L — SIGNIFICANT CHANGE UP (ref 3.5–5.3)
PROCALCITONIN SERPL-MCNC: 0.1 NG/ML — SIGNIFICANT CHANGE UP (ref 0.02–0.1)
RBC # BLD: 4.47 M/UL — SIGNIFICANT CHANGE UP (ref 3.8–5.2)
RBC # FLD: 12.6 % — SIGNIFICANT CHANGE UP (ref 10.3–14.5)
SODIUM SERPL-SCNC: 136 MMOL/L — SIGNIFICANT CHANGE UP (ref 135–145)
SPECIMEN SOURCE: SIGNIFICANT CHANGE UP
TSH SERPL-MCNC: 0.3 UIU/ML — SIGNIFICANT CHANGE UP (ref 0.27–4.2)
VIT B12 SERPL-MCNC: 545 PG/ML — SIGNIFICANT CHANGE UP (ref 232–1245)
WBC # BLD: 14.64 K/UL — HIGH (ref 3.8–10.5)
WBC # FLD AUTO: 14.64 K/UL — HIGH (ref 3.8–10.5)

## 2022-09-19 PROCEDURE — 72158 MRI LUMBAR SPINE W/O & W/DYE: CPT | Mod: 26

## 2022-09-19 PROCEDURE — 93970 EXTREMITY STUDY: CPT | Mod: 26

## 2022-09-19 PROCEDURE — 99223 1ST HOSP IP/OBS HIGH 75: CPT

## 2022-09-19 RX ORDER — VANCOMYCIN HCL 1 G
VIAL (EA) INTRAVENOUS
Refills: 0 | Status: DISCONTINUED | OUTPATIENT
Start: 2022-09-19 | End: 2022-09-21

## 2022-09-19 RX ORDER — ACETAMINOPHEN 500 MG
650 TABLET ORAL EVERY 6 HOURS
Refills: 0 | Status: DISCONTINUED | OUTPATIENT
Start: 2022-09-19 | End: 2022-09-23

## 2022-09-19 RX ORDER — HYDROMORPHONE HYDROCHLORIDE 2 MG/ML
1 INJECTION INTRAMUSCULAR; INTRAVENOUS; SUBCUTANEOUS ONCE
Refills: 0 | Status: DISCONTINUED | OUTPATIENT
Start: 2022-09-19 | End: 2022-09-19

## 2022-09-19 RX ORDER — ACETAMINOPHEN 500 MG
650 TABLET ORAL ONCE
Refills: 0 | Status: COMPLETED | OUTPATIENT
Start: 2022-09-19 | End: 2022-09-19

## 2022-09-19 RX ORDER — POTASSIUM CHLORIDE 20 MEQ
40 PACKET (EA) ORAL EVERY 4 HOURS
Refills: 0 | Status: DISCONTINUED | OUTPATIENT
Start: 2022-09-19 | End: 2022-09-19

## 2022-09-19 RX ORDER — VANCOMYCIN HCL 1 G
1250 VIAL (EA) INTRAVENOUS ONCE
Refills: 0 | Status: COMPLETED | OUTPATIENT
Start: 2022-09-19 | End: 2022-09-19

## 2022-09-19 RX ORDER — CEFEPIME 1 G/1
1000 INJECTION, POWDER, FOR SOLUTION INTRAMUSCULAR; INTRAVENOUS EVERY 12 HOURS
Refills: 0 | Status: DISCONTINUED | OUTPATIENT
Start: 2022-09-19 | End: 2022-09-20

## 2022-09-19 RX ORDER — OXYCODONE HYDROCHLORIDE 5 MG/1
5 TABLET ORAL EVERY 4 HOURS
Refills: 0 | Status: DISCONTINUED | OUTPATIENT
Start: 2022-09-19 | End: 2022-09-23

## 2022-09-19 RX ORDER — KETOROLAC TROMETHAMINE 30 MG/ML
30 SYRINGE (ML) INJECTION ONCE
Refills: 0 | Status: DISCONTINUED | OUTPATIENT
Start: 2022-09-19 | End: 2022-09-19

## 2022-09-19 RX ORDER — VANCOMYCIN HCL 1 G
1250 VIAL (EA) INTRAVENOUS EVERY 12 HOURS
Refills: 0 | Status: DISCONTINUED | OUTPATIENT
Start: 2022-09-20 | End: 2022-09-21

## 2022-09-19 RX ORDER — LACTULOSE 10 G/15ML
20 SOLUTION ORAL EVERY 4 HOURS
Refills: 0 | Status: COMPLETED | OUTPATIENT
Start: 2022-09-19 | End: 2022-09-19

## 2022-09-19 RX ORDER — ALPRAZOLAM 0.25 MG
0.5 TABLET ORAL ONCE
Refills: 0 | Status: DISCONTINUED | OUTPATIENT
Start: 2022-09-19 | End: 2022-09-19

## 2022-09-19 RX ORDER — KETOROLAC TROMETHAMINE 30 MG/ML
30 SYRINGE (ML) INJECTION EVERY 6 HOURS
Refills: 0 | Status: DISCONTINUED | OUTPATIENT
Start: 2022-09-19 | End: 2022-09-23

## 2022-09-19 RX ADMIN — Medication 40 MILLIEQUIVALENT(S): at 09:41

## 2022-09-19 RX ADMIN — LACTULOSE 20 GRAM(S): 10 SOLUTION ORAL at 17:12

## 2022-09-19 RX ADMIN — POLYETHYLENE GLYCOL 3350 17 GRAM(S): 17 POWDER, FOR SOLUTION ORAL at 17:13

## 2022-09-19 RX ADMIN — Medication 30 MILLIGRAM(S): at 17:55

## 2022-09-19 RX ADMIN — Medication 166.67 MILLIGRAM(S): at 19:05

## 2022-09-19 RX ADMIN — OXYCODONE HYDROCHLORIDE 10 MILLIGRAM(S): 5 TABLET ORAL at 05:47

## 2022-09-19 RX ADMIN — CYCLOBENZAPRINE HYDROCHLORIDE 10 MILLIGRAM(S): 10 TABLET, FILM COATED ORAL at 06:49

## 2022-09-19 RX ADMIN — Medication 30 MILLIGRAM(S): at 09:38

## 2022-09-19 RX ADMIN — POLYETHYLENE GLYCOL 3350 17 GRAM(S): 17 POWDER, FOR SOLUTION ORAL at 05:47

## 2022-09-19 RX ADMIN — HYDROMORPHONE HYDROCHLORIDE 1 MILLIGRAM(S): 2 INJECTION INTRAMUSCULAR; INTRAVENOUS; SUBCUTANEOUS at 13:24

## 2022-09-19 RX ADMIN — ENOXAPARIN SODIUM 40 MILLIGRAM(S): 100 INJECTION SUBCUTANEOUS at 22:09

## 2022-09-19 RX ADMIN — Medication 650 MILLIGRAM(S): at 06:48

## 2022-09-19 RX ADMIN — Medication 4 MILLIGRAM(S): at 05:46

## 2022-09-19 RX ADMIN — PANTOPRAZOLE SODIUM 40 MILLIGRAM(S): 20 TABLET, DELAYED RELEASE ORAL at 06:49

## 2022-09-19 RX ADMIN — Medication 40 MILLIEQUIVALENT(S): at 13:23

## 2022-09-19 RX ADMIN — Medication 650 MILLIGRAM(S): at 17:10

## 2022-09-19 RX ADMIN — Medication 4 MILLIGRAM(S): at 17:12

## 2022-09-19 RX ADMIN — Medication 0.5 MILLIGRAM(S): at 09:38

## 2022-09-19 RX ADMIN — LACTULOSE 20 GRAM(S): 10 SOLUTION ORAL at 13:23

## 2022-09-19 NOTE — CONSULT NOTE ADULT - SUBJECTIVE AND OBJECTIVE BOX
Patient is a 37y old  Female who presents with a chief complaint of lower back pain (19 Sep 2022 12:46)    HPI:  37y Female with PMHx recent L5-S1 laminectomy (9/13), readmitted 9.18 22 with  with severe back pain Patient was standing up from a chair, when felt sudden onset of sharp stabbing pain in the lower back. Patient has been endorsing numbness and tingling in bilateral lower extremities since the surgery. Prior to the pain, patient was able to ambulating with little issue. Patient unable to move 2/2 pain. Patient took her oxycodone 10 and received 100 mg fentanyl in the ambulance to little relief. Patient denies fever, CP, SOB, abdominal pain, and urinary changes (18 Sep 2022 17:26)  notes recent sweats  fever and leukocytosis noted on admission      PAST MEDICAL & SURGICAL HISTORY:  Lumbar herniated disc      S/P laminectomy  L5-S1 on 9/13/2022    Social history:  never smoker  - teacher - special ed      REVIEW OF SYSTEMS:  CONSTITUTIONAL: No weakness, fevers or chills  EYES/ENT: No visual changes;  No vertigo or throat pain   NECK: No pain or stiffness  RESPIRATORY: No cough, wheezing, hemoptysis; No shortness of breath  CARDIOVASCULAR: No chest pain or palpitations  GASTROINTESTINAL: No abdominal or epigastric pain. No nausea, vomiting, or hematemesis; No diarrhea or constipation. No melena or hematochezia.  GENITOURINARY: No dysuria, frequency or hematuria  NEUROLOGICAL: No numbness or weakness  SKIN: No itching, burning, rashes, or lesions   All other review of systems is negative unless indicated above    Allergies  No Known Allergies    Antimicrobials:      Vital Signs Last 24 Hrs  T(C): 36.8 (19 Sep 2022 15:35), Max: 38.6 (18 Sep 2022 21:41)  T(F): 98.3 (19 Sep 2022 15:35), Max: 101.4 (18 Sep 2022 21:41)  HR: 102 (19 Sep 2022 15:35) (61 - 125)  BP: 115/82 (19 Sep 2022 15:35) (111/68 - 133/88)  BP(mean): --  RR: 18 (19 Sep 2022 15:35) (18 - 18)  SpO2: 98% (19 Sep 2022 15:35) (95% - 100%)    Parameters below as of 19 Sep 2022 15:35  Patient On (Oxygen Delivery Method): room air    PHYSICAL EXAM:  General: WN/WD NAD, Non-toxic  Neurology: A&Ox3, nonfocal  Respiratory: Clear to auscultation bilaterally  CV: RRR, S1S2, no murmurs, rubs or gallops  Abdominal: Soft, Non-tender, non-distended, normal bowel sounds  Extremities: No edema, + peripheral pulses  Line Sites: Clear  Skin: No rash    clear drssing over lower back wound  no local tenderness                        12.7   14.64 )-----------( 317      ( 19 Sep 2022 15:39 )             39.0   WBC Count: 14.64 (09-19 @ 15:39)  WBC Count: 15.45 (09-18 @ 05:24)  WBC Count: 15.08 (09-15 @ 07:15)      09-19    136  |  98  |  18  ----------------------------<  121<H>  4.3   |  25  |  0.71    Ca    9.2      19 Sep 2022 16:48  Phos  3.6     09-19  Mg     2.4     09-19    TPro  5.4<L>  /  Alb  3.1<L>  /  TBili  0.2  /  DBili  x   /  AST  24  /  ALT  22  /  AlkPhos  49  09-18    MICROBIOLOGY:  (09.18.22 @ 05:24) COVID-19 PCR: NotDetec:      Radiology:  < from: [facility] Duplex Lower Ext Vein Scan, Bilat (09.19.22 @ 11:19) >  IMPRESSION:  No evidence of deep venous thrombosis in either lower extremity.    < end of copied text >  < from: MR Lumbar Spine w/wo IV Cont (09.19.22 @ 10:44) >  IMPRESSION:    Status post recent L5 total laminectomy.    Postsurgical changes emanating from the ventral epidural space occupies   approximately 70-75% of the spinal canal diameter. The presence of   superinfection (epidural phlegmon/abscess) is not entirely excluded.   Clinical correlation is recommended.    Enhancing soft tissue within the posterior aspect of the spinal canal at   the L5-S1 level likely representing granulation/scar tissue.    Postsurgical seroma at the level of the laminectomy defect measures   approximately 1.9 x 3.6 cm. Superinfection is not entirely excluded.    < end of copied text >  < from: CT Lumbar Spine No Cont (09.18.22 @ 05:41) >    IMPRESSION:  Status post L5 laminectomy with postsurgical changes as described above.    < end of copied text >      Yo Nam MD; Division of Infectious Disease; Pager: [phone]; nights and weekends: [phone]

## 2022-09-19 NOTE — PROGRESS NOTE ADULT - SUBJECTIVE AND OBJECTIVE BOX
Patient seen and examined at bedside.    --Anticoagulation--  enoxaparin Injectable 40 milliGRAM(s) SubCutaneous every 24 hours    T(C): 37.6 (09-19-22 @ 04:41), Max: 38.6 (09-18-22 @ 21:41)  HR: 100 (09-19-22 @ 04:41) (61 - 103)  BP: 123/82 (09-19-22 @ 04:41) (102/53 - 133/88)  RR: 18 (09-19-22 @ 04:41) (18 - 18)  SpO2: 98% (09-19-22 @ 04:41) (96% - 100%)  Wt(kg): --    Exam: AOx3, b/l UE 5/5 LLE 5/5 RLE hf 2 ke 4- kf 4- df 5 pf 5 (pain limited), numbness/paresthesias to L5, S1, and S2 distribution, patient would not turn to inspect incision 2/2 pain

## 2022-09-19 NOTE — PHYSICAL THERAPY INITIAL EVALUATION ADULT - PLANNED THERAPY INTERVENTIONS, PT EVAL
stair training- GOAL: 1 flight with rail independent, 4 wks/balance training/bed mobility training/gait training/transfer training

## 2022-09-19 NOTE — PROGRESS NOTE ADULT - SUBJECTIVE AND OBJECTIVE BOX
DATE OF SERVICE: 22 @ 12:46    Patient is a 37y old  Female who presents with a chief complaint of lower back pain (19 Sep 2022 05:27)      SUBJECTIVE / OVERNIGHT EVENTS:  No chest pain. No shortness of breath. No complaiaints.  Had fever overnight.     MEDICATIONS  (STANDING):  dexAMETHasone     Tablet 4 milliGRAM(s) Oral every 12 hours  enoxaparin Injectable 40 milliGRAM(s) SubCutaneous every 24 hours  pantoprazole    Tablet 40 milliGRAM(s) Oral before breakfast  polyethylene glycol 3350 17 Gram(s) Oral two times a day  potassium chloride    Tablet ER 40 milliEquivalent(s) Oral daily  potassium chloride   Powder 40 milliEquivalent(s) Oral every 4 hours  senna 2 Tablet(s) Oral at bedtime    MEDICATIONS  (PRN):  cyclobenzaprine 10 milliGRAM(s) Oral three times a day PRN Muscle Spasm  HYDROmorphone  Injectable 1 milliGRAM(s) IV Push once PRN Severe Pain (7 - 10)  oxyCODONE    IR 10 milliGRAM(s) Oral every 4 hours PRN Severe Pain (7 - 10)      Vital Signs Last 24 Hrs  T(C): 36.9 (19 Sep 2022 08:03), Max: 38.6 (18 Sep 2022 21:41)  T(F): 98.4 (19 Sep 2022 08:03), Max: 101.4 (18 Sep 2022 21:41)  HR: 100 (19 Sep 2022 04:41) (61 - 100)  BP: 123/82 (19 Sep 2022 04:41) (112/76 - 133/88)  BP(mean): --  RR: 18 (19 Sep 2022 04:41) (18 - 18)  SpO2: 98% (19 Sep 2022 04:41) (98% - 100%)    Parameters below as of 19 Sep 2022 04:41  Patient On (Oxygen Delivery Method): room air      CAPILLARY BLOOD GLUCOSE        I&O's Summary    19 Sep 2022 07:01  -  19 Sep 2022 12:46  --------------------------------------------------------  IN: 0 mL / OUT: 700 mL / NET: -700 mL        PHYSICAL EXAM:  GENERAL: NAD, well-developed  HEAD:  Atraumatic, Normocephalic  EYES: EOMI, PERRLA, conjunctiva and sclera clear  NECK: Supple, No JVD  CHEST/LUNG: Clear to auscultation bilaterally; No wheeze  HEART: Regular rate and rhythm; No murmurs, rubs, or gallops  ABDOMEN: Soft, Nontender, Nondistended; Bowel sounds present  EXTREMITIES:  2+ Peripheral Pulses, No clubbing, cyanosis, or edema  PSYCH: AAOx3  NEUROLOGY: non-focal  SKIN: No rashes or lesions    LABS:                        10.3   15.45 )-----------( 254      ( 18 Sep 2022 05:24 )             31.4     -    138  |  106  |  21  ----------------------------<  120<H>  3.0<L>   |  21<L>  |  0.53    Ca    7.5<L>      18 Sep 2022 05:24    TPro  5.4<L>  /  Alb  3.1<L>  /  TBili  0.2  /  DBili  x   /  AST  24  /  ALT  22  /  AlkPhos  49  -          Urinalysis Basic - ( 18 Sep 2022 22:39 )    Color: LIGHT BROWN / Appearance: Slightly Turbid / S.013 / pH: x  Gluc: x / Ketone: Negative  / Bili: Negative / Urobili: Negative   Blood: x / Protein: 30 mg/dL / Nitrite: Negative   Leuk Esterase: Negative / RBC: 2 /hpf / WBC 3 /HPF   Sq Epi: x / Non Sq Epi: 1 /hpf / Bacteria: Negative        RADIOLOGY & ADDITIONAL TESTS:    Imaging Personally Reviewed:    Consultant(s) Notes Reviewed:      Care Discussed with Consultants/Other Providers:

## 2022-09-19 NOTE — PHYSICAL THERAPY INITIAL EVALUATION ADULT - GENERAL OBSERVATIONS, REHAB EVAL
Received pt seated at edge of stretcher in ED. /68, O2 sats 96% on room air, ,  nurse Tanisha payne.

## 2022-09-19 NOTE — PHYSICAL THERAPY INITIAL EVALUATION ADULT - ADDITIONAL COMMENTS
Pt lives with mother, 6x2 stairs to enter, (+)single rail, no stairs once inside. Was ambulating with rolling walker following recent surgery, negotiating stairs with cane and rail.

## 2022-09-19 NOTE — PHYSICAL THERAPY INITIAL EVALUATION ADULT - PERTINENT HX OF CURRENT PROBLEM, REHAB EVAL
37 y/oF with admitted 9/18 [facility] recent L5-S1 laminectomy (9/13), presenting with back pain since 3PM. Patient was standing up from a chair, when felt sudden onset of sharp stabbing pain in the lower back. Patient has been endorsing numbness and tingling in bilateral lower extremities since the surgery. Prior to the pain, patient was able to ambulating with little issue. Patient unable to move 2/2 pain. Patient took her oxycodone 10 and received 100 mg fentanyl in the ambulance to little relief. As per nsg consult, CT L spine showed post-op pneumothorax. Stating pain is likely musculoskeletal in origin. As per nsg follow up 9/19, pt febrile overnight, FU fever work-up

## 2022-09-19 NOTE — CONSULT NOTE ADULT - ASSESSMENT
37F with  recent L5-S1 laminectomy (9/13), readmitted 9.18 22 with  severe back pain with fever, leukocytosis  MRI demonstrates seroma, inflammatory post operative changes    Suggest  Cefepime  Vanco  follow Blood cultures 9/18  defer to Neurosurgery whether seroma should be aspirated and cultured    discussed with primary attending

## 2022-09-20 LAB
ERYTHROCYTE [SEDIMENTATION RATE] IN BLOOD: 72 MM/HR — HIGH (ref 0–15)
METHOD TYPE: SIGNIFICANT CHANGE UP
MRSA PCR RESULT.: SIGNIFICANT CHANGE UP
P AERUGINOSA DNA BLD POS NAA+NON-PROBE: SIGNIFICANT CHANGE UP
S AUREUS DNA NOSE QL NAA+PROBE: SIGNIFICANT CHANGE UP
VANCOMYCIN TROUGH SERPL-MCNC: 8.9 UG/ML — LOW (ref 10–20)

## 2022-09-20 PROCEDURE — 99232 SBSQ HOSP IP/OBS MODERATE 35: CPT

## 2022-09-20 RX ORDER — CEFEPIME 1 G/1
2000 INJECTION, POWDER, FOR SOLUTION INTRAMUSCULAR; INTRAVENOUS EVERY 8 HOURS
Refills: 0 | Status: DISCONTINUED | OUTPATIENT
Start: 2022-09-20 | End: 2022-09-23

## 2022-09-20 RX ORDER — ALPRAZOLAM 0.25 MG
0.5 TABLET ORAL ONCE
Refills: 0 | Status: DISCONTINUED | OUTPATIENT
Start: 2022-09-20 | End: 2022-09-20

## 2022-09-20 RX ORDER — INFLUENZA VIRUS VACCINE 15; 15; 15; 15 UG/.5ML; UG/.5ML; UG/.5ML; UG/.5ML
0.5 SUSPENSION INTRAMUSCULAR ONCE
Refills: 0 | Status: DISCONTINUED | OUTPATIENT
Start: 2022-09-20 | End: 2022-09-23

## 2022-09-20 RX ADMIN — ENOXAPARIN SODIUM 40 MILLIGRAM(S): 100 INJECTION SUBCUTANEOUS at 21:57

## 2022-09-20 RX ADMIN — Medication 650 MILLIGRAM(S): at 09:30

## 2022-09-20 RX ADMIN — Medication 4 MILLIGRAM(S): at 17:55

## 2022-09-20 RX ADMIN — CYCLOBENZAPRINE HYDROCHLORIDE 10 MILLIGRAM(S): 10 TABLET, FILM COATED ORAL at 21:57

## 2022-09-20 RX ADMIN — CEFEPIME 100 MILLIGRAM(S): 1 INJECTION, POWDER, FOR SOLUTION INTRAMUSCULAR; INTRAVENOUS at 13:06

## 2022-09-20 RX ADMIN — Medication 0.5 MILLIGRAM(S): at 13:58

## 2022-09-20 RX ADMIN — Medication 30 MILLIGRAM(S): at 08:30

## 2022-09-20 RX ADMIN — Medication 166.67 MILLIGRAM(S): at 17:55

## 2022-09-20 RX ADMIN — Medication 40 MILLIEQUIVALENT(S): at 13:09

## 2022-09-20 RX ADMIN — Medication 10 MILLIGRAM(S): at 09:38

## 2022-09-20 RX ADMIN — CEFEPIME 100 MILLIGRAM(S): 1 INJECTION, POWDER, FOR SOLUTION INTRAMUSCULAR; INTRAVENOUS at 05:21

## 2022-09-20 RX ADMIN — Medication 650 MILLIGRAM(S): at 08:30

## 2022-09-20 RX ADMIN — Medication 30 MILLIGRAM(S): at 01:30

## 2022-09-20 RX ADMIN — CEFEPIME 100 MILLIGRAM(S): 1 INJECTION, POWDER, FOR SOLUTION INTRAMUSCULAR; INTRAVENOUS at 21:57

## 2022-09-20 RX ADMIN — CYCLOBENZAPRINE HYDROCHLORIDE 10 MILLIGRAM(S): 10 TABLET, FILM COATED ORAL at 01:12

## 2022-09-20 RX ADMIN — SENNA PLUS 2 TABLET(S): 8.6 TABLET ORAL at 21:56

## 2022-09-20 RX ADMIN — Medication 166.67 MILLIGRAM(S): at 06:10

## 2022-09-20 RX ADMIN — Medication 30 MILLIGRAM(S): at 09:00

## 2022-09-20 RX ADMIN — Medication 30 MILLIGRAM(S): at 16:45

## 2022-09-20 RX ADMIN — Medication 1 ENEMA: at 13:58

## 2022-09-20 RX ADMIN — Medication 30 MILLIGRAM(S): at 16:18

## 2022-09-20 RX ADMIN — Medication 30 MILLIGRAM(S): at 01:11

## 2022-09-20 RX ADMIN — PANTOPRAZOLE SODIUM 40 MILLIGRAM(S): 20 TABLET, DELAYED RELEASE ORAL at 05:24

## 2022-09-20 RX ADMIN — Medication 4 MILLIGRAM(S): at 05:21

## 2022-09-20 NOTE — PROGRESS NOTE ADULT - SUBJECTIVE AND OBJECTIVE BOX
Patient seen and examined at bedside.    --Anticoagulation--  enoxaparin Injectable 40 milliGRAM(s) SubCutaneous every 24 hours    T(C): 36.9 (09-19-22 @ 23:37), Max: 38.1 (09-19-22 @ 06:48)  HR: 89 (09-19-22 @ 23:37) (84 - 125)  BP: 120/75 (09-19-22 @ 23:37) (111/68 - 120/75)  RR: 18 (09-19-22 @ 23:37) (18 - 18)  SpO2: 96% (09-19-22 @ 23:37) (95% - 98%)  Wt(kg): --    Exam: AOx3, b/l UE 5/5 LLE 5/5 RLE hf 2 ke 4- kf 4- df 5 pf 5 (pain limited), numbness/paresthesias to L5, S1, and S2 distribution, incision was c/d/i

## 2022-09-20 NOTE — PATIENT PROFILE ADULT - FALL HARM RISK - HARM RISK INTERVENTIONS

## 2022-09-20 NOTE — PROGRESS NOTE ADULT - SUBJECTIVE AND OBJECTIVE BOX
Follow Up:  Pseudomonas bacteremia    Interval History/ROS:  upset about infection, back pain better,    mother at bedside    Allergies  No Known Allergies      ANTIMICROBIALS:  cefepime   IVPB 2000 every 8 hours  vancomycin  IVPB    vancomycin  IVPB 1250 every 12 hours      OTHER MEDS:  MEDICATIONS  (STANDING):  acetaminophen     Tablet .. 650 every 6 hours PRN  cyclobenzaprine 10 three times a day PRN  dexAMETHasone     Tablet 4 every 12 hours  enoxaparin Injectable 40 every 24 hours  influenza   Vaccine 0.5 once  ketorolac   Injectable 30 every 6 hours PRN  oxyCODONE    IR 5 every 4 hours PRN  pantoprazole    Tablet 40 before breakfast  polyethylene glycol 3350 17 two times a day  senna 2 at bedtime      Vital Signs Last 24 Hrs  T(C): 37.2 (20 Sep 2022 16:14), Max: 37.2 (20 Sep 2022 16:14)  T(F): 99 (20 Sep 2022 16:14), Max: 99 (20 Sep 2022 16:14)  HR: 109 (20 Sep 2022 16:14) (69 - 109)  BP: 127/89 (20 Sep 2022 16:14) (113/75 - 147/90)  BP(mean): --  RR: 18 (20 Sep 2022 16:14) (18 - 18)  SpO2: 99% (20 Sep 2022 16:14) (96% - 99%)    Parameters below as of 20 Sep 2022 16:14  Patient On (Oxygen Delivery Method): room air        PHYSICAL EXAM:  General: WN/WD NAD, Non-toxic  Neurology: A&Ox3, nonfocal  Respiratory: Clear to auscultation bilaterally  CV: RRR, S1S2, no murmurs, rubs or gallops  Abdominal: Soft, Non-tender, non-distended, normal bowel sounds  Extremities: No edema, + peripheral pulses  Line Sites: Clear  Skin: No rash                          12.7   14.64 )-----------( 317      ( 19 Sep 2022 15:39 )             39.0   WBC Count: 14.64 ( @ 15:39)  WBC Count: 15.45 ( @ 05:24)        136  |  98  |  18  ----------------------------<  121<H>  4.3   |  25  |  0.71    Ca    9.2      19 Sep 2022 16:48  Phos  3.6       Mg     2.4           Urinalysis Basic - ( 18 Sep 2022 22:39 )    Color: LIGHT BROWN / Appearance: Slightly Turbid / S.013 / pH: x  Gluc: x / Ketone: Negative  / Bili: Negative / Urobili: Negative   Blood: x / Protein: 30 mg/dL / Nitrite: Negative   Leuk Esterase: Negative / RBC: 2 /hpf / WBC 3 /HPF   Sq Epi: x / Non Sq Epi: 1 /hpf / Bacteria: Negative      MICROBIOLOGY:  .Blood Blood-Peripheral  22   Growth in aerobic bottle: Pseudomonas aeruginosa    .Blood Blood-Peripheral  22   No growth to date.  --  --    Vancomycin Level, Trough: 8.9 ug/mL (22 @ 18:04)    RADIOLOGY:  < from: [facility] Duplex Lower Ext Vein Scan, Bilat (22 @ 11:19) >  IMPRESSION:  No evidence of deep venous thrombosis in either lower extremity    < end of copied text >  < from: MR Lumbar Spine w/wo IV Cont (22 @ 10:44) >  IMPRESSION:    Status post recent L5 total laminectomy.    Postsurgical changes emanating from the ventral epidural space occupies   approximately 70-75% of the spinal canal diameter. The presence of   superinfection (epidural phlegmon/abscess) is not entirely excluded.   Clinical correlation is recommended.    Enhancing soft tissue within the posterior aspect of the spinal canal at   the L5-S1 level likely representing granulation/scar tissue.    Postsurgical seroma at the level of the laminectomy defect measures   approximately 1.9 x 3.6 cm. Superinfection is not entirely excluded.    < end of copied text >      Yo Nam MD; Division of Infectious Disease; Pager: [phone]; nights and weekends: [phone]

## 2022-09-20 NOTE — PROVIDER CONTACT NOTE (CRITICAL VALUE NOTIFICATION) - ACTION/TREATMENT ORDERED:
pt on IV vanco and Cefepime will continue with that for now and monitor pt for fever and sign of sepsis.

## 2022-09-20 NOTE — PROGRESS NOTE ADULT - SUBJECTIVE AND OBJECTIVE BOX
Pt seen and examined. Mother at beside. Resting comfortably in bed.  GARNER well.  Back pain much better since being admitted.  ID note appreciated and report of + bld Cx appreciated    MRI shows post-op seroma.    Would not recommend drainage of seroma at this time.  Agree with Rx with Abx as per ID  Cont' present managment

## 2022-09-20 NOTE — PHARMACOTHERAPY INTERVENTION NOTE - COMMENTS
CLAIRE NOONAN, 37y Female with Pseudomonas aeruginosa bacteremia, source possibly secondary to post-operative laminectomy. Patient was started on vancomycin and cefepime empirically, being followed by ID Dr. Nam.    Recommendation(s):  1) Discussed with Dr. Nam, patient was initially started on cefepime 1 gram IV Q12H, since patient has good renal function and now with Pseudomonas bacteremia, suggested to increase dose to cefepime 2 grams IV Q8H.  2) F/u final blood culture results/sensitivities to guide therapy adjustments    With kind regards,  Sebastián Davison PharmD  Infectious Diseases Clinical Pharmacist  Available on [employer] Teams  .

## 2022-09-20 NOTE — PROGRESS NOTE ADULT - SUBJECTIVE AND OBJECTIVE BOX
DATE OF SERVICE: 22 @ 16:17    Patient is a 37y old  Female who presents with a chief complaint of lower back pain (20 Sep 2022 05:51)      SUBJECTIVE / OVERNIGHT EVENTS:  No chest pain. No shortness of breath. No complaints. No events overnight. pain is better controlled with toradol    MEDICATIONS  (STANDING):  cefepime   IVPB 2000 milliGRAM(s) IV Intermittent every 8 hours  dexAMETHasone     Tablet 4 milliGRAM(s) Oral every 12 hours  enoxaparin Injectable 40 milliGRAM(s) SubCutaneous every 24 hours  influenza   Vaccine 0.5 milliLiter(s) IntraMuscular once  pantoprazole    Tablet 40 milliGRAM(s) Oral before breakfast  polyethylene glycol 3350 17 Gram(s) Oral two times a day  potassium chloride    Tablet ER 40 milliEquivalent(s) Oral daily  senna 2 Tablet(s) Oral at bedtime  vancomycin  IVPB      vancomycin  IVPB 1250 milliGRAM(s) IV Intermittent every 12 hours    MEDICATIONS  (PRN):  acetaminophen     Tablet .. 650 milliGRAM(s) Oral every 6 hours PRN Mild Pain (1 - 3)  cyclobenzaprine 10 milliGRAM(s) Oral three times a day PRN Muscle Spasm  ketorolac   Injectable 30 milliGRAM(s) IV Push every 6 hours PRN Severe Pain (7 - 10)  oxyCODONE    IR 5 milliGRAM(s) Oral every 4 hours PRN Moderate Pain (4 - 6)      Vital Signs Last 24 Hrs  T(C): 37.2 (20 Sep 2022 16:14), Max: 37.2 (20 Sep 2022 16:14)  T(F): 99 (20 Sep 2022 16:14), Max: 99 (20 Sep 2022 16:14)  HR: 109 (20 Sep 2022 16:14) (69 - 109)  BP: 127/89 (20 Sep 2022 16:14) (113/75 - 147/90)  BP(mean): --  RR: 18 (20 Sep 2022 16:14) (18 - 18)  SpO2: 99% (20 Sep 2022 16:14) (96% - 99%)    Parameters below as of 20 Sep 2022 16:14  Patient On (Oxygen Delivery Method): room air      CAPILLARY BLOOD GLUCOSE        I&O's Summary    19 Sep 2022 07:01  -  20 Sep 2022 07:00  --------------------------------------------------------  IN: 300 mL / OUT: 1100 mL / NET: -800 mL        PHYSICAL EXAM:  GENERAL: NAD, well-developed  HEAD:  Atraumatic, Normocephalic  EYES: EOMI, PERRLA, conjunctiva and sclera clear  NECK: Supple, No JVD  CHEST/LUNG: Clear to auscultation bilaterally; No wheeze  HEART: Regular rate and rhythm; No murmurs, rubs, or gallops  ABDOMEN: Soft, Nontender, Nondistended; Bowel sounds present  EXTREMITIES:  2+ Peripheral Pulses, No clubbing, cyanosis, or edema  PSYCH: AAOx3  NEUROLOGY: non-focal  SKIN: No rashes or lesions    LABS:                        12.7   14.64 )-----------( 317      ( 19 Sep 2022 15:39 )             39.0         136  |  98  |  18  ----------------------------<  121<H>  4.3   |  25  |  0.71    Ca    9.2      19 Sep 2022 16:48  Phos  3.6       Mg     2.4       Culture - Blood in AM (22 @ 22:28)   - Pseudomonas aeruginosa: Detec   Gram Stain:   Growth in aerobic bottle: Gram Negative Rods   Specimen Source: .Blood Blood-Peripheral   Organism: Blood Culture PCR   Culture Results:   Growth in aerobic bottle: Gram Negative Rods         Urinalysis Basic - ( 18 Sep 2022 22:39 )    Color: LIGHT BROWN / Appearance: Slightly Turbid / S.013 / pH: x  Gluc: x / Ketone: Negative  / Bili: Negative / Urobili: Negative   Blood: x / Protein: 30 mg/dL / Nitrite: Negative   Leuk Esterase: Negative / RBC: 2 /hpf / WBC 3 /HPF   Sq Epi: x / Non Sq Epi: 1 /hpf / Bacteria: Negative    < from: MR Lumbar Spine w/wo IV Cont (22 @ 10:44) >    INTERPRETATION:  MRI of the lumbar spine with contrast    CLINICAL INDICATION:  Low back pain, acute on chronic. Spinal stenosis.   Status post L5 total laminectomy.    TECHNIQUE: Multiplanar, multisequence MR images of the lumbar spine were   obtained before and after the intravenous administration of 10 cc of   Gadavist. 0 cc were discarded.    COMPARISON: CT lumbar spine 2022. MRI lumbar spine 2022.    FINDINGS:    Status post total L5 laminectomy.    Vertebral body height, marrow signal homogeneity, and facet alignment are   maintained throughout the visualized spinal segments.    Mild disc space narrowing at L5-S1.    Theconus is normal in size, position, and signal characteristics, ending   at L2.    L1-L2: No spinal canal stenosis or neural foraminal narrowing.    L2-L3: No spinal canal stenosis or neural foraminal narrowing.    L3-L4: No spinal canal stenosis or neural foraminal narrowing.    L4-L5: Prominent epidural fat results in mild thecal sac compression. No   neural foraminal narrowing.    L5-S1: Postsurgical changes emanating from the ventral epidural space   occupies approximately 70-75% of the spinalcanal diameter. No neural   foraminal narrowing.    Enhancing soft tissue within the posterior aspect of the spinal canal at   the L5-S1 level likely representing granulation/scar tissue. Postsurgical   seroma at the level of the laminectomy defect measures approximately 1.9   x 3.6 cm.    IMPRESSION:    Status post recent L5 total laminectomy.    Postsurgical changes emanating from the ventral epidural space occupies   approximately 70-75% of the spinal canal diameter. The presence of   superinfection (epidural phlegmon/abscess) is not entirely excluded.   Clinical correlation is recommended.    Enhancing soft tissue within the posterior aspect of the spinal canal at   the L5-S1 level likely representing granulation/scar tissue.    Postsurgical seroma at the level of the laminectomy defect measures   approximately 1.9 x 3.6 cm. Superinfection is not entirely excluded.    Dr. Madden discussed these findings with Dr. Patricio on 2022 3:19   PM with read back.    < end of copied text >      RADIOLOGY & ADDITIONAL TESTS:    Imaging Personally Reviewed:    Consultant(s) Notes Reviewed:      Care Discussed with Consultants/Other Providers:

## 2022-09-21 LAB
ANION GAP SERPL CALC-SCNC: 13 MMOL/L — SIGNIFICANT CHANGE UP (ref 5–17)
BUN SERPL-MCNC: 25 MG/DL — HIGH (ref 7–23)
CALCIUM SERPL-MCNC: 9.2 MG/DL — SIGNIFICANT CHANGE UP (ref 8.4–10.5)
CHLORIDE SERPL-SCNC: 101 MMOL/L — SIGNIFICANT CHANGE UP (ref 96–108)
CO2 SERPL-SCNC: 22 MMOL/L — SIGNIFICANT CHANGE UP (ref 22–31)
CREAT SERPL-MCNC: 0.71 MG/DL — SIGNIFICANT CHANGE UP (ref 0.5–1.3)
EGFR: 114 ML/MIN/1.73M2 — SIGNIFICANT CHANGE UP
GLUCOSE SERPL-MCNC: 193 MG/DL — HIGH (ref 70–99)
HCT VFR BLD CALC: 35.7 % — SIGNIFICANT CHANGE UP (ref 34.5–45)
HGB BLD-MCNC: 11.7 G/DL — SIGNIFICANT CHANGE UP (ref 11.5–15.5)
MCHC RBC-ENTMCNC: 28.6 PG — SIGNIFICANT CHANGE UP (ref 27–34)
MCHC RBC-ENTMCNC: 32.8 GM/DL — SIGNIFICANT CHANGE UP (ref 32–36)
MCV RBC AUTO: 87.3 FL — SIGNIFICANT CHANGE UP (ref 80–100)
NRBC # BLD: 0 /100 WBCS — SIGNIFICANT CHANGE UP (ref 0–0)
PLATELET # BLD AUTO: 305 K/UL — SIGNIFICANT CHANGE UP (ref 150–400)
POTASSIUM SERPL-MCNC: 4.5 MMOL/L — SIGNIFICANT CHANGE UP (ref 3.5–5.3)
POTASSIUM SERPL-SCNC: 4.5 MMOL/L — SIGNIFICANT CHANGE UP (ref 3.5–5.3)
RBC # BLD: 4.09 M/UL — SIGNIFICANT CHANGE UP (ref 3.8–5.2)
RBC # FLD: 12.7 % — SIGNIFICANT CHANGE UP (ref 10.3–14.5)
SODIUM SERPL-SCNC: 136 MMOL/L — SIGNIFICANT CHANGE UP (ref 135–145)
VANCOMYCIN TROUGH SERPL-MCNC: 8.7 UG/ML — LOW (ref 10–20)
WBC # BLD: 9.98 K/UL — SIGNIFICANT CHANGE UP (ref 3.8–10.5)
WBC # FLD AUTO: 9.98 K/UL — SIGNIFICANT CHANGE UP (ref 3.8–10.5)

## 2022-09-21 RX ORDER — ALPRAZOLAM 0.25 MG
0.5 TABLET ORAL ONCE
Refills: 0 | Status: DISCONTINUED | OUTPATIENT
Start: 2022-09-21 | End: 2022-09-21

## 2022-09-21 RX ADMIN — Medication 30 MILLIGRAM(S): at 18:04

## 2022-09-21 RX ADMIN — CYCLOBENZAPRINE HYDROCHLORIDE 10 MILLIGRAM(S): 10 TABLET, FILM COATED ORAL at 12:59

## 2022-09-21 RX ADMIN — ENOXAPARIN SODIUM 40 MILLIGRAM(S): 100 INJECTION SUBCUTANEOUS at 22:04

## 2022-09-21 RX ADMIN — Medication 30 MILLIGRAM(S): at 17:49

## 2022-09-21 RX ADMIN — Medication 0.5 MILLIGRAM(S): at 23:49

## 2022-09-21 RX ADMIN — PANTOPRAZOLE SODIUM 40 MILLIGRAM(S): 20 TABLET, DELAYED RELEASE ORAL at 05:37

## 2022-09-21 RX ADMIN — Medication 4 MILLIGRAM(S): at 05:36

## 2022-09-21 RX ADMIN — Medication 40 MILLIEQUIVALENT(S): at 11:18

## 2022-09-21 RX ADMIN — Medication 4 MILLIGRAM(S): at 17:50

## 2022-09-21 RX ADMIN — CEFEPIME 100 MILLIGRAM(S): 1 INJECTION, POWDER, FOR SOLUTION INTRAMUSCULAR; INTRAVENOUS at 22:06

## 2022-09-21 RX ADMIN — Medication 30 MILLIGRAM(S): at 00:12

## 2022-09-21 RX ADMIN — CYCLOBENZAPRINE HYDROCHLORIDE 10 MILLIGRAM(S): 10 TABLET, FILM COATED ORAL at 04:54

## 2022-09-21 RX ADMIN — Medication 30 MILLIGRAM(S): at 00:44

## 2022-09-21 RX ADMIN — CEFEPIME 100 MILLIGRAM(S): 1 INJECTION, POWDER, FOR SOLUTION INTRAMUSCULAR; INTRAVENOUS at 05:23

## 2022-09-21 RX ADMIN — SENNA PLUS 2 TABLET(S): 8.6 TABLET ORAL at 22:04

## 2022-09-21 RX ADMIN — CEFEPIME 100 MILLIGRAM(S): 1 INJECTION, POWDER, FOR SOLUTION INTRAMUSCULAR; INTRAVENOUS at 14:04

## 2022-09-21 RX ADMIN — Medication 166.67 MILLIGRAM(S): at 06:50

## 2022-09-21 NOTE — PROVIDER CONTACT NOTE (OTHER) - SITUATION
Pt had laminectomy done outpatient 9/13. Pt said dressing was changed once before he got here but is asking for it to be changed again
Temp 101.4, oral

## 2022-09-21 NOTE — PROVIDER CONTACT NOTE (OTHER) - ASSESSMENT
A&Ox4. VSS. Pt incision site has gauze and tagaderm on it. Pt was asking if we could change it because it hasn't been changed in a few days. I wanted to verify that it would be OK for me to change this dressing.
Temp 101.4F, oral.

## 2022-09-21 NOTE — PROVIDER CONTACT NOTE (OTHER) - BACKGROUND
Admitted for dorsalgia. PMH: Back pain, lumbar herniated disc. L5-S1 laminectomy done 9/13
36 y/o female admitted for dorsalgia. Pt s/p laminectomy on 9/13.

## 2022-09-21 NOTE — PROGRESS NOTE ADULT - SUBJECTIVE AND OBJECTIVE BOX
DATE OF SERVICE: 09-21-22 @ 11:53    Patient is a 37y old  Female who presents with a chief complaint of lower back pain (20 Sep 2022 19:16)      SUBJECTIVE / OVERNIGHT EVENTS:  No chest pain. No shortness of breath. No complaints. No events overnight. feels much better.  had BM    MEDICATIONS  (STANDING):  cefepime   IVPB 2000 milliGRAM(s) IV Intermittent every 8 hours  dexAMETHasone     Tablet 4 milliGRAM(s) Oral every 12 hours  enoxaparin Injectable 40 milliGRAM(s) SubCutaneous every 24 hours  influenza   Vaccine 0.5 milliLiter(s) IntraMuscular once  pantoprazole    Tablet 40 milliGRAM(s) Oral before breakfast  polyethylene glycol 3350 17 Gram(s) Oral two times a day  potassium chloride    Tablet ER 40 milliEquivalent(s) Oral daily  senna 2 Tablet(s) Oral at bedtime    MEDICATIONS  (PRN):  acetaminophen     Tablet .. 650 milliGRAM(s) Oral every 6 hours PRN Mild Pain (1 - 3)  cyclobenzaprine 10 milliGRAM(s) Oral three times a day PRN Muscle Spasm  ketorolac   Injectable 30 milliGRAM(s) IV Push every 6 hours PRN Severe Pain (7 - 10)  oxyCODONE    IR 5 milliGRAM(s) Oral every 4 hours PRN Moderate Pain (4 - 6)      Vital Signs Last 24 Hrs  T(C): 36.7 (21 Sep 2022 10:29), Max: 37.2 (20 Sep 2022 16:14)  T(F): 98.1 (21 Sep 2022 10:29), Max: 99 (20 Sep 2022 16:14)  HR: 97 (21 Sep 2022 10:29) (88 - 109)  BP: 134/87 (21 Sep 2022 10:29) (127/89 - 134/87)  BP(mean): --  RR: 18 (21 Sep 2022 10:29) (18 - 18)  SpO2: 97% (21 Sep 2022 10:29) (97% - 99%)    Parameters below as of 21 Sep 2022 10:29  Patient On (Oxygen Delivery Method): room air      CAPILLARY BLOOD GLUCOSE        I&O's Summary    20 Sep 2022 07:01  -  21 Sep 2022 07:00  --------------------------------------------------------  IN: 890 mL / OUT: 0 mL / NET: 890 mL    21 Sep 2022 07:01  -  21 Sep 2022 11:53  --------------------------------------------------------  IN: 200 mL / OUT: 0 mL / NET: 200 mL        PHYSICAL EXAM:  GENERAL: NAD, well-developed  HEAD:  Atraumatic, Normocephalic  EYES: EOMI, PERRLA, conjunctiva and sclera clear  NECK: Supple, No JVD  CHEST/LUNG: Clear to auscultation bilaterally; No wheeze  HEART: Regular rate and rhythm; No murmurs, rubs, or gallops  ABDOMEN: Soft, Nontender, Nondistended; Bowel sounds present  EXTREMITIES:  2+ Peripheral Pulses, No clubbing, cyanosis, or edema  PSYCH: AAOx3  NEUROLOGY: non-focal  SKIN: No rashes or lesions    LABS:                        11.7   9.98  )-----------( 305      ( 21 Sep 2022 07:22 )             35.7     09-21    136  |  101  |  25<H>  ----------------------------<  193<H>  4.5   |  22  |  0.71    Ca    9.2      21 Sep 2022 07:25  Phos  3.6     09-19  Mg     2.4     09-19                RADIOLOGY & ADDITIONAL TESTS:    Imaging Personally Reviewed:    Consultant(s) Notes Reviewed:      Care Discussed with Consultants/Other Providers:

## 2022-09-21 NOTE — PROVIDER CONTACT NOTE (OTHER) - ACTION/TREATMENT ORDERED:
ACP notified. Blood cultures to be sent. UA and urine culture to be collected. Tylenol ordered.
OK to change dressing

## 2022-09-22 ENCOUNTER — TRANSCRIPTION ENCOUNTER (OUTPATIENT)
Age: 37
End: 2022-09-22

## 2022-09-22 ENCOUNTER — NON-APPOINTMENT (OUTPATIENT)
Age: 37
End: 2022-09-22

## 2022-09-22 LAB
-  AMIKACIN: SIGNIFICANT CHANGE UP
-  AZTREONAM: SIGNIFICANT CHANGE UP
-  CEFEPIME: SIGNIFICANT CHANGE UP
-  CEFTAZIDIME/AVIBACTAM: SIGNIFICANT CHANGE UP
-  CEFTAZIDIME: SIGNIFICANT CHANGE UP
-  CEFTOLOZANE/TAZOBACTAM: SIGNIFICANT CHANGE UP
-  CIPROFLOXACIN: SIGNIFICANT CHANGE UP
-  GENTAMICIN: SIGNIFICANT CHANGE UP
-  IMIPENEM: SIGNIFICANT CHANGE UP
-  LEVOFLOXACIN: SIGNIFICANT CHANGE UP
-  MEROPENEM: SIGNIFICANT CHANGE UP
-  PIPERACILLIN/TAZOBACTAM: SIGNIFICANT CHANGE UP
-  TOBRAMYCIN: SIGNIFICANT CHANGE UP
CULTURE RESULTS: SIGNIFICANT CHANGE UP
METHOD TYPE: SIGNIFICANT CHANGE UP
ORGANISM # SPEC MICROSCOPIC CNT: SIGNIFICANT CHANGE UP
SPECIMEN SOURCE: SIGNIFICANT CHANGE UP

## 2022-09-22 PROCEDURE — 99232 SBSQ HOSP IP/OBS MODERATE 35: CPT

## 2022-09-22 RX ORDER — ALPRAZOLAM 0.25 MG
0.5 TABLET ORAL ONCE
Refills: 0 | Status: DISCONTINUED | OUTPATIENT
Start: 2022-09-22 | End: 2022-09-22

## 2022-09-22 RX ADMIN — PANTOPRAZOLE SODIUM 40 MILLIGRAM(S): 20 TABLET, DELAYED RELEASE ORAL at 05:21

## 2022-09-22 RX ADMIN — Medication 0.5 MILLIGRAM(S): at 23:23

## 2022-09-22 RX ADMIN — CEFEPIME 100 MILLIGRAM(S): 1 INJECTION, POWDER, FOR SOLUTION INTRAMUSCULAR; INTRAVENOUS at 05:22

## 2022-09-22 RX ADMIN — POLYETHYLENE GLYCOL 3350 17 GRAM(S): 17 POWDER, FOR SOLUTION ORAL at 09:07

## 2022-09-22 RX ADMIN — CYCLOBENZAPRINE HYDROCHLORIDE 10 MILLIGRAM(S): 10 TABLET, FILM COATED ORAL at 22:00

## 2022-09-22 RX ADMIN — SENNA PLUS 2 TABLET(S): 8.6 TABLET ORAL at 22:00

## 2022-09-22 RX ADMIN — Medication 4 MILLIGRAM(S): at 05:22

## 2022-09-22 RX ADMIN — Medication 30 MILLIGRAM(S): at 14:02

## 2022-09-22 RX ADMIN — CEFEPIME 100 MILLIGRAM(S): 1 INJECTION, POWDER, FOR SOLUTION INTRAMUSCULAR; INTRAVENOUS at 22:00

## 2022-09-22 RX ADMIN — CEFEPIME 100 MILLIGRAM(S): 1 INJECTION, POWDER, FOR SOLUTION INTRAMUSCULAR; INTRAVENOUS at 14:02

## 2022-09-22 RX ADMIN — Medication 30 MILLIGRAM(S): at 14:30

## 2022-09-22 RX ADMIN — Medication 4 MILLIGRAM(S): at 17:44

## 2022-09-22 RX ADMIN — ENOXAPARIN SODIUM 40 MILLIGRAM(S): 100 INJECTION SUBCUTANEOUS at 21:59

## 2022-09-22 NOTE — PROGRESS NOTE ADULT - SUBJECTIVE AND OBJECTIVE BOX
Patient seen and examined at bedside. Strength improved.    --Anticoagulation--  enoxaparin Injectable 40 milliGRAM(s) SubCutaneous every 24 hours    T(C): 36.7 (09-22-22 @ 00:20), Max: 36.7 (09-21-22 @ 10:29)  HR: 75 (09-22-22 @ 00:20) (75 - 97)  BP: 114/72 (09-22-22 @ 00:20) (114/72 - 134/87)  RR: 18 (09-22-22 @ 00:20) (18 - 18)  SpO2: 97% (09-22-22 @ 00:20) (97% - 98%)  Wt(kg): --    Exam: AOx3, b/l UE 5/5 LLE 5/5 RLE 5/5   Patient seen and examined at bedside. Strength improved.    --Anticoagulation--  enoxaparin Injectable 40 milliGRAM(s) SubCutaneous every 24 hours    T(C): 36.7 (09-22-22 @ 00:20), Max: 36.7 (09-21-22 @ 10:29)  HR: 75 (09-22-22 @ 00:20) (75 - 97)  BP: 114/72 (09-22-22 @ 00:20) (114/72 - 134/87)  RR: 18 (09-22-22 @ 00:20) (18 - 18)  SpO2: 97% (09-22-22 @ 00:20) (97% - 98%)  Wt(kg): --    Exam: AOx3, b/l UE 5/5 LLE 5/5 RLE 5/5, still has some numbness along LLE

## 2022-09-22 NOTE — PROGRESS NOTE ADULT - SUBJECTIVE AND OBJECTIVE BOX
DATE OF SERVICE: 09-22-22 @ 13:19    Patient is a 37y old  Female who presents with a chief complaint of lower back pain (22 Sep 2022 05:46)      SUBJECTIVE / OVERNIGHT EVENTS:  feels much better, wants to go home    MEDICATIONS  (STANDING):  cefepime   IVPB 2000 milliGRAM(s) IV Intermittent every 8 hours  dexAMETHasone     Tablet 4 milliGRAM(s) Oral every 12 hours  enoxaparin Injectable 40 milliGRAM(s) SubCutaneous every 24 hours  influenza   Vaccine 0.5 milliLiter(s) IntraMuscular once  pantoprazole    Tablet 40 milliGRAM(s) Oral before breakfast  polyethylene glycol 3350 17 Gram(s) Oral two times a day  potassium chloride    Tablet ER 40 milliEquivalent(s) Oral daily  senna 2 Tablet(s) Oral at bedtime    MEDICATIONS  (PRN):  acetaminophen     Tablet .. 650 milliGRAM(s) Oral every 6 hours PRN Mild Pain (1 - 3)  cyclobenzaprine 10 milliGRAM(s) Oral three times a day PRN Muscle Spasm  ketorolac   Injectable 30 milliGRAM(s) IV Push every 6 hours PRN Severe Pain (7 - 10)  oxyCODONE    IR 5 milliGRAM(s) Oral every 4 hours PRN Moderate Pain (4 - 6)      Vital Signs Last 24 Hrs  T(C): 36.5 (22 Sep 2022 08:59), Max: 36.7 (21 Sep 2022 16:07)  T(F): 97.7 (22 Sep 2022 08:59), Max: 98.1 (21 Sep 2022 16:07)  HR: 83 (22 Sep 2022 08:59) (75 - 85)  BP: 107/71 (22 Sep 2022 08:59) (107/71 - 124/77)  BP(mean): --  RR: 18 (22 Sep 2022 08:59) (18 - 18)  SpO2: 96% (22 Sep 2022 08:59) (96% - 98%)    Parameters below as of 22 Sep 2022 08:59  Patient On (Oxygen Delivery Method): room air      CAPILLARY BLOOD GLUCOSE        I&O's Summary    21 Sep 2022 07:01  -  22 Sep 2022 07:00  --------------------------------------------------------  IN: 760 mL / OUT: 0 mL / NET: 760 mL        PHYSICAL EXAM:  GENERAL: NAD, well-developed  HEAD:  Atraumatic, Normocephalic  EYES: EOMI, PERRLA, conjunctiva and sclera clear  NECK: Supple, No JVD  CHEST/LUNG: Clear to auscultation bilaterally; No wheeze  HEART: Regular rate and rhythm; No murmurs, rubs, or gallops  ABDOMEN: Soft, Nontender, Nondistended; Bowel sounds present  EXTREMITIES:  2+ Peripheral Pulses, No clubbing, cyanosis, or edema  PSYCH: AAOx3  NEUROLOGY: non-focal  SKIN: No rashes or lesions    LABS:                        11.7   9.98  )-----------( 305      ( 21 Sep 2022 07:22 )             35.7     09-21    136  |  101  |  25<H>  ----------------------------<  193<H>  4.5   |  22  |  0.71    Ca    9.2      21 Sep 2022 07:25                RADIOLOGY & ADDITIONAL TESTS:    Imaging Personally Reviewed:    Consultant(s) Notes Reviewed:      Care Discussed with Consultants/Other Providers:

## 2022-09-22 NOTE — PROGRESS NOTE ADULT - SUBJECTIVE AND OBJECTIVE BOX
Follow Up:  Pseudomonas bacteremia    Interval History/ROS:  feels better, anxious for discharge    Allergies  No Known Allergies    ANTIMICROBIALS:  cefepime   IVPB 2000 every 8 hours      OTHER MEDS:  MEDICATIONS  (STANDING):  acetaminophen     Tablet .. 650 every 6 hours PRN  cyclobenzaprine 10 three times a day PRN  dexAMETHasone     Tablet 4 every 12 hours  enoxaparin Injectable 40 every 24 hours  influenza   Vaccine 0.5 once  ketorolac   Injectable 30 every 6 hours PRN  oxyCODONE    IR 5 every 4 hours PRN  pantoprazole    Tablet 40 before breakfast  polyethylene glycol 3350 17 two times a day  senna 2 at bedtime      Vital Signs Last 24 Hrs  T(C): 36.5 (22 Sep 2022 08:59), Max: 36.7 (22 Sep 2022 00:20)  T(F): 97.7 (22 Sep 2022 08:59), Max: 98.1 (22 Sep 2022 00:20)  HR: 83 (22 Sep 2022 08:59) (75 - 83)  BP: 107/71 (22 Sep 2022 08:59) (107/71 - 114/72)  BP(mean): --  RR: 18 (22 Sep 2022 08:59) (18 - 18)  SpO2: 96% (22 Sep 2022 08:59) (96% - 97%)    Parameters below as of 22 Sep 2022 08:59  Patient On (Oxygen Delivery Method): room air    PHYSICAL EXAM:  General: WN/WD NAD, Non-toxic  Neurology: A&Ox3, nonfocal  Respiratory: Clear to auscultation bilaterally  CV: RRR, S1S2, no murmurs, rubs or gallops  Abdominal: Soft, Non-tender, non-distended, normal bowel sounds  Extremities: No edema  Line Sites: Clear  Skin: No rash                          11.7   9.98  )-----------( 305      ( 21 Sep 2022 07:22 )             35.7       09-21    136  |  101  |  25<H>  ----------------------------<  193<H>  4.5   |  22  |  0.71    Ca    9.2      21 Sep 2022 07:25    MICROBIOLOGY:  .Blood Blood  09-21-22   No growth to date.  --  --      .Blood Blood-Peripheral  09-18-22   Growth in aerobic bottle: Pseudomonas aeruginosa (Carbapenem Resistant)   (09.18.22 @ 22:28)   - Ceftolozane/tazobactam: S <=2   - Ceftazidime/Avibactam: S <=4   - Amikacin: S <=16   - Aztreonam: S <=4   - Cefepime: S <=2   - Ceftazidime: S <=1   - Ciprofloxacin: S <=0.25   - Gentamicin: S <=2   - Imipenem: R >8   - Levofloxacin: S <=0.5   - Meropenem: I 4   - Piperacillin/Tazobactam: S <=8   - Tobramycin: S <=2   .Blood Blood-Peripheral  09-18-22   No growth to date.  --  --      RADIOLOGY:  < from: MR Lumbar Spine w/wo IV Cont (09.19.22 @ 10:44) >  IMPRESSION:    Status post recent L5 total laminectomy.    Postsurgical changes emanating from the ventral epidural space occupies   approximately 70-75% of the spinal canal diameter. The presence of   superinfection (epidural phlegmon/abscess) is not entirely excluded.   Clinical correlation is recommended.    Enhancing soft tissue within the posterior aspect of the spinal canal at   the L5-S1 level likely representing granulation/scar tissue.    Postsurgical seroma at the level of the laminectomy defect measures   approximately 1.9 x 3.6 cm. Superinfection is not entirely excluded.    < end of copied text >      Yo Nam MD; Division of Infectious Disease; Pager: [phone]; nights and weekends: [phone]

## 2022-09-23 ENCOUNTER — APPOINTMENT (OUTPATIENT)
Dept: SPINE | Facility: CLINIC | Age: 37
End: 2022-09-23

## 2022-09-23 ENCOUNTER — TRANSCRIPTION ENCOUNTER (OUTPATIENT)
Age: 37
End: 2022-09-23

## 2022-09-23 VITALS
DIASTOLIC BLOOD PRESSURE: 82 MMHG | RESPIRATION RATE: 18 BRPM | SYSTOLIC BLOOD PRESSURE: 130 MMHG | TEMPERATURE: 98 F | OXYGEN SATURATION: 98 % | HEART RATE: 84 BPM

## 2022-09-23 PROCEDURE — 81001 URINALYSIS AUTO W/SCOPE: CPT

## 2022-09-23 PROCEDURE — 84145 PROCALCITONIN (PCT): CPT

## 2022-09-23 PROCEDURE — 85027 COMPLETE CBC AUTOMATED: CPT

## 2022-09-23 PROCEDURE — 80053 COMPREHEN METABOLIC PANEL: CPT

## 2022-09-23 PROCEDURE — U0005: CPT

## 2022-09-23 PROCEDURE — 71045 X-RAY EXAM CHEST 1 VIEW: CPT

## 2022-09-23 PROCEDURE — 99232 SBSQ HOSP IP/OBS MODERATE 35: CPT

## 2022-09-23 PROCEDURE — 80048 BASIC METABOLIC PNL TOTAL CA: CPT

## 2022-09-23 PROCEDURE — 97161 PT EVAL LOW COMPLEX 20 MIN: CPT

## 2022-09-23 PROCEDURE — 99285 EMERGENCY DEPT VISIT HI MDM: CPT | Mod: 25

## 2022-09-23 PROCEDURE — G1004: CPT

## 2022-09-23 PROCEDURE — 93970 EXTREMITY STUDY: CPT

## 2022-09-23 PROCEDURE — 97116 GAIT TRAINING THERAPY: CPT

## 2022-09-23 PROCEDURE — 82746 ASSAY OF FOLIC ACID SERUM: CPT

## 2022-09-23 PROCEDURE — 72131 CT LUMBAR SPINE W/O DYE: CPT | Mod: ME

## 2022-09-23 PROCEDURE — 97530 THERAPEUTIC ACTIVITIES: CPT

## 2022-09-23 PROCEDURE — 96374 THER/PROPH/DIAG INJ IV PUSH: CPT

## 2022-09-23 PROCEDURE — 80202 ASSAY OF VANCOMYCIN: CPT

## 2022-09-23 PROCEDURE — U0003: CPT

## 2022-09-23 PROCEDURE — 87186 SC STD MICRODIL/AGAR DIL: CPT

## 2022-09-23 PROCEDURE — 72158 MRI LUMBAR SPINE W/O & W/DYE: CPT

## 2022-09-23 PROCEDURE — 87150 DNA/RNA AMPLIFIED PROBE: CPT

## 2022-09-23 PROCEDURE — 83735 ASSAY OF MAGNESIUM: CPT

## 2022-09-23 PROCEDURE — 83605 ASSAY OF LACTIC ACID: CPT

## 2022-09-23 PROCEDURE — 82607 VITAMIN B-12: CPT

## 2022-09-23 PROCEDURE — 96361 HYDRATE IV INFUSION ADD-ON: CPT

## 2022-09-23 PROCEDURE — 36415 COLL VENOUS BLD VENIPUNCTURE: CPT

## 2022-09-23 PROCEDURE — A9585: CPT

## 2022-09-23 PROCEDURE — 84100 ASSAY OF PHOSPHORUS: CPT

## 2022-09-23 PROCEDURE — 87040 BLOOD CULTURE FOR BACTERIA: CPT

## 2022-09-23 PROCEDURE — 96375 TX/PRO/DX INJ NEW DRUG ADDON: CPT

## 2022-09-23 PROCEDURE — 85652 RBC SED RATE AUTOMATED: CPT

## 2022-09-23 PROCEDURE — 87640 STAPH A DNA AMP PROBE: CPT

## 2022-09-23 PROCEDURE — 87641 MR-STAPH DNA AMP PROBE: CPT

## 2022-09-23 PROCEDURE — 84443 ASSAY THYROID STIM HORMONE: CPT

## 2022-09-23 RX ORDER — ALPRAZOLAM 0.25 MG
0.5 TABLET ORAL ONCE
Refills: 0 | Status: DISCONTINUED | OUTPATIENT
Start: 2022-09-23 | End: 2022-09-23

## 2022-09-23 RX ORDER — KETOROLAC TROMETHAMINE 30 MG/ML
1 SYRINGE (ML) INJECTION
Qty: 20 | Refills: 0
Start: 2022-09-23 | End: 2022-09-27

## 2022-09-23 RX ORDER — CIPROFLOXACIN LACTATE 400MG/40ML
1 VIAL (ML) INTRAVENOUS
Qty: 48 | Refills: 0
Start: 2022-09-23

## 2022-09-23 RX ORDER — SENNA PLUS 8.6 MG/1
2 TABLET ORAL
Qty: 0 | Refills: 0 | DISCHARGE
Start: 2022-09-23

## 2022-09-23 RX ORDER — POLYETHYLENE GLYCOL 3350 17 G/17G
17 POWDER, FOR SOLUTION ORAL
Qty: 0 | Refills: 0 | DISCHARGE
Start: 2022-09-23

## 2022-09-23 RX ORDER — ACETAMINOPHEN 500 MG
2 TABLET ORAL
Qty: 0 | Refills: 0 | DISCHARGE
Start: 2022-09-23

## 2022-09-23 RX ORDER — DIAZEPAM 5 MG
1 TABLET ORAL
Qty: 10 | Refills: 0
Start: 2022-09-23 | End: 2022-09-27

## 2022-09-23 RX ADMIN — Medication 40 MILLIEQUIVALENT(S): at 12:03

## 2022-09-23 RX ADMIN — Medication 30 MILLIGRAM(S): at 16:35

## 2022-09-23 RX ADMIN — Medication 0.5 MILLIGRAM(S): at 16:11

## 2022-09-23 RX ADMIN — Medication 30 MILLIGRAM(S): at 05:58

## 2022-09-23 RX ADMIN — Medication 4 MILLIGRAM(S): at 06:00

## 2022-09-23 RX ADMIN — Medication 30 MILLIGRAM(S): at 16:13

## 2022-09-23 RX ADMIN — CEFEPIME 100 MILLIGRAM(S): 1 INJECTION, POWDER, FOR SOLUTION INTRAMUSCULAR; INTRAVENOUS at 06:00

## 2022-09-23 RX ADMIN — PANTOPRAZOLE SODIUM 40 MILLIGRAM(S): 20 TABLET, DELAYED RELEASE ORAL at 05:59

## 2022-09-23 RX ADMIN — CEFEPIME 100 MILLIGRAM(S): 1 INJECTION, POWDER, FOR SOLUTION INTRAMUSCULAR; INTRAVENOUS at 13:27

## 2022-09-23 RX ADMIN — Medication 30 MILLIGRAM(S): at 06:20

## 2022-09-23 RX ADMIN — CYCLOBENZAPRINE HYDROCHLORIDE 10 MILLIGRAM(S): 10 TABLET, FILM COATED ORAL at 13:26

## 2022-09-23 NOTE — DISCHARGE NOTE PROVIDER - CARE PROVIDER_API CALL
Austin Patricio)  Neurosurgery  [address] [facility], [address]  [place] [address]  Phone: [phone]  Fax: [phone]  Follow Up Time:     Yo Nam)  Internal Medicine  [address] [place] [address]  Phone: [phone]  Fax: [phone]  Follow Up Time:    Austin Patricio)  Neurosurgery  [address] [facility], [address]  [place] [address]  Phone: [phone]  Fax: [phone]  Follow Up Time:     Yo Nam)  Internal Medicine  [address] [place] [address]  Phone: [phone]  Fax: [phone]  Follow Up Time:     Quirino Carroll)  Plastic Surgery  [address] [facility], [address] [place] [address]  Phone: [phone]  Fax: [phone]  Follow Up Time:     Otf Fowler  INTERNAL MEDICINE  [address] [place] [address]  Phone: [phone]  Fax: [phone]  Follow Up Time:

## 2022-09-23 NOTE — DISCHARGE NOTE NURSING/CASE MANAGEMENT/SOCIAL WORK - NSDCPEEMAIL_GEN_ALL_CORE
[facility] for Tobacco Control email [email or web address]

## 2022-09-23 NOTE — DISCHARGE NOTE NURSING/CASE MANAGEMENT/SOCIAL WORK - NSDCPEFALRISK_GEN_ALL_CORE
For information on Fall & Injury Prevention, visit: [email or web address] OR  [email or web address] OR  https://www.cdc.gov/steadi/patient.html

## 2022-09-23 NOTE — PROGRESS NOTE ADULT - ASSESSMENT
37F PMH recent L5 lami L5/S1 disc discharged 9/15 p/w LBP since 3PM yesterday. No radicular component, pain paraspinal and pulsing in nature. No bowel/urine incontinence. Reports that paresthesias/numbness are stable from last admission.     - Leukocytosis resolved, afebrile, vanc stopped, awaiting susceptibilities   -MR L spine ww/o: post-surgical changes and seroma  -Bld cx: gram negative rods; pseudomonas  -ID plan: Continue Cefepime 2 every 8 hours 9/19-->  - Lower extremity duplex- negative  - ESR= 72  
37y Female with PMHx recent L5-S1 laminectomy (9/13), presenting with back pain since 3PM. Patient was standing up from a chair, when felt sudden onset of sharp stabbing pain in the lower back. Patient has been endorsing numbness and tingling in bilateral lower extremities since the surgery. Prior to the pain, patient was able to ambulating with little issue. Patient unable to move 2/2 pain. Patient took her oxycodone 10 and received 100 mg fentanyl in the ambulance to little relief. Patient denies fever, CP, SOB, abdominal pain, and urinary changes.    fever  -  cultures positive for pseudomonas  - ID consult following  - cefepime and vanco  - poss drainage of seroma by NS    recent L5 lami L5/S1 with back pain  - pt was seen by NS  - pain control with toradol  - muscle relaxants   - MRI of spine with and without contrast done  - cont decadron  - PT eval    constipation  - no bm for 5 days  - senna and miralax  - lactulose x 2    dvt px  - lovenox    
37F PMH recent L5 lami L5/S1 disc discharged 9/15 p/w LBP since 3PM yesterday. No radicular component, pain paraspinal and pulsing in nature. No bowel/urine incontinence. Reports that paresthesias/numbness are stable from last admission. CT L spine showed showed post-op pneumo. WBC still elevated 15.45, afebrile.  Exam: AOx3, b/l UE 5/5 LLE 5/5 RLE hf 2 ke 4- kf 4- df 5 pf 5 (pain limited), numbness/paresthesias to L5, S1, and S2 distribution, patient would not turn to inspect incision 2/2 pain    -MR L spine ww/o   -Febrile overnight; FU fever work-up
37F with  recent L5-S1 laminectomy (9/13), readmitted 9.18 22 with  severe back pain with fever, leukocytosis  MRI demonstrates seroma, inflammatory post operative changes  UA negative  9/19 ESR = 72  Patient states she showered at home after laminectomy applying plastic over incision.  9/18 +BC Pseudo aeruginosa    Suggest  Continue Cefepime 2 every 8 hours 9/19-->  Stop Vanco 9/21 unless new culture results  awaiting susceptibilities  repeat blood cultures  - may require PICC line    discussed with primary attending  
37F with  recent L5-S1 laminectomy (9/13), readmitted 9.18 22 with  severe back pain with fever, leukocytosis  MRI demonstrates seroma, inflammatory post operative changes  UA negative  9/19 ESR = 72  Patient states she showered at home after laminectomy applying plastic over incision.  9/18 +BC Pseudo aeruginosa (Resist Imipenem, Inter Merop - Susc to cipro and all others  9/21 BC x2 NGTD  Leukocytosis, fever promptly resolved  Plastics consult in progress     potential adverse events discussed - adjunctive prebiotics/probiotics discussed    Suggest  Continue Cefepime 2 every 8 hours 9/19--> until discharge  upon discharge:  CIPRO 750 mg po twice daily   through 10/17  local wound care as per Plastics      discussed with primary attending earlier today  patient provided with my contact info  
37y Female with PMHx recent L5-S1 laminectomy (9/13), presenting with back pain since 3PM. Patient was standing up from a chair, when felt sudden onset of sharp stabbing pain in the lower back. Patient has been endorsing numbness and tingling in bilateral lower extremities since the surgery. Prior to the pain, patient was able to ambulating with little issue. Patient unable to move 2/2 pain. Patient took her oxycodone 10 and received 100 mg fentanyl in the ambulance to little relief. Patient denies fever, CP, SOB, abdominal pain, and urinary changes.    fever  - follow up cultures  - I am concerned fever is coming from the spine  - ID consult called    recent L5 lami L5/S1 with back pain  - pt was seen by NS  - pain control with oxy  - muscle relaxants   - MRI of spine with and without contrast  - cont decadron  - PT eval    constipation  - no bm for 5 days  - senna and miralax  - lactulose x 2    dvt px  - lovenox    
37F PMH recent L5 lami L5/S1 disc discharged 9/15 p/w LBP since 3PM yesterday. No radicular component, pain paraspinal and pulsing in nature. No bowel/urine incontinence. Reports that paresthesias/numbness are stable from last admission.     - ID plan: Continue Cefepime 2 every 8 hours 9/19--> until discharge  upon discharge:  CIPRO 750 mg po twice daily through 10/17  - d/c staples 9/23  - set up home PT  - d/c with Valium, PO toradol  - no neurosurgical contraindication to discharge  - Latest blood cultures are negative
37F PMH recent L5 lami L5/S1 disc discharged 9/15 p/w LBP since 3PM yesterday. No radicular component, pain paraspinal and pulsing in nature. No bowel/urine incontinence. Reports that paresthesias/numbness are stable from last admission. CT L spine showed showed post-op pneumo.     -MR L spine ww/o: post-surgical changes and seroma  -Bld cx: gram negative rods  -Lower extremity duplex- negative  - ESR= 72  -Started on abx: Cefepime and vanc  -afebrile overnight
37F with  recent L5-S1 laminectomy (9/13), readmitted 9.18 22 with  severe back pain with fever, leukocytosis  MRI demonstrates seroma, inflammatory post operative changes  UA negative  9/19 ESR = 72  Patient states she showered at home after laminectomy applying plastic over incision.  9/18 +BC Pseudo aeruginosa    Suggest  Continue Cefepime 2 every 8 hours 9/19--> until discharge  upon discharge:  CIPRO 750 mg po twice daily   through 10/17      discussed with primary attending earlier today  
37y Female with PMHx recent L5-S1 laminectomy (9/13), presenting with back pain since 3PM. Patient was standing up from a chair, when felt sudden onset of sharp stabbing pain in the lower back. Patient has been endorsing numbness and tingling in bilateral lower extremities since the surgery. Prior to the pain, patient was able to ambulating with little issue. Patient unable to move 2/2 pain. Patient took her oxycodone 10 and received 100 mg fentanyl in the ambulance to little relief. Patient denies fever, CP, SOB, abdominal pain, and urinary changes.    fever  -  cultures positive for pseudomonas  - ID consult following  - cefepime and vanco  - drainage of seroma is not recommended by neuro surg    recent L5 lami L5/S1 with back pain  - pt was seen by NS  - pain control with toradol  - muscle relaxants   - MRI of spine with and without contrast done  - cont decadron  - PT eval    constipation  - had BM  - cont senna and miralax    dvt px  - lovenox    
37y Female with PMHx recent L5-S1 laminectomy (9/13), presenting with back pain since 3PM. Patient was standing up from a chair, when felt sudden onset of sharp stabbing pain in the lower back. Patient has been endorsing numbness and tingling in bilateral lower extremities since the surgery. Prior to the pain, patient was able to ambulating with little issue. Patient unable to move 2/2 pain. Patient took her oxycodone 10 and received 100 mg fentanyl in the ambulance to little relief. Patient denies fever, CP, SOB, abdominal pain, and urinary changes.    fever  -  cultures positive for pseudomonas  - ID consult following  - cefepime and vanco  - drainage of seroma is not recommended by neuro surg  - follow up repeat cultures and sensitivities    recent L5 lami L5/S1 with back pain  - pt was seen by NS  - pain control with toradol  - muscle relaxants   - MRI of spine with and without contrast done  - cont decadron  - PT eval    constipation  - had BM  - cont senna and miralax    dvt px  - lovenox    
37y Female with PMHx recent L5-S1 laminectomy (9/13), presenting with back pain since 3PM. Patient was standing up from a chair, when felt sudden onset of sharp stabbing pain in the lower back. Patient has been endorsing numbness and tingling in bilateral lower extremities since the surgery. Prior to the pain, patient was able to ambulating with little issue. Patient unable to move 2/2 pain. Patient took her oxycodone 10 and received 100 mg fentanyl in the ambulance to little relief. Patient denies fever, CP, SOB, abdominal pain, and urinary changes.    fever  -  cultures positive for pseudomonas  - ID consult following  - change abx ti cipro 750 bid until 10/17  - drainage of seroma is not recommended by neuro surg  - follow up with NS and ID  - toradol prn pain  - valium for muscle spasm    recent L5 lami L5/S1 with back pain  - pt was seen by NS  - pain control with toradol  - muscle relaxants   - MRI of spine with and without contrast done  - cont decadron  - PT eval    constipation  - had BM  - cont senna and miralax    dvt px  - lovenox

## 2022-09-23 NOTE — PROGRESS NOTE ADULT - SUBJECTIVE AND OBJECTIVE BOX
DATE OF SERVICE: 09-23-22 @ 11:03    Patient is a 37y old  Female who presents with a chief complaint of lower back pain (23 Sep 2022 05:37)      SUBJECTIVE / OVERNIGHT EVENTS:  No chest pain. No shortness of breath. No complaints. No events overnight.     MEDICATIONS  (STANDING):  cefepime   IVPB 2000 milliGRAM(s) IV Intermittent every 8 hours  dexAMETHasone     Tablet 4 milliGRAM(s) Oral every 12 hours  enoxaparin Injectable 40 milliGRAM(s) SubCutaneous every 24 hours  influenza   Vaccine 0.5 milliLiter(s) IntraMuscular once  pantoprazole    Tablet 40 milliGRAM(s) Oral before breakfast  polyethylene glycol 3350 17 Gram(s) Oral two times a day  potassium chloride    Tablet ER 40 milliEquivalent(s) Oral daily  senna 2 Tablet(s) Oral at bedtime    MEDICATIONS  (PRN):  acetaminophen     Tablet .. 650 milliGRAM(s) Oral every 6 hours PRN Mild Pain (1 - 3)  cyclobenzaprine 10 milliGRAM(s) Oral three times a day PRN Muscle Spasm  ketorolac   Injectable 30 milliGRAM(s) IV Push every 6 hours PRN Severe Pain (7 - 10)  oxyCODONE    IR 5 milliGRAM(s) Oral every 4 hours PRN Moderate Pain (4 - 6)      Vital Signs Last 24 Hrs  T(C): 36.7 (23 Sep 2022 07:40), Max: 36.9 (22 Sep 2022 19:10)  T(F): 98.1 (23 Sep 2022 07:40), Max: 98.5 (22 Sep 2022 19:10)  HR: 81 (23 Sep 2022 07:40) (71 - 86)  BP: 128/87 (23 Sep 2022 07:40) (114/74 - 128/87)  BP(mean): --  RR: 18 (23 Sep 2022 07:40) (18 - 18)  SpO2: 99% (23 Sep 2022 07:40) (95% - 99%)    Parameters below as of 23 Sep 2022 07:40  Patient On (Oxygen Delivery Method): room air      CAPILLARY BLOOD GLUCOSE        I&O's Summary    22 Sep 2022 07:01  -  23 Sep 2022 07:00  --------------------------------------------------------  IN: 100 mL / OUT: 0 mL / NET: 100 mL        PHYSICAL EXAM:  GENERAL: NAD, well-developed  HEAD:  Atraumatic, Normocephalic  EYES: EOMI, PERRLA, conjunctiva and sclera clear  NECK: Supple, No JVD  CHEST/LUNG: Clear to auscultation bilaterally; No wheeze  HEART: Regular rate and rhythm; No murmurs, rubs, or gallops  ABDOMEN: Soft, Nontender, Nondistended; Bowel sounds present  EXTREMITIES:  2+ Peripheral Pulses, No clubbing, cyanosis, or edema  PSYCH: AAOx3  NEUROLOGY: non-focal  SKIN: No rashes or lesions    LABS:      Culture - Blood in AM (09.21.22 @ 07:59)   Specimen Source: .Blood Blood-Venous   Culture Results:   No growth to date.   Culture - Blood in AM (09.21.22 @ 07:59)   Specimen Source: .Blood Blood   Culture Results:   No growth to date.   Culture - Blood in AM (09.18.22 @ 22:28)   - Amikacin: S <=16   - Aztreonam: S <=4   - Cefepime: S <=2   - Ceftazidime: S <=1   - Ciprofloxacin: S <=0.25   - Gentamicin: S <=2   - Imipenem: R >8   - Levofloxacin: S <=0.5   - Meropenem: I 4   - Piperacillin/Tazobactam: S <=8   - Tobramycin: S <=2   Gram Stain:   Growth in aerobic bottle: Gram Negative Rods   - Ceftolozane/tazobactam: S <=2   - Ceftazidime/Avibactam: S <=4   - Pseudomonas aeruginosa: Detec   Specimen Source: .Blood Blood-Peripheral   Organism: Blood Culture PCR   Organism: Pseudomonas aeruginosa (Carbapenem Resistant)   Culture Results:   Growth in aerobic bottle: Pseudomonas aeruginosa (Carbapenem Resistant)   ***Blood Panel PCR results on this specimen are available   approximately 3 hours after the Gram stain result.***   Gram stain, PCR, and/or culture results may not always   correspond due to difference in methodologies.   ************************************************************   This PCR assay was performed by multiplex PCR. This   Assay tests for 66 bacterial and resistance gene targets.   Please refer to the [facility] test directory   at [email or web address] for details.   Organism Identification: Blood Culture PCR   Pseudomonas aeruginosa (Carbapenem Resistant)   Method Type: PCR   Method Type: NADEEM   Culture - Blood in AM (09.18.22 @ 22:17)   Specimen Source: .Blood Blood-Peripheral   Culture Results:   No growth to date.               RADIOLOGY & ADDITIONAL TESTS:    Imaging Personally Reviewed:    Consultant(s) Notes Reviewed:      Care Discussed with Consultants/Other Providers:

## 2022-09-23 NOTE — CONSULT NOTE ADULT - ASSESSMENT
37-year-old female s/p L5-S1 laminectomy and discectomy 9/13/22 p/w lower back pain. Plastics consulted to evaluate integrity of midline spinal incision.    Plan:  -  -    DEE  p. [phone]   37-year-old female s/p L5-S1 laminectomy and discectomy 9/13/22 p/w lower back pain. Plastics consulted to evaluate integrity of midline spinal incision.    Plan:  -Dress wound with medihoney and aquacel   -Change dressing every other day  -Follow-up with Dr. Carroll in clinic on Tuesday    PRS  p. [phone]   37-year-old female s/p L5-S1 laminectomy and discectomy 9/13/22 p/w lower back pain. Plastics consulted to evaluate integrity of midline spinal incision.    Plan:  -No surgical intervention at this time.  -Dress wound with medihoney and aquacel.  -Change dressing every other day.  -Follow-up with Dr. Carroll in clinic on Tuesday.  -Clear to discharge from PRS perspective.    PRS  p. [phone]

## 2022-09-23 NOTE — PROGRESS NOTE ADULT - PROVIDER SPECIALTY LIST ADULT
Internal Medicine
Internal Medicine
Infectious Disease
Infectious Disease
Internal Medicine
Neurosurgery
Infectious Disease
Neurosurgery

## 2022-09-23 NOTE — DISCHARGE NOTE PROVIDER - HOSPITAL COURSE
37y Female with PMHx recent L5-S1 laminectomy (9/13), presenting with back pain since 3PM. Patient was standing up from a chair, when felt sudden onset of sharp stabbing pain in the lower back. Patient has been endorsing numbness and tingling in bilateral lower extremities since the surgery. Prior to the pain, patient was able to ambulating with little issue. Patient unable to move 2/2 pain. Patient took her oxycodone 10 and received 100 mg fentanyl in the ambulance to little relief. Patient denies fever, CP, SOB, abdominal pain, and urinary changes.    fever  -  cultures positive for pseudomonas  - ID consult following  - change abx ti cipro 750 bid until 10/17  - drainage of seroma is not recommended by neuro surg  - follow up with NS and ID  - toradol prn pain  - valium for muscle spasm    recent L5 lami L5/S1 with back pain  - pt was seen by NS  - pain control with toradol  - muscle relaxants   - MRI of spine with and without contrast done  - cont decadron  - PT eval- home with home PT     constipation  - had BM  - cont senna and miralax    dvt px  Pt is medically stable for discharge and to follow up with NeuroSx, ID, and PCP    37y Female with PMHx recent L5-S1 laminectomy (9/13), presenting with back pain since 3PM. Patient was standing up from a chair, when felt sudden onset of sharp stabbing pain in the lower back. Patient has been endorsing numbness and tingling in bilateral lower extremities since the surgery. Prior to the pain, patient was able to ambulating with little issue. Patient unable to move 2/2 pain. Patient took her oxycodone 10 and received 100 mg fentanyl in the ambulance to little relief. Patient denies fever, CP, SOB, abdominal pain, and urinary changes.    fever  -  cultures positive for pseudomonas  - ID consult following  - change abx ti cipro 750 bid until 10/17  - drainage of seroma is not recommended by neuro surg  - follow up with NS and ID  - toradol prn pain  - valium for muscle spasm    recent L5 lami L5/S1 with back pain  - pt was seen by NS  - pain control with toradol  - muscle relaxants   - MRI of spine with and without contrast done  - cont decadron  - PT eval- home with home PT   -Plastics consulted for integrity of incision . No surgical intervention at this time.  -Dress wound with medihoney and aquacel.  -Change dressing every other day.  -Follow-up with Dr. Carroll in clinic on Tuesday.    constipation  - had BM  - cont senna and miralax    dvt px  Pt is medically stable for discharge and to follow up with NeuroSx, ID,Plastics,  and PCP    37y Female with PMHx recent L5-S1 laminectomy (9/13), presenting with back pain since 3PM. Patient was standing up from a chair, when felt sudden onset of sharp stabbing pain in the lower back. Patient has been endorsing numbness and tingling in bilateral lower extremities since the surgery. Prior to the pain, patient was able to ambulating with little issue. Patient unable to move 2/2 pain. Patient took her oxycodone 10 and received 100 mg fentanyl in the ambulance to little relief. Patient denies fever, CP, SOB, abdominal pain, and urinary changes.    fever  -  cultures positive for pseudomonas  - ID consult following  - change abx to cipro 750 bid until 10/17  - drainage of seroma is not recommended by neuro surg  - follow up with NS and ID  - toradol prn pain  - valium for muscle spasm    recent L5 lami L5/S1 with back pain  - pt was seen by NS  - pain control with toradol  - muscle relaxants   - MRI of spine with and without contrast done  - cont decadron  - PT eval- home with home PT   -Plastics consulted for integrity of incision . No surgical intervention at this time.  -Dress wound with medihoney and aquacel.  -Change dressing every other day.  -Follow-up with Dr. Carroll in clinic on Tuesday.    constipation  - had BM  - cont senna and miralax    dvt px  Pt is medically stable for discharge and to follow up with NeuroSx, ID,Plastics,  and PCP

## 2022-09-23 NOTE — DISCHARGE NOTE NURSING/CASE MANAGEMENT/SOCIAL WORK - NSDCPEWEB_GEN_ALL_CORE
[facility] for Tobacco [facility] website --- [email or web address] website --- [email or web address]

## 2022-09-23 NOTE — CHART NOTE - NSCHARTNOTEFT_GEN_A_CORE
Neurosurgery will ask plastic surgery to eval posterior wound prior to DC.
Wound Care Team Note:    Request for wound care consult for neurosurgical wound received. Spoke with Neurosurgical team to whom we defer management. Please refer any questions/concerns to Neurosurgery.    Will not follow.    Shaniqua Frank NP-C, CWOCN [id number]
not seen today  leukocytosis resolved  afebrile overnight  no new cultures  Vancomycin discontinued    await susceptibilties  follow up blood cultures

## 2022-09-23 NOTE — CONSULT NOTE ADULT - SUBJECTIVE AND OBJECTIVE BOX
CC: Concern for incision dehiscence    HPI: 37-year-old female s/p L5-S1 laminectomy and discectomy 9/13/22 p/w lower back pain. Plastics consulted to evaluate integrity of midline spinal incision.      PMHx:     Lumbar herniated disc      PSHx:     S/P laminectomy and discectomy 9/13/22      Medications (inpatient): cefepime   IVPB 2000 milliGRAM(s) IV Intermittent every 8 hours  dexAMETHasone     Tablet 4 milliGRAM(s) Oral every 12 hours  enoxaparin Injectable 40 milliGRAM(s) SubCutaneous every 24 hours  influenza   Vaccine 0.5 milliLiter(s) IntraMuscular once  pantoprazole    Tablet 40 milliGRAM(s) Oral before breakfast  polyethylene glycol 3350 17 Gram(s) Oral two times a day  potassium chloride    Tablet ER 40 milliEquivalent(s) Oral daily  senna 2 Tablet(s) Oral at bedtime    Medications (PRN):acetaminophen     Tablet .. 650 milliGRAM(s) Oral every 6 hours PRN  cyclobenzaprine 10 milliGRAM(s) Oral three times a day PRN  ketorolac   Injectable 30 milliGRAM(s) IV Push every 6 hours PRN  oxyCODONE    IR 5 milliGRAM(s) Oral every 4 hours PRN    Allergies: No Known Allergies      Physical Exam    T(C): 36.7  HR: 81 (71 - 86)  BP: 128/87 (114/74 - 128/87)  RR: 18 (18 - 18)  SpO2: 99% (95% - 99%)  Tmax: T(C): , Max: 36.9 (09-22-22 @ 19:10)    09-22-22  -  09-23-22  --------------------------------------------------------  IN:    IV PiggyBack: 100 mL  Total IN: 100 mL    OUT:  Total OUT: 0 mL    Total NET: 100 mL      General: well developed, well nourished, NAD  Back: minimal superficial dehiscence of spinal incision. 1cc of serous fluid expressed. No pus. No fluid wave  Neuro: alert and oriented, no focal deficits, moves all extremities spontaneously  HEENT: NCAT, EOMI, anicteric, mucosa moist  Respiratory: airway patent, respirations unlabored  CVS: pulse present  Abdomen: soft, nontender, nondistended  Extremities: no edema, sensation and movement grossly intact  Skin: warm, dry, appropriate color    Labs:                    Imaging and other studies: CC: Concern for incision dehiscence    HPI: 37-year-old female s/p L5-S1 laminectomy and discectomy 9/13/22 p/w lower back pain. Plastics consulted to evaluate integrity of midline spinal incision.      PMHx:     Lumbar herniated disc      PSHx:     S/P laminectomy and discectomy 9/13/22      Medications (inpatient): cefepime   IVPB 2000 milliGRAM(s) IV Intermittent every 8 hours  dexAMETHasone     Tablet 4 milliGRAM(s) Oral every 12 hours  enoxaparin Injectable 40 milliGRAM(s) SubCutaneous every 24 hours  influenza   Vaccine 0.5 milliLiter(s) IntraMuscular once  pantoprazole    Tablet 40 milliGRAM(s) Oral before breakfast  polyethylene glycol 3350 17 Gram(s) Oral two times a day  potassium chloride    Tablet ER 40 milliEquivalent(s) Oral daily  senna 2 Tablet(s) Oral at bedtime    Medications (PRN):acetaminophen     Tablet .. 650 milliGRAM(s) Oral every 6 hours PRN  cyclobenzaprine 10 milliGRAM(s) Oral three times a day PRN  ketorolac   Injectable 30 milliGRAM(s) IV Push every 6 hours PRN  oxyCODONE    IR 5 milliGRAM(s) Oral every 4 hours PRN    Allergies: No Known Allergies      Physical Exam    T(C): 36.7  HR: 81 (71 - 86)  BP: 128/87 (114/74 - 128/87)  RR: 18 (18 - 18)  SpO2: 99% (95% - 99%)  Tmax: T(C): , Max: 36.9 (09-22-22 @ 19:10)    09-22-22  -  09-23-22  --------------------------------------------------------  IN:    IV PiggyBack: 100 mL  Total IN: 100 mL    OUT:  Total OUT: 0 mL    Total NET: 100 mL      General: well developed, well nourished, NAD  Back: minimal superficial dehiscence of spinal incision. 1cc of serous fluid expressed. No pus. No fluid wave  Neuro: alert and oriented, no focal deficits, moves all extremities spontaneously  HEENT: NCAT, EOMI, anicteric, mucosa moist  Respiratory: airway patent, respirations unlabored  CVS: pulse present  Abdomen: soft, nontender, nondistended  Extremities: no edema, sensation and movement grossly intact  Skin: warm, dry, appropriate color

## 2022-09-23 NOTE — DISCHARGE NOTE PROVIDER - CARE PROVIDERS DIRECT ADDRESSES
,[email or web address] ,[email or web address] the [email or web address]

## 2022-09-23 NOTE — PROGRESS NOTE ADULT - REASON FOR ADMISSION
lower back pain
lower back pain  Positive bld cultures
lower back pain

## 2022-09-23 NOTE — DISCHARGE NOTE NURSING/CASE MANAGEMENT/SOCIAL WORK - PATIENT PORTAL LINK FT
You can access the FollowMyHealth Patient Portal offered by [facility] by registering at the following website: [email or web address]. By joining [email or web address]’s FollowMyHealth portal, you will also be able to view your health information using other applications (apps) compatible with our system.

## 2022-09-23 NOTE — DISCHARGE NOTE PROVIDER - NSDCCPCAREPLAN_GEN_ALL_CORE_FT
PRINCIPAL DISCHARGE DIAGNOSIS  Diagnosis: Back pain  Assessment and Plan of Treatment: You recenlty had surgery (Laminectomy) . You were seen by Neurosurgery. There is no further surgical intervention at this time. You are to complete your course of antibiotics as prescribe and follow up with the Neuros      SECONDARY DISCHARGE DIAGNOSES  Diagnosis: Pseudomonal bacteremia  Assessment and Plan of Treatment: MRI demonstrates seroma, inflammatory post operative changes  UA negative  Per recommendations of Infectious diseases: upon discharge:  CIPRO 750 mg po (oral )  twice daily   through 10/17     PRINCIPAL DISCHARGE DIAGNOSIS  Diagnosis: Back pain  Assessment and Plan of Treatment: You recenlty had surgery (Laminectomy) . You were seen by Neurosurgery. There is no further surgical intervention at this time. You are to complete your course of antibiotics as prescribe and follow up with the Neurosurgery.         SECONDARY DISCHARGE DIAGNOSES  Diagnosis: Pseudomonal bacteremia  Assessment and Plan of Treatment: MRI demonstrates seroma, inflammatory post operative changes  UA negative  Per recommendations of Infectious diseases: upon discharge:  CIPRO 750 mg po (oral )  twice daily   through 10/17    Diagnosis: Encounter for postoperative wound care  Assessment and Plan of Treatment: Please follow up with Dr. Tavarez from Plastics on Tuesday to follow up  with your incison dressing.   Recommend: Dress wound with medihoney and aquacel.  -Change dressing every other day.       PRINCIPAL DISCHARGE DIAGNOSIS  Diagnosis: Back pain  Assessment and Plan of Treatment: You recenlty had surgery (Laminectomy) . You were seen by Neurosurgery. There is no further surgical intervention at this time. You are to complete your course of antibiotics as prescribe and follow up with the Neurosurgery.         SECONDARY DISCHARGE DIAGNOSES  Diagnosis: Pseudomonal bacteremia  Assessment and Plan of Treatment: MRI demonstrates seroma, inflammatory post operative changes  UA negative  Per recommendations of Infectious diseases: upon discharge:  CIPRO 750 mg po (oral )  twice daily   through 10/17    Diagnosis: Encounter for postoperative wound care  Assessment and Plan of Treatment: Please follow up with Dr. Tavarez from Plastics on Tuesday to follow up  with your incison dressing.   Recommend: Dress wound with medihoney and aquacel.  -Change dressing every other day.  You may take a shower, but please cover dressing with tegaderm, do not let water  saturate  dressing

## 2022-09-23 NOTE — DISCHARGE NOTE PROVIDER - NSDCMRMEDTOKEN_GEN_ALL_CORE_FT
acetaminophen 325 mg oral tablet: 2 tab(s) orally every 6 hours, As needed, Mild Pain (1 - 3)  cyclobenzaprine 10 mg oral tablet: 1 tab(s) orally , As Needed  dexamethasone 4 mg oral tablet: 1 tab(s) orally every 8 hours today  1 tab q12 until 9/17/22 then 1 tab daily am until 9/18/22 &amp; stop     NOTE: none taken on 9/18/22 (x3 tablets remaining)  ketorolac 10 mg oral tablet: 1 tab(s) orally 4 times a day MDD:4 tabs  linaclotide 145 mcg oral capsule: 1 cap(s) orally once a day  oxyCODONE 10 mg oral tablet: 1 tab(s) orally every 6 to 8 hours, As Needed -Severe Pain (7 - 10)  pantoprazole 40 mg oral delayed release tablet: 1 tab(s) orally once a day  polyethylene glycol 3350 oral powder for reconstitution: 17 gram(s) orally 2 times a day  senna leaf extract oral tablet: 2 tab(s) orally once a day (at bedtime)  Tylenol 500 mg oral tablet: 2 tab(s) orally , As Needed  Valium 5 mg oral tablet: 1 tab(s) orally every 12 hours MDD:2 tabs  Xanax 0.5 mg oral tablet: 1 tab(s) orally , As Needed   acetaminophen 325 mg oral tablet: 2 tab(s) orally every 6 hours, As needed, Mild Pain (1 - 3)  ciprofloxacin 750 mg oral tablet: 1 tab(s) orally 2 times a day until 10/17/22   cyclobenzaprine 10 mg oral tablet: 1 tab(s) orally , As Needed  ketorolac 10 mg oral tablet: 1 tab(s) orally 4 times a day MDD:4 tabs  linaclotide 145 mcg oral capsule: 1 cap(s) orally once a day  oxyCODONE 10 mg oral tablet: 1 tab(s) orally every 6 to 8 hours, As Needed -Severe Pain (7 - 10)  pantoprazole 40 mg oral delayed release tablet: 1 tab(s) orally once a day  polyethylene glycol 3350 oral powder for reconstitution: 17 gram(s) orally 2 times a day  senna leaf extract oral tablet: 2 tab(s) orally once a day (at bedtime)  Tylenol 500 mg oral tablet: 2 tab(s) orally , As Needed  Valium 5 mg oral tablet: 1 tab(s) orally every 12 hours MDD:2 tabs  Xanax 0.5 mg oral tablet: 1 tab(s) orally , As Needed

## 2022-09-23 NOTE — CONSULT NOTE ADULT - CONSULT REASON
fever, leukocytosis
Assessment of spinal incision
LBP in setting of recent L5 lami and L5/S1 discectomy

## 2022-09-23 NOTE — DISCHARGE NOTE PROVIDER - PROVIDER TOKENS
PROVIDER:[TOKEN:[[id number]]],PROVIDER:[TOKEN:[[id number]]] PROVIDER:[TOKEN:[[id number]]],PROVIDER:[TOKEN:[[id number]]],PROVIDER:[TOKEN:[[id number]]],PROVIDER:[TOKEN:[[id number]]]

## 2022-09-23 NOTE — PROGRESS NOTE ADULT - SUBJECTIVE AND OBJECTIVE BOX
Patient seen and examined at bedside.    --Anticoagulation--  enoxaparin Injectable 40 milliGRAM(s) SubCutaneous every 24 hours    T(C): 36.9 (09-22-22 @ 22:59), Max: 36.9 (09-22-22 @ 19:10)  HR: 71 (09-22-22 @ 22:59) (71 - 86)  BP: 121/76 (09-22-22 @ 22:59) (107/71 - 121/76)  RR: 18 (09-22-22 @ 22:59) (18 - 18)  SpO2: 95% (09-22-22 @ 22:59) (95% - 97%)  Wt(kg): --    Exam: AOx3, b/l UE 5/5 LLE 5/5 RLE 5/5, still has some numbness along LLE

## 2022-09-23 NOTE — PROGRESS NOTE ADULT - SUBJECTIVE AND OBJECTIVE BOX
Follow Up:  Pseudomonas bacteremia    Interval History/ROS: feels better - back pain worse when changing positions,  ambulating    Allergies  No Known Allergies    ANTIMICROBIALS:  cefepime   IVPB 2000 every 8 hours      OTHER MEDS:  MEDICATIONS  (STANDING):  acetaminophen     Tablet .. 650 every 6 hours PRN  cyclobenzaprine 10 three times a day PRN  dexAMETHasone     Tablet 4 every 12 hours  enoxaparin Injectable 40 every 24 hours  influenza   Vaccine 0.5 once  ketorolac   Injectable 30 every 6 hours PRN  oxyCODONE    IR 5 every 4 hours PRN  pantoprazole    Tablet 40 before breakfast  polyethylene glycol 3350 17 two times a day  senna 2 at bedtime      Vital Signs Last 24 Hrs  T(C): 36.7 (23 Sep 2022 07:40), Max: 36.9 (22 Sep 2022 19:10)  T(F): 98.1 (23 Sep 2022 07:40), Max: 98.5 (22 Sep 2022 19:10)  HR: 81 (23 Sep 2022 07:40) (71 - 86)  BP: 128/87 (23 Sep 2022 07:40) (114/74 - 128/87)  BP(mean): --  RR: 18 (23 Sep 2022 07:40) (18 - 18)  SpO2: 99% (23 Sep 2022 07:40) (95% - 99%)    Parameters below as of 23 Sep 2022 07:40  Patient On (Oxygen Delivery Method): room air    PHYSICAL EXAM:  General: WN/WD NAD, Non-toxic  Neurology: A&Ox3, nonfocal  Respiratory: Clear to auscultation bilaterally  CV: RRR, S1S2, no murmurs, rubs or gallops  Abdominal: Soft, Non-tender, non-distended, normal bowel sounds  Extremities: No edema, + peripheral pulses  Line Sites: Clear  Skin: No rash, dressing in place over lower back incision    WBC Count: 9.98 (09-21 @ 07:22)  WBC Count: 14.64 (09-19 @ 15:39)      Creatinine: 0.71 (09-21 @ 07:25)    Creatinine: 0.71 (09-19 @ 16:48)        MICROBIOLOGY:  .Blood Blood  09-21-22   No growth to date.  --  --      .Blood Blood-Peripheral  09-18-22   Growth in aerobic bottle: Pseudomonas aeruginosa (Carbapenem Resistant)  (09.18.22 @ 22:28)   - Ceftolozane/tazobactam: S <=2   - Ceftazidime/Avibactam: S <=4   - Amikacin: S <=16   - Aztreonam: S <=4   - Cefepime: S <=2   - Ceftazidime: S <=1   - Ciprofloxacin: S <=0.25   - Gentamicin: S <=2   - Imipenem: R >8   - Levofloxacin: S <=0.5   - Meropenem: I 4   - Piperacillin/Tazobactam: S <=8   - Tobramycin: S <=2       .Blood Blood-Peripheral  09-18-22   No growth to date.  --  --    RADIOLOGY:    Yo Nam MD; Division of Infectious Disease; Pager: [phone]; nights and weekends: [phone]

## 2022-09-24 ENCOUNTER — NON-APPOINTMENT (OUTPATIENT)
Age: 37
End: 2022-09-24

## 2022-09-24 LAB
CULTURE RESULTS: SIGNIFICANT CHANGE UP
SPECIMEN SOURCE: SIGNIFICANT CHANGE UP

## 2022-09-26 ENCOUNTER — TRANSCRIPTION ENCOUNTER (OUTPATIENT)
Age: 37
End: 2022-09-26

## 2022-09-26 LAB
CULTURE RESULTS: SIGNIFICANT CHANGE UP
CULTURE RESULTS: SIGNIFICANT CHANGE UP
SPECIMEN SOURCE: SIGNIFICANT CHANGE UP
SPECIMEN SOURCE: SIGNIFICANT CHANGE UP

## 2022-09-27 ENCOUNTER — APPOINTMENT (OUTPATIENT)
Dept: PLASTIC SURGERY | Facility: CLINIC | Age: 37
End: 2022-09-27
Payer: COMMERCIAL

## 2022-09-27 ENCOUNTER — NON-APPOINTMENT (OUTPATIENT)
Age: 37
End: 2022-09-27

## 2022-09-27 VITALS
BODY MASS INDEX: 35.36 KG/M2 | HEIGHT: 66 IN | SYSTOLIC BLOOD PRESSURE: 119 MMHG | HEART RATE: 109 BPM | WEIGHT: 220 LBS | TEMPERATURE: 97.9 F | DIASTOLIC BLOOD PRESSURE: 79 MMHG | OXYGEN SATURATION: 97 %

## 2022-09-27 PROCEDURE — 99024 POSTOP FOLLOW-UP VISIT: CPT

## 2022-09-28 ENCOUNTER — NON-APPOINTMENT (OUTPATIENT)
Age: 37
End: 2022-09-28

## 2022-09-29 ENCOUNTER — TRANSCRIPTION ENCOUNTER (OUTPATIENT)
Age: 37
End: 2022-09-29

## 2022-09-30 ENCOUNTER — NON-APPOINTMENT (OUTPATIENT)
Age: 37
End: 2022-09-30

## 2022-09-30 ENCOUNTER — TRANSCRIPTION ENCOUNTER (OUTPATIENT)
Age: 37
End: 2022-09-30

## 2022-10-01 ENCOUNTER — TRANSCRIPTION ENCOUNTER (OUTPATIENT)
Age: 37
End: 2022-10-01

## 2022-10-03 ENCOUNTER — NON-APPOINTMENT (OUTPATIENT)
Age: 37
End: 2022-10-03

## 2022-10-03 LAB
ALBUMIN SERPL ELPH-MCNC: 4.1 G/DL
ALP BLD-CCNC: 63 U/L
ALT SERPL-CCNC: 26 U/L
ANION GAP SERPL CALC-SCNC: 15 MMOL/L
AST SERPL-CCNC: 15 U/L
BASOPHILS # BLD AUTO: 0.06 K/UL
BASOPHILS NFR BLD AUTO: 0.6 %
BILIRUB SERPL-MCNC: 0.2 MG/DL
BUN SERPL-MCNC: 12 MG/DL
CALCIUM SERPL-MCNC: 9.4 MG/DL
CHLORIDE SERPL-SCNC: 98 MMOL/L
CO2 SERPL-SCNC: 23 MMOL/L
CREAT SERPL-MCNC: 0.79 MG/DL
CRP SERPL-MCNC: 19 MG/L
EGFR: 100 ML/MIN/1.73M2
EOSINOPHIL # BLD AUTO: 0.2 K/UL
EOSINOPHIL NFR BLD AUTO: 1.9 %
ERYTHROCYTE [SEDIMENTATION RATE] IN BLOOD BY WESTERGREN METHOD: 44 MM/HR
GLUCOSE SERPL-MCNC: 123 MG/DL
HCT VFR BLD CALC: 36.6 %
HGB BLD-MCNC: 11.8 G/DL
IMM GRANULOCYTES NFR BLD AUTO: 0.8 %
LYMPHOCYTES # BLD AUTO: 3.27 K/UL
LYMPHOCYTES NFR BLD AUTO: 30.7 %
MAN DIFF?: NORMAL
MCHC RBC-ENTMCNC: 28.6 PG
MCHC RBC-ENTMCNC: 32.2 GM/DL
MCV RBC AUTO: 88.8 FL
MONOCYTES # BLD AUTO: 0.74 K/UL
MONOCYTES NFR BLD AUTO: 6.9 %
NEUTROPHILS # BLD AUTO: 6.29 K/UL
NEUTROPHILS NFR BLD AUTO: 59.1 %
PLATELET # BLD AUTO: 299 K/UL
POTASSIUM SERPL-SCNC: 4.5 MMOL/L
PROT SERPL-MCNC: 6.6 G/DL
RBC # BLD: 4.12 M/UL
RBC # FLD: 12.6 %
SODIUM SERPL-SCNC: 136 MMOL/L
WBC # FLD AUTO: 10.65 K/UL

## 2022-10-04 ENCOUNTER — APPOINTMENT (OUTPATIENT)
Dept: PLASTIC SURGERY | Facility: CLINIC | Age: 37
End: 2022-10-04

## 2022-10-04 VITALS
HEIGHT: 66 IN | WEIGHT: 220 LBS | BODY MASS INDEX: 35.36 KG/M2 | SYSTOLIC BLOOD PRESSURE: 131 MMHG | OXYGEN SATURATION: 97 % | TEMPERATURE: 98 F | HEART RATE: 90 BPM | DIASTOLIC BLOOD PRESSURE: 88 MMHG

## 2022-10-04 PROCEDURE — 99024 POSTOP FOLLOW-UP VISIT: CPT

## 2022-10-06 ENCOUNTER — TRANSCRIPTION ENCOUNTER (OUTPATIENT)
Age: 37
End: 2022-10-06

## 2022-10-06 ENCOUNTER — NON-APPOINTMENT (OUTPATIENT)
Age: 37
End: 2022-10-06

## 2022-10-07 ENCOUNTER — APPOINTMENT (OUTPATIENT)
Dept: NEUROSURGERY | Facility: CLINIC | Age: 37
End: 2022-10-07

## 2022-10-07 ENCOUNTER — TRANSCRIPTION ENCOUNTER (OUTPATIENT)
Age: 37
End: 2022-10-07

## 2022-10-07 ENCOUNTER — APPOINTMENT (OUTPATIENT)
Dept: SPINE | Facility: CLINIC | Age: 37
End: 2022-10-07

## 2022-10-07 VITALS
DIASTOLIC BLOOD PRESSURE: 76 MMHG | HEART RATE: 123 BPM | BODY MASS INDEX: 35.36 KG/M2 | WEIGHT: 220 LBS | OXYGEN SATURATION: 96 % | SYSTOLIC BLOOD PRESSURE: 120 MMHG | HEIGHT: 66 IN

## 2022-10-07 PROCEDURE — 99024 POSTOP FOLLOW-UP VISIT: CPT

## 2022-10-07 NOTE — ASSESSMENT
[FreeTextEntry1] : IMPRESSION:\par 37 year old female with LBP, radiculopathy, paresthesias, L5/S1 herniated disc s/p L5/S1 laminectomy and discectomy on 9/13/22.  Reports significant improvement in pain, does have slight incisional pain, pulsating sensation, continues to have numbness in groin area, bilateral buttocks, posterior thighs, calves, lateral aspect of both feet. Incision healing well, no active drainage. Has been following up with plastic surgery for management of wound. Using a cane to ambulate. \par \par \par PLAN:\par - continue antibiotic per ID\par - continue to follow up with plastics \par - Encouraged good incision care, daily incision wash\par - Return to clinic in 3-4 weeks \par \par

## 2022-10-07 NOTE — REASON FOR VISIT
[de-identified] : L5/S1 laminectomy and discectomy  [de-identified] : 9/13/22 [de-identified] : 26

## 2022-10-07 NOTE — PHYSICAL EXAM
[General Appearance - Alert] : alert [General Appearance - In No Acute Distress] : in no acute distress [Clean] : clean [Dry] : dry [Healing Well] : healing well [Intact] : intact [No Drainage] : without drainage [Normal Skin] : normal [Oriented To Time, Place, And Person] : oriented to person, place, and time [Affect] : the affect was normal [Person] : oriented to person [Place] : oriented to place [Time] : oriented to time [Cranial Nerves Optic (II)] : visual acuity intact bilaterally,  pupils equal round and reactive to light [Cranial Nerves Oculomotor (III)] : extraocular motion intact [Cranial Nerves Trigeminal (V)] : facial sensation intact symmetrically [Cranial Nerves Facial (VII)] : face symmetrical [Cranial Nerves Vestibulocochlear (VIII)] : hearing was intact bilaterally [Cranial Nerves Glossopharyngeal (IX)] : tongue and palate midline [Cranial Nerves Accessory (XI - Cranial And Spinal)] : head turning and shoulder shrug symmetric [Cranial Nerves Hypoglossal (XII)] : there was no tongue deviation with protrusion [Motor Tone] : muscle tone was normal in all four extremities [Motor Strength] : muscle strength was normal in all four extremities [Involuntary Movements] : no involuntary movements were seen [Sensation Tactile Decrease] : light touch was intact [2+] : Ankle jerk left 2+ [Sclera] : the sclera and conjunctiva were normal [Extraocular Movements] : extraocular movements were intact [Outer Ear] : the ears and nose were normal in appearance [Hearing Threshold Finger Rub Not [place]] : hearing was normal [Neck Appearance] : the appearance of the neck was normal [] : no respiratory distress [Exaggerated Use Of Accessory Muscles For Inspiration] : no accessory muscle use [Edema] : there was no peripheral edema [Abnormal Walk] : normal gait [Skin Color & Pigmentation] : normal skin color and pigmentation [Erythema] : not erythematous [Tender] : not tender [Warm] : not warm [Indurated] : not indurated [FreeTextEntry1] : using a cane to ambulate

## 2022-10-07 NOTE — HISTORY OF PRESENT ILLNESS
[FreeTextEntry1] : Patient is a 37 year old female with history of chronic lower back pain who was admitted with worsening LBP and radiculopathy down to LLE associated with numbness/paresthesias bilateral thighs, groin, perineal area and constipation. MRI showed central/left disc herniation at L5/S1 with what appeared to be fragment extending down behind body of S1. Patient underwent L5-S1 laminectomy and discectomy on 9/13/22. Postop course complicated by constipation, leukocytosis likely from steroid. She was discharge home on 9/15/22.\par \par Patient represented to the ED on 9/18/22 with lower back pain, no radicular component, paraspinal pain and pulsating in nature. No bowel or bladder incontinence. Had stable paresthesia and numbness. CT L spine showed showed post-op pneumo. Elevated WBC to 15. Patient admitted to medicine for pain control. MRI lumbar spine w/wo with seroma, inflammatory postop changes. Per neurosurgery team, no drainage of seroma indicated. Had positive blood culture. Seen and evaluated by ID, continued on IV antibiotics inpatient and oral Cipro 750 bid till 10/17 on discharge. \par \par Patient her today for postop follow up, reports significant improvement in pain, does have slight incisional pain, pulsating sensation, continues to have numbness in groin area, bilateral buttocks, posterior thighs, calves, lateral aspect of both feet. Incision healing well, no active drainage. Has been following up with plastic surgery for management of wound. Using a cane to ambulate. \par \par \par \par

## 2022-10-10 ENCOUNTER — TRANSCRIPTION ENCOUNTER (OUTPATIENT)
Age: 37
End: 2022-10-10

## 2022-10-10 ENCOUNTER — APPOINTMENT (OUTPATIENT)
Dept: SPINE | Facility: CLINIC | Age: 37
End: 2022-10-10

## 2022-10-10 VITALS
HEIGHT: 66 IN | OXYGEN SATURATION: 93 % | SYSTOLIC BLOOD PRESSURE: 122 MMHG | WEIGHT: 220 LBS | BODY MASS INDEX: 35.36 KG/M2 | HEART RATE: 109 BPM | DIASTOLIC BLOOD PRESSURE: 84 MMHG

## 2022-10-10 PROCEDURE — 99024 POSTOP FOLLOW-UP VISIT: CPT

## 2022-10-10 NOTE — REASON FOR VISIT
[Family Member] : family member [de-identified] : L5/S1 laminectomy and discectomy  [de-identified] : 9/13/22 [de-identified] : 26

## 2022-10-10 NOTE — ASSESSMENT
[FreeTextEntry1] : IMPRESSION:\par 37 year old female with LBP, radiculopathy, paresthesias, L5/S1 herniated disc s/p L5/S1 laminectomy and discectomy on 9/13/22.  Reports significant improvement in pain, does have slight incisional pain, pulsating sensation, continues to have numbness in groin area, bilateral buttocks, posterior thighs, calves, lateral aspect of both feet. Incision healing well, no active drainage. Has been following up with plastic surgery for management of wound. Using a cane to ambulate. \par Sharp pain in mid back of incision with pulsating sensation after straining to have bm two days ago.\par \par PLAN:\par -mri lumbar spine non con now\par - follow up with gi for constipation\par -encourage weaning off the oxy \par - ok to continue flexiril\par - continue antibiotic per ID\par - continue to follow up with plastics \par - Encouraged good incision care, daily incision wash\par - Return to clinic in 6 weeks \par \par

## 2022-10-10 NOTE — REVIEW OF SYSTEMS
[As Noted in HPI] : as noted in HPI [Negative] : Heme/Lymph [Numbness] : numbness [Difficulty Walking] : difficulty walking

## 2022-10-10 NOTE — HISTORY OF PRESENT ILLNESS
[FreeTextEntry1] : Patient is a 37 year old female with history of chronic lower back pain who was admitted with worsening LBP and radiculopathy down to LLE associated with numbness/paresthesias bilateral thighs, groin, perineal area and constipation. MRI showed central/left disc herniation at L5/S1 with what appeared to be fragment extending down behind body of S1. Patient underwent L5-S1 laminectomy and discectomy on 9/13/22. Postop course complicated by constipation, leukocytosis likely from steroid. She was discharge home on 9/15/22.\par \par Patient represented to the ED on 9/18/22 with lower back pain, no radicular component, paraspinal pain and pulsating in nature. No bowel or bladder incontinence. Had stable paresthesia and numbness. CT L spine showed showed post-op pneumo. Elevated WBC to 15. Patient admitted to medicine for pain control. MRI lumbar spine w/wo with seroma, inflammatory postop changes. Per neurosurgery team, no drainage of seroma indicated. Had positive blood culture. Seen and evaluated by ID, continued on IV antibiotics inpatient and oral Cipro 750 bid till 10/17 on discharge. \par \par Patient her today for postop follow up, reports significant improvement in pain, does have slight incisional pain, pulsating sensation, continues to have numbness in groin area, bilateral buttocks, posterior thighs, calves, lateral aspect of both feet. Incision healing well, no active drainage. Has been following up with plastic surgery for management of wound. Using a cane to ambulate. \par \par Today she presents with chief complaint of sharp pain in mid back incision which pulsates after having to strain for bowel movement 2days ago. Surgical incision clean and dry with some superficial scabbing. \par \par

## 2022-10-10 NOTE — PHYSICAL EXAM
[General Appearance - Alert] : alert [General Appearance - In No Acute Distress] : in no acute distress [Clean] : clean [Dry] : dry [Healing Well] : healing well [Intact] : intact [No Drainage] : without drainage [Normal Skin] : normal [Oriented To Time, Place, And Person] : oriented to person, place, and time [Affect] : the affect was normal [Person] : oriented to person [Place] : oriented to place [Time] : oriented to time [Motor Strength] : muscle strength was normal in all four extremities [Involuntary Movements] : no involuntary movements were seen [Sensation Tactile Decrease] : light touch was intact [2+] : Ankle jerk left 2+ [Extraocular Movements] : extraocular movements were intact [Hearing Threshold Finger Rub Not [place]] : hearing was normal [Exaggerated Use Of Accessory Muscles For Inspiration] : no accessory muscle use [Edema] : there was no peripheral edema [Abnormal Walk] : normal gait [Skin Color & Pigmentation] : normal skin color and pigmentation [Erythema] : not erythematous [Tender] : not tender [Warm] : not warm [Indurated] : not indurated [FreeTextEntry1] : using a cane to ambulate

## 2022-10-10 NOTE — REASON FOR VISIT
[Post Op: _________] : a [unfilled] post op visit [Parent] : parent [FreeTextEntry1] : s/p lumbar laminectomy with discectomy and closure DOS: 9/13/22. Patient is doing well.

## 2022-10-12 ENCOUNTER — NON-APPOINTMENT (OUTPATIENT)
Age: 37
End: 2022-10-12

## 2022-10-12 ENCOUNTER — TRANSCRIPTION ENCOUNTER (OUTPATIENT)
Age: 37
End: 2022-10-12

## 2022-10-13 ENCOUNTER — TRANSCRIPTION ENCOUNTER (OUTPATIENT)
Age: 37
End: 2022-10-13

## 2022-10-13 ENCOUNTER — APPOINTMENT (OUTPATIENT)
Dept: SPINE | Facility: HOSPITAL | Age: 37
End: 2022-10-13

## 2022-10-13 ENCOUNTER — APPOINTMENT (OUTPATIENT)
Dept: NEUROSURGERY | Facility: CLINIC | Age: 37
End: 2022-10-13

## 2022-10-13 ENCOUNTER — INPATIENT (INPATIENT)
Facility: HOSPITAL | Age: 37
LOS: 4 days | Discharge: HOME | DRG: 856 | End: 2022-10-18
Attending: NEUROLOGICAL SURGERY | Admitting: INTERNAL MEDICINE
Payer: COMMERCIAL

## 2022-10-13 VITALS
HEIGHT: 66 IN | OXYGEN SATURATION: 99 % | TEMPERATURE: 98 F | DIASTOLIC BLOOD PRESSURE: 81 MMHG | WEIGHT: 220.02 LBS | HEART RATE: 114 BPM | SYSTOLIC BLOOD PRESSURE: 124 MMHG | RESPIRATION RATE: 16 BRPM

## 2022-10-13 DIAGNOSIS — M54.50 LOW BACK PAIN, UNSPECIFIED: ICD-10-CM

## 2022-10-13 DIAGNOSIS — Z98.890 OTHER SPECIFIED POSTPROCEDURAL STATES: Chronic | ICD-10-CM

## 2022-10-13 LAB
ALBUMIN SERPL ELPH-MCNC: 3.9 G/DL — SIGNIFICANT CHANGE UP (ref 3.3–5)
ALP SERPL-CCNC: 57 U/L — SIGNIFICANT CHANGE UP (ref 40–120)
ALT FLD-CCNC: 20 U/L — SIGNIFICANT CHANGE UP (ref 10–45)
ANION GAP SERPL CALC-SCNC: 11 MMOL/L — SIGNIFICANT CHANGE UP (ref 5–17)
APTT BLD: 29.2 SEC — SIGNIFICANT CHANGE UP (ref 27.5–35.5)
AST SERPL-CCNC: 19 U/L — SIGNIFICANT CHANGE UP (ref 10–40)
BASOPHILS # BLD AUTO: 0.04 K/UL — SIGNIFICANT CHANGE UP (ref 0–0.2)
BASOPHILS NFR BLD AUTO: 0.5 % — SIGNIFICANT CHANGE UP (ref 0–2)
BILIRUB SERPL-MCNC: 0.2 MG/DL — SIGNIFICANT CHANGE UP (ref 0.2–1.2)
BLD GP AB SCN SERPL QL: NEGATIVE — SIGNIFICANT CHANGE UP
BUN SERPL-MCNC: 8 MG/DL — SIGNIFICANT CHANGE UP (ref 7–23)
CALCIUM SERPL-MCNC: 9 MG/DL — SIGNIFICANT CHANGE UP (ref 8.4–10.5)
CHLORIDE SERPL-SCNC: 104 MMOL/L — SIGNIFICANT CHANGE UP (ref 96–108)
CO2 SERPL-SCNC: 25 MMOL/L — SIGNIFICANT CHANGE UP (ref 22–31)
CREAT SERPL-MCNC: 0.75 MG/DL — SIGNIFICANT CHANGE UP (ref 0.5–1.3)
EGFR: 106 ML/MIN/1.73M2 — SIGNIFICANT CHANGE UP
EOSINOPHIL # BLD AUTO: 0.07 K/UL — SIGNIFICANT CHANGE UP (ref 0–0.5)
EOSINOPHIL NFR BLD AUTO: 0.8 % — SIGNIFICANT CHANGE UP (ref 0–6)
GAS PNL BLDV: SIGNIFICANT CHANGE UP
GLUCOSE SERPL-MCNC: 126 MG/DL — HIGH (ref 70–99)
HCT VFR BLD CALC: 34.1 % — LOW (ref 34.5–45)
HGB BLD-MCNC: 11.4 G/DL — LOW (ref 11.5–15.5)
IMM GRANULOCYTES NFR BLD AUTO: 0.9 % — SIGNIFICANT CHANGE UP (ref 0–0.9)
INR BLD: 1.26 RATIO — HIGH (ref 0.88–1.16)
LYMPHOCYTES # BLD AUTO: 1.78 K/UL — SIGNIFICANT CHANGE UP (ref 1–3.3)
LYMPHOCYTES # BLD AUTO: 20.8 % — SIGNIFICANT CHANGE UP (ref 13–44)
MCHC RBC-ENTMCNC: 28.6 PG — SIGNIFICANT CHANGE UP (ref 27–34)
MCHC RBC-ENTMCNC: 33.4 GM/DL — SIGNIFICANT CHANGE UP (ref 32–36)
MCV RBC AUTO: 85.7 FL — SIGNIFICANT CHANGE UP (ref 80–100)
MONOCYTES # BLD AUTO: 0.7 K/UL — SIGNIFICANT CHANGE UP (ref 0–0.9)
MONOCYTES NFR BLD AUTO: 8.2 % — SIGNIFICANT CHANGE UP (ref 2–14)
NEUTROPHILS # BLD AUTO: 5.9 K/UL — SIGNIFICANT CHANGE UP (ref 1.8–7.4)
NEUTROPHILS NFR BLD AUTO: 68.8 % — SIGNIFICANT CHANGE UP (ref 43–77)
NRBC # BLD: 0 /100 WBCS — SIGNIFICANT CHANGE UP (ref 0–0)
PLATELET # BLD AUTO: 291 K/UL — SIGNIFICANT CHANGE UP (ref 150–400)
POTASSIUM SERPL-MCNC: 3.5 MMOL/L — SIGNIFICANT CHANGE UP (ref 3.5–5.3)
POTASSIUM SERPL-SCNC: 3.5 MMOL/L — SIGNIFICANT CHANGE UP (ref 3.5–5.3)
PROT SERPL-MCNC: 6.8 G/DL — SIGNIFICANT CHANGE UP (ref 6–8.3)
PROTHROM AB SERPL-ACNC: 14.7 SEC — HIGH (ref 10.5–13.4)
RBC # BLD: 3.98 M/UL — SIGNIFICANT CHANGE UP (ref 3.8–5.2)
RBC # FLD: 12.4 % — SIGNIFICANT CHANGE UP (ref 10.3–14.5)
RH IG SCN BLD-IMP: POSITIVE — SIGNIFICANT CHANGE UP
SARS-COV-2 RNA SPEC QL NAA+PROBE: SIGNIFICANT CHANGE UP
SODIUM SERPL-SCNC: 140 MMOL/L — SIGNIFICANT CHANGE UP (ref 135–145)
WBC # BLD: 8.57 K/UL — SIGNIFICANT CHANGE UP (ref 3.8–10.5)
WBC # FLD AUTO: 8.57 K/UL — SIGNIFICANT CHANGE UP (ref 3.8–10.5)

## 2022-10-13 PROCEDURE — 93970 EXTREMITY STUDY: CPT

## 2022-10-13 PROCEDURE — 97530 THERAPEUTIC ACTIVITIES: CPT

## 2022-10-13 PROCEDURE — 87641 MR-STAPH DNA AMP PROBE: CPT

## 2022-10-13 PROCEDURE — 72148 MRI LUMBAR SPINE W/O DYE: CPT

## 2022-10-13 PROCEDURE — 99285 EMERGENCY DEPT VISIT HI MDM: CPT | Mod: 25

## 2022-10-13 PROCEDURE — 84443 ASSAY THYROID STIM HORMONE: CPT

## 2022-10-13 PROCEDURE — 93010 ELECTROCARDIOGRAM REPORT: CPT

## 2022-10-13 PROCEDURE — U0003: CPT

## 2022-10-13 PROCEDURE — 86900 BLOOD TYPING SEROLOGIC ABO: CPT

## 2022-10-13 PROCEDURE — 85730 THROMBOPLASTIN TIME PARTIAL: CPT

## 2022-10-13 PROCEDURE — 86901 BLOOD TYPING SEROLOGIC RH(D): CPT

## 2022-10-13 PROCEDURE — 22015 I&D ABSCESS P-SPINE L/S/LS: CPT | Mod: 78

## 2022-10-13 PROCEDURE — 84702 CHORIONIC GONADOTROPIN TEST: CPT

## 2022-10-13 PROCEDURE — 85610 PROTHROMBIN TIME: CPT

## 2022-10-13 PROCEDURE — 72158 MRI LUMBAR SPINE W/O & W/DYE: CPT | Mod: 26

## 2022-10-13 PROCEDURE — 86850 RBC ANTIBODY SCREEN: CPT

## 2022-10-13 PROCEDURE — 85027 COMPLETE CBC AUTOMATED: CPT

## 2022-10-13 PROCEDURE — 74018 RADEX ABDOMEN 1 VIEW: CPT

## 2022-10-13 PROCEDURE — U0005: CPT

## 2022-10-13 PROCEDURE — 96374 THER/PROPH/DIAG INJ IV PUSH: CPT

## 2022-10-13 PROCEDURE — 85025 COMPLETE CBC W/AUTO DIFF WBC: CPT

## 2022-10-13 PROCEDURE — 97116 GAIT TRAINING THERAPY: CPT

## 2022-10-13 PROCEDURE — 87640 STAPH A DNA AMP PROBE: CPT

## 2022-10-13 PROCEDURE — 80048 BASIC METABOLIC PNL TOTAL CA: CPT

## 2022-10-13 PROCEDURE — 99285 EMERGENCY DEPT VISIT HI MDM: CPT

## 2022-10-13 PROCEDURE — C1769: CPT

## 2022-10-13 PROCEDURE — C1889: CPT

## 2022-10-13 PROCEDURE — 36415 COLL VENOUS BLD VENIPUNCTURE: CPT

## 2022-10-13 PROCEDURE — 80053 COMPREHEN METABOLIC PANEL: CPT

## 2022-10-13 PROCEDURE — 97161 PT EVAL LOW COMPLEX 20 MIN: CPT

## 2022-10-13 DEVICE — SURGIFLO MATRIX WITH THROMBIN KIT: Type: IMPLANTABLE DEVICE | Status: FUNCTIONAL

## 2022-10-13 DEVICE — KIT A-LINE 1LUM 20G X 12CM SAFE KIT: Type: IMPLANTABLE DEVICE | Status: FUNCTIONAL

## 2022-10-13 RX ORDER — ONDANSETRON 8 MG/1
6 TABLET, FILM COATED ORAL ONCE
Refills: 0 | Status: DISCONTINUED | OUTPATIENT
Start: 2022-10-13 | End: 2022-10-13

## 2022-10-13 RX ORDER — DIAZEPAM 5 MG
5 TABLET ORAL
Refills: 0 | Status: DISCONTINUED | OUTPATIENT
Start: 2022-10-13 | End: 2022-10-13

## 2022-10-13 RX ORDER — SODIUM CHLORIDE 9 MG/ML
1000 INJECTION, SOLUTION INTRAVENOUS ONCE
Refills: 0 | Status: COMPLETED | OUTPATIENT
Start: 2022-10-13 | End: 2022-10-13

## 2022-10-13 RX ORDER — HYDROMORPHONE HYDROCHLORIDE 2 MG/ML
1 INJECTION INTRAMUSCULAR; INTRAVENOUS; SUBCUTANEOUS ONCE
Refills: 0 | Status: DISCONTINUED | OUTPATIENT
Start: 2022-10-13 | End: 2022-10-13

## 2022-10-13 RX ORDER — VANCOMYCIN HCL 1 G
2000 VIAL (EA) INTRAVENOUS EVERY 12 HOURS
Refills: 0 | Status: DISCONTINUED | OUTPATIENT
Start: 2022-10-13 | End: 2022-10-14

## 2022-10-13 RX ORDER — ONDANSETRON 8 MG/1
8 TABLET, FILM COATED ORAL ONCE
Refills: 0 | Status: DISCONTINUED | OUTPATIENT
Start: 2022-10-13 | End: 2022-10-14

## 2022-10-13 RX ORDER — HYDROMORPHONE HYDROCHLORIDE 2 MG/ML
0.5 INJECTION INTRAMUSCULAR; INTRAVENOUS; SUBCUTANEOUS
Refills: 0 | Status: DISCONTINUED | OUTPATIENT
Start: 2022-10-13 | End: 2022-10-14

## 2022-10-13 RX ORDER — ENOXAPARIN SODIUM 100 MG/ML
40 INJECTION SUBCUTANEOUS EVERY 24 HOURS
Refills: 0 | Status: DISCONTINUED | OUTPATIENT
Start: 2022-10-13 | End: 2022-10-13

## 2022-10-13 RX ORDER — PANTOPRAZOLE SODIUM 20 MG/1
40 TABLET, DELAYED RELEASE ORAL
Refills: 0 | Status: DISCONTINUED | OUTPATIENT
Start: 2022-10-13 | End: 2022-10-13

## 2022-10-13 RX ORDER — CIPROFLOXACIN LACTATE 400MG/40ML
750 VIAL (ML) INTRAVENOUS
Refills: 0 | Status: DISCONTINUED | OUTPATIENT
Start: 2022-10-13 | End: 2022-10-13

## 2022-10-13 RX ORDER — CYCLOBENZAPRINE HYDROCHLORIDE 10 MG/1
10 TABLET, FILM COATED ORAL ONCE
Refills: 0 | Status: COMPLETED | OUTPATIENT
Start: 2022-10-13 | End: 2022-10-13

## 2022-10-13 RX ORDER — CEFEPIME 1 G/1
2000 INJECTION, POWDER, FOR SOLUTION INTRAMUSCULAR; INTRAVENOUS ONCE
Refills: 0 | Status: COMPLETED | OUTPATIENT
Start: 2022-10-13 | End: 2022-10-13

## 2022-10-13 RX ORDER — ALPRAZOLAM 0.25 MG
0.5 TABLET ORAL
Refills: 0 | Status: DISCONTINUED | OUTPATIENT
Start: 2022-10-13 | End: 2022-10-14

## 2022-10-13 RX ORDER — POLYETHYLENE GLYCOL 3350 17 G/17G
17 POWDER, FOR SOLUTION ORAL
Refills: 0 | Status: DISCONTINUED | OUTPATIENT
Start: 2022-10-13 | End: 2022-10-13

## 2022-10-13 RX ORDER — HYDROMORPHONE HYDROCHLORIDE 2 MG/ML
1 INJECTION INTRAMUSCULAR; INTRAVENOUS; SUBCUTANEOUS
Refills: 0 | Status: DISCONTINUED | OUTPATIENT
Start: 2022-10-13 | End: 2022-10-14

## 2022-10-13 RX ORDER — SENNA PLUS 8.6 MG/1
2 TABLET ORAL AT BEDTIME
Refills: 0 | Status: DISCONTINUED | OUTPATIENT
Start: 2022-10-13 | End: 2022-10-13

## 2022-10-13 RX ORDER — CEFEPIME 1 G/1
2000 INJECTION, POWDER, FOR SOLUTION INTRAMUSCULAR; INTRAVENOUS EVERY 8 HOURS
Refills: 0 | Status: DISCONTINUED | OUTPATIENT
Start: 2022-10-13 | End: 2022-10-18

## 2022-10-13 RX ORDER — CYCLOBENZAPRINE HYDROCHLORIDE 10 MG/1
10 TABLET, FILM COATED ORAL THREE TIMES A DAY
Refills: 0 | Status: DISCONTINUED | OUTPATIENT
Start: 2022-10-13 | End: 2022-10-14

## 2022-10-13 RX ORDER — CYCLOBENZAPRINE HYDROCHLORIDE 10 MG/1
10 TABLET, FILM COATED ORAL THREE TIMES A DAY
Refills: 0 | Status: DISCONTINUED | OUTPATIENT
Start: 2022-10-13 | End: 2022-10-13

## 2022-10-13 RX ORDER — ALPRAZOLAM 0.25 MG
0.5 TABLET ORAL
Refills: 0 | Status: DISCONTINUED | OUTPATIENT
Start: 2022-10-13 | End: 2022-10-13

## 2022-10-13 RX ORDER — SODIUM CHLORIDE 9 MG/ML
1000 INJECTION, SOLUTION INTRAVENOUS
Refills: 0 | Status: DISCONTINUED | OUTPATIENT
Start: 2022-10-13 | End: 2022-10-14

## 2022-10-13 RX ORDER — DIAZEPAM 5 MG
5 TABLET ORAL
Refills: 0 | Status: DISCONTINUED | OUTPATIENT
Start: 2022-10-13 | End: 2022-10-14

## 2022-10-13 RX ADMIN — HYDROMORPHONE HYDROCHLORIDE 1 MILLIGRAM(S): 2 INJECTION INTRAMUSCULAR; INTRAVENOUS; SUBCUTANEOUS at 13:24

## 2022-10-13 RX ADMIN — HYDROMORPHONE HYDROCHLORIDE 1 MILLIGRAM(S): 2 INJECTION INTRAMUSCULAR; INTRAVENOUS; SUBCUTANEOUS at 21:45

## 2022-10-13 RX ADMIN — HYDROMORPHONE HYDROCHLORIDE 1 MILLIGRAM(S): 2 INJECTION INTRAMUSCULAR; INTRAVENOUS; SUBCUTANEOUS at 10:13

## 2022-10-13 RX ADMIN — Medication 0.5 MILLIGRAM(S): at 14:42

## 2022-10-13 RX ADMIN — CYCLOBENZAPRINE HYDROCHLORIDE 10 MILLIGRAM(S): 10 TABLET, FILM COATED ORAL at 11:45

## 2022-10-13 RX ADMIN — CYCLOBENZAPRINE HYDROCHLORIDE 10 MILLIGRAM(S): 10 TABLET, FILM COATED ORAL at 22:30

## 2022-10-13 RX ADMIN — HYDROMORPHONE HYDROCHLORIDE 1 MILLIGRAM(S): 2 INJECTION INTRAMUSCULAR; INTRAVENOUS; SUBCUTANEOUS at 21:30

## 2022-10-13 RX ADMIN — HYDROMORPHONE HYDROCHLORIDE 0.5 MILLIGRAM(S): 2 INJECTION INTRAMUSCULAR; INTRAVENOUS; SUBCUTANEOUS at 22:09

## 2022-10-13 RX ADMIN — HYDROMORPHONE HYDROCHLORIDE 1 MILLIGRAM(S): 2 INJECTION INTRAMUSCULAR; INTRAVENOUS; SUBCUTANEOUS at 13:23

## 2022-10-13 RX ADMIN — HYDROMORPHONE HYDROCHLORIDE 1 MILLIGRAM(S): 2 INJECTION INTRAMUSCULAR; INTRAVENOUS; SUBCUTANEOUS at 11:45

## 2022-10-13 RX ADMIN — HYDROMORPHONE HYDROCHLORIDE 0.5 MILLIGRAM(S): 2 INJECTION INTRAMUSCULAR; INTRAVENOUS; SUBCUTANEOUS at 22:24

## 2022-10-13 RX ADMIN — Medication 5 MILLIGRAM(S): at 22:30

## 2022-10-13 RX ADMIN — HYDROMORPHONE HYDROCHLORIDE 1 MILLIGRAM(S): 2 INJECTION INTRAMUSCULAR; INTRAVENOUS; SUBCUTANEOUS at 18:12

## 2022-10-13 RX ADMIN — SODIUM CHLORIDE 1000 MILLILITER(S): 9 INJECTION, SOLUTION INTRAVENOUS at 11:44

## 2022-10-13 RX ADMIN — CEFEPIME 100 MILLIGRAM(S): 1 INJECTION, POWDER, FOR SOLUTION INTRAMUSCULAR; INTRAVENOUS at 10:17

## 2022-10-13 RX ADMIN — SODIUM CHLORIDE 75 MILLILITER(S): 9 INJECTION, SOLUTION INTRAVENOUS at 21:39

## 2022-10-13 NOTE — CONSULT NOTE ADULT - SUBJECTIVE AND OBJECTIVE BOX
p (1480)     HPI: Yoo, Sathish 37F s/p L5 lami L5/S1 (9/13) with re-admission for pseudomonas bacteremia cleared on BCx prior to DC on cipro 750mg BID (ending 10/17).  Seen in office 10/10 for increased pain after BM. Put patient MR loredo Presented to ED for increased pain, midback. Normotensive, tachycardic, afebrile. Wound looks good: clean, dry, intact. Exam: AOx3, BUE 5/5 lowers pain limited, TTP, reports stable genital numbness.      --Anticoagulation: denied    =====================  PAST MEDICAL HISTORY   Lumbar herniated disc      PAST SURGICAL HISTORY   No significant past surgical history    S/P laminectomy          MEDICATIONS:  Antibiotics:    Neuro:    Other:      SOCIAL HISTORY:   Occupation:   Marital Status:     FAMILY HISTORY:      ROS: Negative except per HPI    LABS:  PT/INR - ( 13 Oct 2022 10:34 )   PT: 14.7 sec;   INR: 1.26 ratio         PTT - ( 13 Oct 2022 10:34 )  PTT:29.2 sec                        11.4   8.57  )-----------( 291      ( 13 Oct 2022 10:34 )             34.1

## 2022-10-13 NOTE — PACU DISCHARGE NOTE - MENTAL STATUS: PATIENT PARTICIPATION
Is This A New Presentation, Or A Follow-Up?: Follow Up Cyst Additional History: Treatment with Doxy 100mg [id number], per last note if still present plan excision\\nPt states feels improved. Awake

## 2022-10-13 NOTE — ED PROVIDER NOTE - NEUROLOGICAL, MLM
sensation intact to light touch b/l LE, 5/5 strength in both dorsiflexion and plantar flexion b/l feet.  Pt unable to  provide any other movements secondary to pain.

## 2022-10-13 NOTE — ED PROVIDER NOTE - OBJECTIVE STATEMENT
37F with PMH/PSH including lumbar radiculopathy s/p L5-S1 laminectomy and discectomy (9/13/22 Dr. Patricio) (ED 9/27/22, admission 9/9/22-9/15/22. re-admission 9/18/22-9/23/22) complicated by pseudomonas bacteremia (carbapenem resistant) MRI with post surgical collection presents to the ED with back pain.  Patient reports that she has been having "pins and needles" to LE since surgery, reports numbness in groin, buttocks, posterior LEs.  Reports she had previously been able to ambulate with cane but reports she had to strain with a BM a few days ago and since then has had increased pain and difficulty ambulating.  Patient reports she has been constipated, taking narcotics for pain control.  Reports was seen recently at Dr. Patricio's office but reports did not have this level of pain at the time of the last visit.  Review of EMR HIE Neurosx note from 10/10/22 reveals patient presented to office with "chief complaint of sharp pain in mid back incision which pulsates after having to strain for bowel movement two days ago."  Mother reports patient was able to ambulate earlier after receiving Fentanyl 100 from EMS.  Mother reports that she has been caring for wound, denies worsening appearance or increased drainage, reports following with plastics (seen initially inpatient 9/23/22, Dr. Carroll) about 1-2 weeks ago and was told to follow up in 4 weeks.  From EMR, MRI L spine w/wo IV contrast (9/19/22) "postsurgical changes emanating from the ventral epidural space occupies approximately 70-75% of the spinal canal diameter. The presence of superinfection (epidural phlegmon/abscess) is not entirely excluded. Enhancing soft tissue within the posterior aspect of the spinal canal at the L5-S1 level likely representing granulation/scar tissue.  Postsurgical seroma at the level of the laminectomy defect measures approximately 1.9 x 3.6 cm. Superinfection is not entirely excluded."  Denies fevers, chills.  Denies loss of urinary or bowel continence. Denies chest pain, shortness of breath, abdominal pain, nausea, vomiting, diarrhea, urinary complaints.

## 2022-10-13 NOTE — BRIEF OPERATIVE NOTE - OPERATION/FINDINGS
S/p lumbar wound revision and washout of epidural collection, exploration of previous L5-S1 discectomy, skin debridement

## 2022-10-13 NOTE — ED ADULT NURSE NOTE - OBJECTIVE STATEMENT
37[facility]. [facility] reports recent lumbar radiculopathy s/p L5-S1 laminectomy and discectomy 9/13/22. Pt admitted to ED 9/18/22 for pseudomonas bacteremia. Pt reports she has been having back pain since straining during a BM 2days ago. Pt reports the pain is similar to the pain prior to surgery. Reports pain is sharp, shooting and feels like spasms. Pt states she has been having "pins and needles" in LE since surgery. Reports numbness in groin, buttocks and posterior aspect of LE. Pt reports she has been able to ambulate with a cane at home prior to BM strain 2 days ago. Pt reports suffering from constipation and has been taking narcotics for pain control. Prior to arrival to the ED, EMS placed 20G IV RAC and gave 100mcg fentanyl. EMS reports pt was able to ambulate to stretcher. Denies cp, sob, fever, chills, n/v/d, blood in urine or stool. Mother at bedside. Comfort and safety mantained. Will continue to monitor.

## 2022-10-13 NOTE — PRE-OP CHECKLIST - SITE MARKED BY ANESTHESIOLOGIST
Patient position supine. Patient prepped and draped per unit standard.     Safety straps applied:N/A n/a

## 2022-10-13 NOTE — ED PROVIDER NOTE - ATTENDING APP SHARED VISIT CONTRIBUTION OF CARE
Attending MD Jacobsen:   I personally have seen and examined this patient.  Physician assistant note reviewed and agree on plan of care and except where noted.  See below for details.     Seen in [facility]    37F with PMH/PSH including lumbar radiculopathy s/p L5-S1 laminectomy and discectomy (9/13/22 Dr. Patricio) (ED 9/27/22, admission 9/9/22-9/15/22. re-admission 9/18/22-9/23/22) complicated by pseudomonas bacteremia (carbapenem resistant) (MRI L spine w/wo IV contrast (9/19/22) postsurgical changes emanating from the ventral epidural space occupies approximately 70-75% of the spinal canal diameter. The presence of superinfection (epidural phlegmon/abscess) is not entirely excluded. Enhancing soft tissue within the posterior aspect of the spinal canal at the L5-S1 level likely representing granulation/scar tissue.  Postsurgical seroma at the level of the laminectomy defect measures   approximately 1.9 x 3.6 cm. Superinfection is not entirely excluded) presents to the ED with back pain    TO BE COMPLETED Attending MD Jacobsen:   I personally have seen and examined this patient.  Physician assistant note reviewed and agree on plan of care and except where noted.  See below for details.     Seen in [facility]    37F with PMH/PSH including lumbar radiculopathy s/p L5-S1 laminectomy and discectomy (9/13/22 Dr. Patricio) (ED 9/27/22, admission 9/9/22-9/15/22. re-admission 9/18/22-9/23/22) complicated by pseudomonas bacteremia (carbapenem resistant) MRI with post surgical collection presents to the ED with back pain.  Patient reports that she has been having "pins and needles" to LE since surgery, reports numbness in groin, buttocks, posterior LEs.  Reports she had previously been able to ambulate with cane but reports she had to strain with a BM a few days ago and since then has had increased pain and difficulty ambulating.  Patient reports she has been constipated, taking narcotics for pain control.  Reports was seen recently at Dr. Patricio's office but reports did not have this level of pain at the time of the last visit.  Review of EMR HIE Neurosx note from 10/10/22 reveals patient presented to office with "chief complaint of sharp pain in mid back incision which pulsates after having to strain for bowel movement two days ago."  Mother reports patient was able to ambulate earlier after receiving Fentanyl 100 from EMS.  Mother reports that she has been caring for wound, denies worsening appearance or increased drainage, reports following with plastics (seen initially inpatient 9/23/22, Dr. Carroll) about 1-2 weeks ago and was told to follow up in 4 weeks.  From EMR, MRI L spine w/wo IV contrast (9/19/22) "postsurgical changes emanating from the ventral epidural space occupies approximately 70-75% of the spinal canal diameter. The presence of superinfection (epidural phlegmon/abscess) is not entirely excluded. Enhancing soft tissue within the posterior aspect of the spinal canal at the L5-S1 level likely representing granulation/scar tissue.  Postsurgical seroma at the level of the laminectomy defect measures approximately 1.9 x 3.6 cm. Superinfection is not entirely excluded."  Denies fevers, chills.  Denies loss of urinary or bowel continence. Denies chest pain, shortness of breath, abdominal pain, nausea, vomiting, diarrhea, urinary complaints.     Exam:   General: NAD  HENT: head NCAT, airway patent  Eyes: no conjunctival injection   Lungs: lungs CTAB with good inspiratory effort, no wheezing, no rhonchi, no rales  Cardiac: +S1S2, no obvious m/r/g  GI: abdomen soft with +BS, NT, exam limited secondary to body habitus  MSK: ranging neck freely, lower lumbar +tenderness vasquez-incisionally, refusing ranging of LEs secondary to pain, able to dorsi and plantar flex bilaterally with good strength 5/5   Neuro: moving all extremities spontaneously, sensory grossly intact, no gross neuro deficits, no saddle anesthesia  Psych: normal mood and affect   Skin: minimal     POD #30 Attending MD Jacobsen:   I personally have seen and examined this patient.  Physician assistant note reviewed and agree on plan of care and except where noted.  See below for details.     Seen in [facility]    37F with PMH/PSH including lumbar radiculopathy s/p L5-S1 laminectomy and discectomy (9/13/22 Dr. Patricio) (ED 9/27/22, admission 9/9/22-9/15/22. re-admission 9/18/22-9/23/22) complicated by pseudomonas bacteremia (carbapenem resistant) MRI with post surgical collection presents to the ED with back pain.  Patient reports that she has been having "pins and needles" to LE since surgery, reports numbness in groin, buttocks, posterior LEs.  Reports she had previously been able to ambulate with cane but reports she had to strain with a BM a few days ago and since then has had increased pain and difficulty ambulating.  Patient reports she has been constipated, taking narcotics for pain control.  Reports was seen recently at Dr. Patricio's office but reports did not have this level of pain at the time of the last visit.  Review of EMR HIE Neurosx note from 10/10/22 reveals patient presented to office with "chief complaint of sharp pain in mid back incision which pulsates after having to strain for bowel movement two days ago."  Mother reports patient was able to ambulate earlier after receiving Fentanyl 100 from EMS.  Mother reports that she has been caring for wound, denies worsening appearance or increased drainage, reports following with plastics (seen initially inpatient 9/23/22, Dr. Carroll) about 1-2 weeks ago and was told to follow up in 4 weeks.  From EMR, MRI L spine w/wo IV contrast (9/19/22) "postsurgical changes emanating from the ventral epidural space occupies approximately 70-75% of the spinal canal diameter. The presence of superinfection (epidural phlegmon/abscess) is not entirely excluded. Enhancing soft tissue within the posterior aspect of the spinal canal at the L5-S1 level likely representing granulation/scar tissue.  Postsurgical seroma at the level of the laminectomy defect measures approximately 1.9 x 3.6 cm. Superinfection is not entirely excluded."  Denies fevers, chills.  Denies loss of urinary or bowel continence. Denies chest pain, shortness of breath, abdominal pain, nausea, vomiting, diarrhea, urinary complaints.     Exam:   General: NAD  HENT: head NCAT, airway patent  Eyes: no conjunctival injection   Lungs: lungs CTAB with good inspiratory effort, no wheezing, no rhonchi, no rales  Cardiac: +S1S2, no obvious m/r/g  GI: abdomen soft with +BS, NT, exam limited secondary to body habitus  MSK: ranging neck freely, lower lumbar +tenderness vasquez-incisionally, refusing ranging of LEs secondary to pain, able to dorsi and plantar flex bilaterally with good strength 5/5   Neuro: moving all extremities spontaneously, sensory grossly intact, no gross neuro deficits, no saddle anesthesia  Psych: normal mood and affect   Skin: incision with no surrounding erythema, dry and intact    A/P: 37F POD #30 for spinal sx, previous MRI with collection, now with increased pain, will obtain MRI with contrast, will obtain labs, given recent bacteremia, will obtain labs, Cx, will give empiric abx (patient still on po Cipro until 10/17/22) will give IV Cefepime as per previous admission, will give analgesic, will need admission

## 2022-10-13 NOTE — H&P ADULT - ASSESSMENT
37F s/p L5 lami L5/S1 (9/13) with re-admission for pseudomonas bacteremia cleared on BCx prior to DC on cipro 750mg BID (ending 10/17).  Seen in office 10/10 for increased pain after BM. Put patient  planned. Presented to ED for increased pain, midback. Normotensive, tachycardic, afebrile. Wound looks good: clean, dry, intact. Exam: AOx3, BUE 5/5 lowers pain limited, TTP, reports stable genital numbness.      back pain   - pain meds prn  - MR ordered  - flexeril    pseudomonas bacteremia  - cont cipro  - follow up cultures    dvt px  - lovenox

## 2022-10-14 ENCOUNTER — TRANSCRIPTION ENCOUNTER (OUTPATIENT)
Age: 37
End: 2022-10-14

## 2022-10-14 LAB
ANION GAP SERPL CALC-SCNC: 14 MMOL/L — SIGNIFICANT CHANGE UP (ref 5–17)
BUN SERPL-MCNC: 8 MG/DL — SIGNIFICANT CHANGE UP (ref 7–23)
CALCIUM SERPL-MCNC: 9 MG/DL — SIGNIFICANT CHANGE UP (ref 8.4–10.5)
CHLORIDE SERPL-SCNC: 102 MMOL/L — SIGNIFICANT CHANGE UP (ref 96–108)
CO2 SERPL-SCNC: 22 MMOL/L — SIGNIFICANT CHANGE UP (ref 22–31)
CREAT SERPL-MCNC: 0.55 MG/DL — SIGNIFICANT CHANGE UP (ref 0.5–1.3)
EGFR: 123 ML/MIN/1.73M2 — SIGNIFICANT CHANGE UP
GLUCOSE SERPL-MCNC: 127 MG/DL — HIGH (ref 70–99)
GRAM STN FLD: SIGNIFICANT CHANGE UP
HCT VFR BLD CALC: 32.4 % — LOW (ref 34.5–45)
HGB BLD-MCNC: 10.9 G/DL — LOW (ref 11.5–15.5)
MCHC RBC-ENTMCNC: 28.2 PG — SIGNIFICANT CHANGE UP (ref 27–34)
MCHC RBC-ENTMCNC: 33.6 GM/DL — SIGNIFICANT CHANGE UP (ref 32–36)
MCV RBC AUTO: 83.9 FL — SIGNIFICANT CHANGE UP (ref 80–100)
NRBC # BLD: 0 /100 WBCS — SIGNIFICANT CHANGE UP (ref 0–0)
PLATELET # BLD AUTO: 324 K/UL — SIGNIFICANT CHANGE UP (ref 150–400)
POTASSIUM SERPL-MCNC: 3.9 MMOL/L — SIGNIFICANT CHANGE UP (ref 3.5–5.3)
POTASSIUM SERPL-SCNC: 3.9 MMOL/L — SIGNIFICANT CHANGE UP (ref 3.5–5.3)
RBC # BLD: 3.86 M/UL — SIGNIFICANT CHANGE UP (ref 3.8–5.2)
RBC # FLD: 12 % — SIGNIFICANT CHANGE UP (ref 10.3–14.5)
SODIUM SERPL-SCNC: 138 MMOL/L — SIGNIFICANT CHANGE UP (ref 135–145)
SPECIMEN SOURCE: SIGNIFICANT CHANGE UP
WBC # BLD: 8.53 K/UL — SIGNIFICANT CHANGE UP (ref 3.8–10.5)
WBC # FLD AUTO: 8.53 K/UL — SIGNIFICANT CHANGE UP (ref 3.8–10.5)

## 2022-10-14 PROCEDURE — 72157 MRI CHEST SPINE W/O & W/DYE: CPT | Mod: 26

## 2022-10-14 PROCEDURE — 72156 MRI NECK SPINE W/O & W/DYE: CPT | Mod: 26

## 2022-10-14 PROCEDURE — 99223 1ST HOSP IP/OBS HIGH 75: CPT

## 2022-10-14 RX ORDER — CHLORHEXIDINE GLUCONATE 213 G/1000ML
1 SOLUTION TOPICAL DAILY
Refills: 0 | Status: DISCONTINUED | OUTPATIENT
Start: 2022-10-14 | End: 2022-10-17

## 2022-10-14 RX ORDER — OXYCODONE HYDROCHLORIDE 5 MG/1
5 TABLET ORAL EVERY 4 HOURS
Refills: 0 | Status: DISCONTINUED | OUTPATIENT
Start: 2022-10-14 | End: 2022-10-14

## 2022-10-14 RX ORDER — DEXAMETHASONE 0.5 MG/5ML
4 ELIXIR ORAL EVERY 6 HOURS
Refills: 0 | Status: COMPLETED | OUTPATIENT
Start: 2022-10-14 | End: 2022-10-15

## 2022-10-14 RX ORDER — SENNA PLUS 8.6 MG/1
2 TABLET ORAL AT BEDTIME
Refills: 0 | Status: DISCONTINUED | OUTPATIENT
Start: 2022-10-14 | End: 2022-10-14

## 2022-10-14 RX ORDER — LIDOCAINE 4 G/100G
1 CREAM TOPICAL EVERY 24 HOURS
Refills: 0 | Status: DISCONTINUED | OUTPATIENT
Start: 2022-10-14 | End: 2022-10-18

## 2022-10-14 RX ORDER — ACETAMINOPHEN 500 MG
1000 TABLET ORAL ONCE
Refills: 0 | Status: COMPLETED | OUTPATIENT
Start: 2022-10-14 | End: 2022-10-14

## 2022-10-14 RX ORDER — ACETAMINOPHEN 500 MG
650 TABLET ORAL EVERY 6 HOURS
Refills: 0 | Status: DISCONTINUED | OUTPATIENT
Start: 2022-10-14 | End: 2022-10-18

## 2022-10-14 RX ORDER — SENNA PLUS 8.6 MG/1
2 TABLET ORAL AT BEDTIME
Refills: 0 | Status: DISCONTINUED | OUTPATIENT
Start: 2022-10-14 | End: 2022-10-18

## 2022-10-14 RX ORDER — CYCLOBENZAPRINE HYDROCHLORIDE 10 MG/1
10 TABLET, FILM COATED ORAL THREE TIMES A DAY
Refills: 0 | Status: DISCONTINUED | OUTPATIENT
Start: 2022-10-14 | End: 2022-10-18

## 2022-10-14 RX ORDER — VANCOMYCIN HCL 1 G
1500 VIAL (EA) INTRAVENOUS EVERY 12 HOURS
Refills: 0 | Status: DISCONTINUED | OUTPATIENT
Start: 2022-10-14 | End: 2022-10-18

## 2022-10-14 RX ORDER — INFLUENZA VIRUS VACCINE 15; 15; 15; 15 UG/.5ML; UG/.5ML; UG/.5ML; UG/.5ML
0.5 SUSPENSION INTRAMUSCULAR ONCE
Refills: 0 | Status: DISCONTINUED | OUTPATIENT
Start: 2022-10-14 | End: 2022-10-18

## 2022-10-14 RX ORDER — POLYETHYLENE GLYCOL 3350 17 G/17G
17 POWDER, FOR SOLUTION ORAL
Refills: 0 | Status: DISCONTINUED | OUTPATIENT
Start: 2022-10-14 | End: 2022-10-15

## 2022-10-14 RX ORDER — OXYCODONE HYDROCHLORIDE 5 MG/1
10 TABLET ORAL EVERY 4 HOURS
Refills: 0 | Status: DISCONTINUED | OUTPATIENT
Start: 2022-10-14 | End: 2022-10-14

## 2022-10-14 RX ORDER — GABAPENTIN 400 MG/1
100 CAPSULE ORAL EVERY 8 HOURS
Refills: 0 | Status: DISCONTINUED | OUTPATIENT
Start: 2022-10-14 | End: 2022-10-14

## 2022-10-14 RX ORDER — LACTULOSE 10 G/15ML
20 SOLUTION ORAL DAILY
Refills: 0 | Status: DISCONTINUED | OUTPATIENT
Start: 2022-10-14 | End: 2022-10-18

## 2022-10-14 RX ORDER — SIMETHICONE 80 MG/1
80 TABLET, CHEWABLE ORAL EVERY 4 HOURS
Refills: 0 | Status: DISCONTINUED | OUTPATIENT
Start: 2022-10-14 | End: 2022-10-18

## 2022-10-14 RX ORDER — TRAMADOL HYDROCHLORIDE 50 MG/1
50 TABLET ORAL EVERY 4 HOURS
Refills: 0 | Status: DISCONTINUED | OUTPATIENT
Start: 2022-10-14 | End: 2022-10-18

## 2022-10-14 RX ORDER — ENOXAPARIN SODIUM 100 MG/ML
40 INJECTION SUBCUTANEOUS
Refills: 0 | Status: DISCONTINUED | OUTPATIENT
Start: 2022-10-14 | End: 2022-10-18

## 2022-10-14 RX ORDER — LINACLOTIDE 145 UG/1
290 CAPSULE, GELATIN COATED ORAL
Refills: 0 | Status: DISCONTINUED | OUTPATIENT
Start: 2022-10-14 | End: 2022-10-18

## 2022-10-14 RX ORDER — ALPRAZOLAM 0.25 MG
0.5 TABLET ORAL EVERY 8 HOURS
Refills: 0 | Status: DISCONTINUED | OUTPATIENT
Start: 2022-10-14 | End: 2022-10-17

## 2022-10-14 RX ORDER — ACETAMINOPHEN 500 MG
1000 TABLET ORAL ONCE
Refills: 0 | Status: COMPLETED | OUTPATIENT
Start: 2022-10-15 | End: 2022-10-15

## 2022-10-14 RX ORDER — DEXAMETHASONE 0.5 MG/5ML
6 ELIXIR ORAL ONCE
Refills: 0 | Status: COMPLETED | OUTPATIENT
Start: 2022-10-14 | End: 2022-10-14

## 2022-10-14 RX ORDER — GABAPENTIN 400 MG/1
200 CAPSULE ORAL EVERY 8 HOURS
Refills: 0 | Status: DISCONTINUED | OUTPATIENT
Start: 2022-10-14 | End: 2022-10-18

## 2022-10-14 RX ADMIN — Medication 5 MILLIGRAM(S): at 09:56

## 2022-10-14 RX ADMIN — Medication 6 MILLIGRAM(S): at 12:54

## 2022-10-14 RX ADMIN — CYCLOBENZAPRINE HYDROCHLORIDE 10 MILLIGRAM(S): 10 TABLET, FILM COATED ORAL at 12:15

## 2022-10-14 RX ADMIN — Medication 400 MILLIGRAM(S): at 12:55

## 2022-10-14 RX ADMIN — SIMETHICONE 80 MILLIGRAM(S): 80 TABLET, CHEWABLE ORAL at 23:45

## 2022-10-14 RX ADMIN — Medication 0.5 MILLIGRAM(S): at 20:43

## 2022-10-14 RX ADMIN — Medication 4 MILLIGRAM(S): at 23:45

## 2022-10-14 RX ADMIN — Medication 4 MILLIGRAM(S): at 17:12

## 2022-10-14 RX ADMIN — LIDOCAINE 1 PATCH: 4 CREAM TOPICAL at 18:41

## 2022-10-14 RX ADMIN — CEFEPIME 100 MILLIGRAM(S): 1 INJECTION, POWDER, FOR SOLUTION INTRAMUSCULAR; INTRAVENOUS at 01:24

## 2022-10-14 RX ADMIN — CYCLOBENZAPRINE HYDROCHLORIDE 10 MILLIGRAM(S): 10 TABLET, FILM COATED ORAL at 23:46

## 2022-10-14 RX ADMIN — GABAPENTIN 100 MILLIGRAM(S): 400 CAPSULE ORAL at 13:00

## 2022-10-14 RX ADMIN — Medication 1000 MILLIGRAM(S): at 04:01

## 2022-10-14 RX ADMIN — Medication 300 MILLIGRAM(S): at 17:13

## 2022-10-14 RX ADMIN — Medication 400 MILLIGRAM(S): at 23:44

## 2022-10-14 RX ADMIN — GABAPENTIN 200 MILLIGRAM(S): 400 CAPSULE ORAL at 23:45

## 2022-10-14 RX ADMIN — Medication 1000 MILLIGRAM(S): at 13:25

## 2022-10-14 RX ADMIN — TRAMADOL HYDROCHLORIDE 50 MILLIGRAM(S): 50 TABLET ORAL at 20:44

## 2022-10-14 RX ADMIN — ENOXAPARIN SODIUM 40 MILLIGRAM(S): 100 INJECTION SUBCUTANEOUS at 17:13

## 2022-10-14 RX ADMIN — SENNA PLUS 2 TABLET(S): 8.6 TABLET ORAL at 23:46

## 2022-10-14 RX ADMIN — CEFEPIME 100 MILLIGRAM(S): 1 INJECTION, POWDER, FOR SOLUTION INTRAMUSCULAR; INTRAVENOUS at 23:44

## 2022-10-14 RX ADMIN — Medication 400 MILLIGRAM(S): at 03:31

## 2022-10-14 RX ADMIN — LIDOCAINE 1 PATCH: 4 CREAM TOPICAL at 16:16

## 2022-10-14 RX ADMIN — CEFEPIME 100 MILLIGRAM(S): 1 INJECTION, POWDER, FOR SOLUTION INTRAMUSCULAR; INTRAVENOUS at 11:07

## 2022-10-14 RX ADMIN — LACTULOSE 20 GRAM(S): 10 SOLUTION ORAL at 09:57

## 2022-10-14 RX ADMIN — TRAMADOL HYDROCHLORIDE 50 MILLIGRAM(S): 50 TABLET ORAL at 21:14

## 2022-10-14 RX ADMIN — SIMETHICONE 80 MILLIGRAM(S): 80 TABLET, CHEWABLE ORAL at 17:12

## 2022-10-14 RX ADMIN — Medication 250 MILLIGRAM(S): at 06:29

## 2022-10-14 RX ADMIN — CHLORHEXIDINE GLUCONATE 1 APPLICATION(S): 213 SOLUTION TOPICAL at 13:18

## 2022-10-14 NOTE — PROGRESS NOTE ADULT - SUBJECTIVE AND OBJECTIVE BOX
DATE OF SERVICE: 10-14-22 @ 20:53    Patient is a 37y old  Female who presents with a chief complaint of 10/14/22 S/P lumbar wound revision and washout of epidural collection, exploration of previous L5-S1 discectomy, skin debridement (14 Oct 2022 16:25)      SUBJECTIVE / OVERNIGHT EVENTS:  No chest pain. No shortness of breath. No complaints. No events overnight.     MEDICATIONS  (STANDING):  acetaminophen   IVPB .. 1000 milliGRAM(s) IV Intermittent once  cefepime   IVPB 2000 milliGRAM(s) IV Intermittent every 8 hours  chlorhexidine 2% Cloths 1 Application(s) Topical daily  cyclobenzaprine 10 milliGRAM(s) Oral three times a day  dexAMETHasone  Injectable 4 milliGRAM(s) IV Push every 6 hours  enoxaparin Injectable 40 milliGRAM(s) SubCutaneous <User Schedule>  gabapentin 200 milliGRAM(s) Oral every 8 hours  influenza   Vaccine 0.5 milliLiter(s) IntraMuscular once  lidocaine   4% Patch 1 Patch Transdermal every 24 hours  linaclotide 290 MICROGram(s) Oral before breakfast  senna 2 Tablet(s) Oral at bedtime  vancomycin  IVPB 1500 milliGRAM(s) IV Intermittent every 12 hours    MEDICATIONS  (PRN):  acetaminophen     Tablet .. 650 milliGRAM(s) Oral every 6 hours PRN Mild Pain (1 - 3)  ALPRAZolam 0.5 milliGRAM(s) Oral every 8 hours PRN anxiety  bisacodyl 5 milliGRAM(s) Oral every 12 hours PRN Constipation  lactulose Syrup 20 Gram(s) Oral daily PRN Constiation  polyethylene glycol 3350 17 Gram(s) Oral two times a day PRN Constipation  simethicone 80 milliGRAM(s) Chew every 4 hours PRN Upset Stomach  traMADol 50 milliGRAM(s) Oral every 4 hours PRN Moderate Pain (4 - 6)      Vital Signs Last 24 Hrs  T(C): 37.2 (14 Oct 2022 20:33), Max: 37.2 (14 Oct 2022 20:33)  T(F): 98.9 (14 Oct 2022 20:33), Max: 98.9 (14 Oct 2022 20:33)  HR: 113 (14 Oct 2022 20:33) (87 - 149)  BP: 115/67 (14 Oct 2022 20:33) (111/76 - 168/86)  BP(mean): 99 (13 Oct 2022 23:30) (99 - 120)  RR: 18 (14 Oct 2022 20:33) (14 - 20)  SpO2: 93% (14 Oct 2022 20:33) (93% - 100%)    Parameters below as of 14 Oct 2022 20:33  Patient On (Oxygen Delivery Method): room air      CAPILLARY BLOOD GLUCOSE        I&O's Summary    13 Oct 2022 07:01  -  14 Oct 2022 07:00  --------------------------------------------------------  IN: 1320 mL / OUT: 2035 mL / NET: -715 mL    14 Oct 2022 07:01  -  14 Oct 2022 20:53  --------------------------------------------------------  IN: 360 mL / OUT: 4310 mL / NET: -3950 mL        PHYSICAL EXAM:  GENERAL: NAD, well-developed  HEAD:  Atraumatic, Normocephalic  EYES: EOMI, PERRLA, conjunctiva and sclera clear  NECK: Supple, No JVD  CHEST/LUNG: Clear to auscultation bilaterally; No wheeze  HEART: Regular rate and rhythm; No murmurs, rubs, or gallops  ABDOMEN: Soft, Nontender, Nondistended; Bowel sounds present  EXTREMITIES:  2+ Peripheral Pulses, No clubbing, cyanosis, or edema  PSYCH: AAOx3  NEUROLOGY: non-focal  SKIN: No rashes or lesions    LABS:                        10.9   8.53  )-----------( 324      ( 14 Oct 2022 06:52 )             32.4     10-14    138  |  102  |  8   ----------------------------<  127<H>  3.9   |  22  |  0.55    Ca    9.0      14 Oct 2022 06:52    TPro  6.8  /  Alb  3.9  /  TBili  0.2  /  DBili  x   /  AST  19  /  ALT  20  /  AlkPhos  57  10-13    PT/INR - ( 13 Oct 2022 10:34 )   PT: 14.7 sec;   INR: 1.26 ratio         PTT - ( 13 Oct 2022 10:34 )  PTT:29.2 sec    < from: MR Lumbar Spine w/wo IV Cont (10.13.22 @ 16:15) >    INTERPRETATION:  CLINICAL INDICATION: Recent lumbar laminectomy L5-S1   with back pain, prior positive blood cultures, elevated ESR and CRP    Magnetic resonance imaging of the lumbosacral spine was carried out with   sagittal surface coil imaging from T11 to S3 using T1 and fast spin echo   T2 weighted images with and without fat saturation technique with axial   T1 and fast spin echo T2 weighted imaging on a 3.0 Christina magnet. Post   contrast axial and sagittal T1 weighted images were obtained. 10 cc of   Gadavist were intravenously injected, 0 cc were discarded.    Comparison is made with the prior MRI of 9/19/2022.    The visualized thoracic and lumbosacral vertebral bodies are normal in   height and signal intensity with the exception of L5 and S1. There is new   marrow edema with enhancement involving the endplates of L5 and S1 at the   L5-S1 disc spaces which has developed. There is also mild enhancement of   the disc space itself. There is a small ventral epidural collection at   L5-S1 rim enhances suspicious for a small epidural abscess. There is mild   to moderate mass effect on the ventral thecal sac. Mild paraspinal soft   tissue enhancement is identified at the L5-S1 disc space consistent with   periosseous inflammatory change.     There has been a lumbar laminectomy at L5-S1 and there is postoperative   change with a small amount of fluid at the level of the laminectomy. This   could represent a abscess, seroma, or contained leak. Diffusion-weighted   imaging was obtained but does not appear to be contributory.    There is also nonspecific enhancement of the nerve roots in the cauda   equina extending from L1 through L5 which may represent an infectious or   inflammatory process.    The conus conus medullaris is normal in appearance and terminates at the   L1-2 level.      Impression: Findings consistent with osteomyelitis and discitis at L5-S1   with a small ventral epidural abscess. There is no abnormal signal   involving the endplates of L5 and S1 with a small amount of periosteal   and ventral epidural enhancement since 9/19/2022. Small fluid collection   at the level of the laminectomy is nonspecific but may represent and a   sterile or infected collection, seroma or a contained leak. There is also   nonspecific enhancement of the nerve in the cauda equina may represent an   infectious or inflammatory process.    < end of copied text >        RADIOLOGY & ADDITIONAL TESTS:    Imaging Personally Reviewed:    Consultant(s) Notes Reviewed:      Care Discussed with Consultants/Other Providers:

## 2022-10-14 NOTE — OCCUPATIONAL THERAPY INITIAL EVALUATION ADULT - ADL RETRAINING, OT EVAL
pt will perform LB dressing task with I in 4 weeks using AE/techniques / pt will perform toileting task with I in 4 weeks

## 2022-10-14 NOTE — PATIENT PROFILE ADULT - FALL HARM RISK - HARM RISK INTERVENTIONS
Assistance with ambulation/Assistance OOB with selected safe patient handling equipment/Communicate Risk of Fall with Harm to all staff/Discuss with provider need for PT consult/Monitor gait and stability/Provide patient with walking aids - walker, cane, crutches/Reinforce activity limits and safety measures with patient and family/Review medications for side effects contributing to fall risk/Sit up slowly, dangle for a short time, stand at bedside before walking/Tailored Fall Risk Interventions/Toileting schedule using arm’s reach rule for commode and bathroom/Use of alarms - bed, chair and/or voice tab/Visual Cue: Yellow wristband and red socks/Bed in lowest position, wheels locked, appropriate side rails in place/Call bell, personal items and telephone in reach/Instruct patient to call for assistance before getting out of bed or chair/Non-slip footwear when patient is out of bed/[place] to call system/Physically safe environment - no spills, clutter or unnecessary equipment/Purposeful Proactive Rounding/Room/bathroom lighting operational, light cord in reach

## 2022-10-14 NOTE — PROGRESS NOTE ADULT - SUBJECTIVE AND OBJECTIVE BOX
CHIEF COMPLAINT: patient c/o incisional pain and left radicular pain and weakness and "nothing much working"  lengthy conversation regarding pain meds and need for multi modal drugs; states oxycodone doesn't work and flexeril only muscle relaxer that helps, agreed to trial of tramadol, decadron x 24hrs for radicular pain, started neuronitn [id number] for nerve pain  concerned about constipation as she has chronic issues- home linzess started  +walker +drain     OVERNIGHT EVENTS:     Vital Signs Last 24 Hrs  T(C): 36.8 (14 Oct 2022 13:04), Max: 37.9 (13 Oct 2022 18:22)  T(F): 98.3 (14 Oct 2022 13:04), Max: 100.2 (13 Oct 2022 18:22)  HR: 111 (14 Oct 2022 13:04) (87 - 149)  BP: 111/76 (14 Oct 2022 13:04) (111/76 - 168/86)  BP(mean): 99 (13 Oct 2022 23:30) (99 - 120)  RR: 18 (14 Oct 2022 13:04) (14 - 20)  SpO2: 97% (14 Oct 2022 13:04) (94% - 100%)    Parameters below as of 14 Oct 2022 13:04  Patient On (Oxygen Delivery Method): room air    DRAINS: [] JYOTI (cc/24h) [x] HMV (10cc/24h)    PHYSICAL EXAM:    General: No Acute Distress     Neurological: Awake, alert oriented to person, place and time, Following Commands, PERRL, EOMI, Face Symmetrical, Speech Fluent, Moving all extremities, full strenght except Left DF/PF 4+/5 and decreased sensation left LE throughout but decreased more distally otherwise R LE intact sensation   Pulmonary: Clear to Auscultation, No Rales, No Rhonchi, No Wheezes     Cardiovascular: S1, S2, Regular Rate and Rhythm     Gastrointestinal: Soft, Nontender, Nondistended     Incision: +dressing c/d/i, HMVC x 1    LABS:                        10.9   8.53  )-----------( 324      ( 14 Oct 2022 06:52 )             32.4    10-14    138  |  102  |  8   ----------------------------<  127<H>  3.9   |  22  |  0.55    Ca    9.0      14 Oct 2022 06:52    TPro  6.8  /  Alb  3.9  /  TBili  0.2  /  DBili  x   /  AST  19  /  ALT  20  /  AlkPhos  57  10-13  PT/INR - ( 13 Oct 2022 10:34 )   PT: 14.7 sec;   INR: 1.26 ratio         PTT - ( 13 Oct 2022 10:34 )  PTT:29.2 sec      10-13 @ 07:01  -  10-14 @ 07:00  --------------------------------------------------------  IN: 1320 mL / OUT: 2035 mL / NET: -715 mL    10-14 @ 07:01  -  10-14 @ 16:26  --------------------------------------------------------  IN: 360 mL / OUT: 2310 mL / NET: -1950 mL        MEDICATIONS:  Anticoagulation:  enoxaparin Injectable 40 milliGRAM(s) SubCutaneous <User Schedule>    Antibiotics:  cefepime   IVPB 2000 milliGRAM(s) IV Intermittent every 8 hours  vancomycin  IVPB 1500 milliGRAM(s) IV Intermittent every 12 hours    Endo:  dexAMETHasone  Injectable 4 milliGRAM(s) IV Push every 6 hours    Neuro:  acetaminophen     Tablet .. 650 milliGRAM(s) Oral every 6 hours PRN Mild Pain (1 - 3)  acetaminophen   IVPB .. 1000 milliGRAM(s) IV Intermittent once  ALPRAZolam 0.5 milliGRAM(s) Oral every 8 hours PRN anxiety  cyclobenzaprine 10 milliGRAM(s) Oral three times a day  gabapentin 200 milliGRAM(s) Oral every 8 hours  LORazepam     Tablet 0.5 milliGRAM(s) Oral once PRN on call MRIs  traMADol 50 milliGRAM(s) Oral every 4 hours PRN Moderate Pain (4 - 6)    GI/:  bisacodyl 5 milliGRAM(s) Oral every 12 hours PRN Constipation  lactulose Syrup 20 Gram(s) Oral daily PRN Constiation  linaclotide 290 MICROGram(s) Oral before breakfast  polyethylene glycol 3350 17 Gram(s) Oral two times a day PRN Constipation  senna 2 Tablet(s) Oral at bedtime    Other:   chlorhexidine 2% Cloths 1 Application(s) Topical daily  influenza   Vaccine 0.5 milliLiter(s) IntraMuscular once  lidocaine   4% Patch 1 Patch Transdermal every 24 hours    DIET: [x] Regular [] CCD [] Renal [] Puree [] Dysphagia [] Tube Feeds:     IMAGING:   no new imaging  awaiting MRIs- premedicate ativan PRN on call

## 2022-10-14 NOTE — CHART NOTE - NSCHARTNOTEFT_GEN_A_CORE
CAPRINI SCORE [CLOT] Score on Admission for     AGE RELATED RISK FACTORS                                                       MOBILITY RELATED FACTORS  [ ] Age 41-60 years                                            (1 Point)                  [ ] Bed rest                                                        (1 Point)  [ ] Age: 61-74 years                                           (2 Points)                 [ ] Plaster cast                                                   (2 Points)  [ ] Age= 75 years                                              (3 Points)                 [ ] Bed bound for more than 72 hours                 (2 Points)    DISEASE RELATED RISK FACTORS                                               GENDER SPECIFIC FACTORS  [ ] Edema in the lower extremities                       (1 Point)                  [ ] Pregnancy                                                     (1 Point)  [ ] Varicose veins                                               (1 Point)                  [ ] Post-partum < 6 weeks                                   (1 Point)             [x ] BMI > 25 Kg/m2                                            (1 Point)                  [ ] Hormonal therapy  or oral contraception          (1 Point)                 [ ] Sepsis (in the previous month)                        (1 Point)            [ ] History of pregnancy complications                 (1 point)  [ ] Pneumonia or serious lung disease                                            [ ] Unexplained or recurrent                     (1 Point)           (in the previous month)                               (1 Point)  [ ] Abnormal pulmonary function test                     (1 Point)                 SURGERY RELATED RISK FACTORS (include planned surgeries)  [ ] Acute myocardial infarction                              (1 Point)                 [ ]  Section                                             (1 Point)  [ ] Congestive heart failure (in the previous month)  (1 Point)         [ ] Minor surgery                                                  (1 Point)   [ ] Inflammatory bowel disease                             (1 Point)                 [ ] Arthroscopic surgery                                        (2 Points)  [ ] Central venous access                                      (2 Points)                 [ x] General surgery lasting more than 45 minutes   (2 Points)       [ ] Stroke (in the previous month)                          (5 Points)               [ ] Elective arthroplasty                                         (5 Points)            [ ] current or past malignancy                              (2 Points)                                                                                                       HEMATOLOGY RELATED FACTORS                                                 TRAUMA RELATED RISK FACTORS  [ ] Prior episodes of VTE                                     (3 Points)                [ ] Fracture of the hip, pelvis, or leg                       (5 Points)  [ ] Positive family history for VTE                         (3 Points)             [ ] Acute spinal cord injury (in the previous month)  (5 Points)  [ ] Prothrombin 49608 A                                     (3 Points)                [ ] Paralysis  (less than 1 month)                             (5 Points)  [ ] Factor V Leiden                                             (3 Points)                  [ ] Multiple Trauma within 1 month                        (5 Points)  [ ] Lupus anticoagulants                                     (3 Points)                                                           [ ] Anticardiolipin antibodies                               (3 Points)                                                       [ ] High homocysteine in the blood                      (3 Points)                                             [ ] Other congenital or acquired thrombophilia      (3 Points)                                                [ ] Heparin induced thrombocytopenia                  (3 Points)                                          Total Score [    3      ]    Risk:  Very low 0   Low 1 to 2   Moderate 3 to 4   High =5       VTE Prophylaxis Recommendations:  [x ] mechanical pneumatic compression devices                                      [ ] contraindicated: _____________________  [ x] chemo prophylaxis                                                                                  [ ] contraindicated _____________________    **** HIGH LIKELIHOOD DVT PRESENT ON ADMISSION  [ ] (please order LE dopplers within 24 hours of admission)

## 2022-10-14 NOTE — OCCUPATIONAL THERAPY INITIAL EVALUATION ADULT - ADDITIONAL COMMENTS
pt reports lives with parents in private home. 5 steps to enter, 5 stairs inside, walk-in shower with shower chair. prior to admission ambulating with cane, receiving home PT and assist as needed for ADLs - bathing, LB dress from mother.

## 2022-10-14 NOTE — CONSULT NOTE ADULT - ATTENDING COMMENTS
Pt seen and examined.  Sig back pain.    GARNER well.  no change in sensory loss pre and post op  wound is C & D  MRI reviewed with Dr. Delarosa. Evid of L5/S1 discitis with epidural collection and posterior soft tissue collection.  enhancement of nerve roots.  all is c/w post-opertive discitis/osteo.    Pt will benefit from exploration of wound with debridment and wash out.  Will also send intra-op Cx.    Plan: Surgery tonight  ID re-consult  MRI Cerv and Thoracic spine post-op  C-spine x-rays now  Bld Cx.
37F with hx lumbar radiculopathy s/p L5-S1 laminectomy and discectomy on 9/13/22.  Readmitted 9/18/22-9/23/22 for increasing back pain. Found to have pseudomonas bacteremia (carbapenem resistant) MRI with post surgical seroma.  She was treated with IV cefepime and discharged on oral ciprofloxacin which she was to complete 10/17.  Returned again 10/13 as she lost strength to her legs.  Here, MRI noted epidural collection.  She was taken to the OR and underwent washout of epidural collection and lumbar wound revision. Drained cloudy fluid, not cornelio pus. Tissue cultures collected.    Epidural abscess  - suspect pseudomonas aeruginosa given recent bacteremia with PA  - will cover for possible staph/MRSA with vancomycin   - f/u cultures  - can lower vancomycin 1500mg IV q12 for now  - monitor vancomycin trough  - will likely need 6 weeks IV antibiotics    Please call the ID service [phone] with questions or concerns over the weekend

## 2022-10-14 NOTE — PHYSICAL THERAPY INITIAL EVALUATION ADULT - ADDITIONAL COMMENTS
Pt lives in a house with 5 steps +5 steps to enter. Patient was independent with all ADLs and IADLs prior to admission. Ambulating prior to admission with straight cane. Owns RW and cane.

## 2022-10-14 NOTE — CONSULT NOTE ADULT - SUBJECTIVE AND OBJECTIVE BOX
Patient is a 37y old  Female who presents with a chief complaint of lumbar discitis/osteo (14 Oct 2022 08:55)    HPI:   37F with PMH/PSH including lumbar radiculopathy s/p L5-S1 laminectomy and discectomy (9/13/22 Dr. Patricio) (ED 9/27/22, admission 9/9/22-9/15/22. re-admission 9/18/22-9/23/22) complicated by pseudomonas bacteremia (carbapenem resistant) MRI with post surgical collection presents to the ED with back pain.  Patient reports that she has been having "pins and needles" to LE since surgery, reports numbness in groin, buttocks, posterior LEs.  Reports she had previously been able to ambulate with cane but reports she had to strain with a BM a few days ago and since then has had increased pain and difficulty ambulating.  Patient reports she has been constipated, taking narcotics for pain control.  Reports was seen recently at Dr. Patricio's office but reports did not have this level of pain at the time of the last visit.  Review of EMR HIE Neurosx note from 10/10/22 reveals patient presented to office with "chief complaint of sharp pain in mid back incision which pulsates after having to strain for bowel movement two days ago."  Mother reports patient was able to ambulate earlier after receiving Fentanyl 100 from EMS.  Mother reports that she has been caring for wound, denies worsening appearance or increased drainage, reports following with plastics (seen initially inpatient 9/23/22, Dr. Carroll) about 1-2 weeks ago and was told to follow up in 4 weeks.  From EMR, MRI L spine w/wo IV contrast (9/19/22) "postsurgical changes emanating from the ventral epidural space occupies approximately 70-75% of the spinal canal diameter. The presence of superinfection (epidural phlegmon/abscess) is not entirely excluded. Enhancing soft tissue within the posterior aspect of the spinal canal at the L5-S1 level likely representing granulation/scar tissue.  Postsurgical seroma at the level of the laminectomy defect measures approximately 1.9 x 3.6 cm. Superinfection is not entirely excluded."  Denies fevers, chills.  Denies loss of urinary or bowel continence. Denies chest pain, shortness of breath, abdominal pain, nausea, vomiting, diarrhea, urinary complaints. (13 Oct 2022 13:07)           REVIEW OF SYSTEMS  [  ] ROS unobtainable because:    [ x ] All other systems negative except as noted below    Constitutional:  [ ] fever [ ] chills  [ ] weight loss  [ ]night sweat  [ ]poor appetite/PO intake [ ]fatigue   Skin:  [ ] rash [ ] phlebitis	  Eyes: [ ] icterus [ ] pain  [ ] discharge	  ENMT: [ ] sore throat  [ ] thrush [ ] ulcers [ ] exudates [ ]anosmia  Respiratory: [ ] dyspnea [ ] hemoptysis [ ] cough [ ] sputum	  Cardiovascular:  [ ] chest pain [ ] palpitations [ ] edema	  Gastrointestinal:  [ ] nausea [ ] vomiting [ ] diarrhea [ ] constipation [ ] pain	  Genitourinary:  [ ] dysuria [ ] frequency [ ] hematuria [ ] discharge [ ] flank pain  [ ] incontinence  Musculoskeletal:  [ ] myalgias [ ] arthralgias [ ] arthritis  [ ] back pain  Neurological:  [ ] headache [ ] weakness [ ] seizures  [ ] confusion/altered mental status    prior hospital charts reviewed [V]  primary team notes reviewed [V]  other consultant notes reviewed [V]    PAST MEDICAL & SURGICAL HISTORY:  Lumbar herniated disc      S/P laminectomy  L5-S1 on 9/13/2022          SOCIAL HISTORY:  - Denied smoking/vaping/alcohol/recreational drug use    FAMILY HISTORY:      Allergies  No Known Allergies        ANTIMICROBIALS:  cefepime   IVPB 2000 every 8 hours  vancomycin  IVPB 2000 every 12 hours      ANTIMICROBIALS (past 90 days):  MEDICATIONS  (STANDING):  cefepime   IVPB   100 mL/Hr IV Intermittent (10-14-22 @ 01:24)    cefepime   IVPB   100 mL/Hr IV Intermittent (10-13-22 @ 10:17)    vancomycin  IVPB   250 mL/Hr IV Intermittent (10-14-22 @ 06:29)        OTHER MEDS:   MEDICATIONS  (STANDING):  acetaminophen     Tablet .. 650 every 6 hours PRN  ALPRAZolam 0.5 two times a day PRN  bisacodyl 5 every 12 hours PRN  cyclobenzaprine 10 three times a day PRN  diazepam    Tablet 5 two times a day  influenza   Vaccine 0.5 once  lactulose Syrup 20 daily PRN  oxyCODONE    IR 5 every 4 hours PRN  oxyCODONE    IR 10 every 4 hours PRN  polyethylene glycol 3350 17 two times a day PRN  senna 2 at bedtime PRN      VITALS:  Vital Signs Last 24 Hrs  T(F): 98.1 (10-14-22 @ 08:06), Max: 100.2 (10-13-22 @ 18:22)    Vital Signs Last 24 Hrs  HR: 101 (10-14-22 @ 08:06) (87 - 149)  BP: 126/84 (10-14-22 @ 08:06) (114/79 - 168/86)  RR: 18 (10-14-22 @ 08:06)  SpO2: 97% (10-14-22 @ 08:06) (94% - 100%)  Wt(kg): --    EXAM:    GA: NAD, AOx3  HEENT: oral cavity no lesion  CV: nl S1/S2, no RMG  Lungs: CTAB, No distress  Abd: BS+, soft, nontender, no rebounding pain  Ext: no edema  Neuro: No focal deficits  Skin: Intact  IV: no phlebitis    Labs:                        10.9   8.53  )-----------( 324      ( 14 Oct 2022 06:52 )             32.4     10-14    138  |  102  |  8   ----------------------------<  127<H>  3.9   |  22  |  0.55    Ca    9.0      14 Oct 2022 06:52    TPro  6.8  /  Alb  3.9  /  TBili  0.2  /  DBili  x   /  AST  19  /  ALT  20  /  AlkPhos  57  10-13      WBC Trend:  WBC Count: 8.53 (10-14-22 @ 06:52)  WBC Count: 8.57 (10-13-22 @ 10:34)      Auto Neutrophil #: 5.90 K/uL (10-13-22 @ 10:34)  Auto Neutrophil #: 7.80 K/uL (09-09-22 @ 12:54)  Auto Neutrophil #: 8.80 K/uL (09-07-22 @ 12:22)      Creatine Trend:  Creatinine, Serum: 0.55 (10-14)  Creatinine, Serum: 0.75 (10-13)      Liver Biochemical Testing Trend:  Alanine Aminotransferase (ALT/SGPT): 20 (10-13)  Alanine Aminotransferase (ALT/SGPT): 22 (09-18)  Alanine Aminotransferase (ALT/SGPT): 15 (09-09)  Alanine Aminotransferase (ALT/SGPT): 16 (09-07)  Aspartate Aminotransferase (AST/SGOT): 19 (10-13-22 @ 10:34)  Aspartate Aminotransferase (AST/SGOT): 24 (09-18-22 @ 05:24)  Aspartate Aminotransferase (AST/SGOT): 16 (09-09-22 @ 12:54)  Aspartate Aminotransferase (AST/SGOT): 15 (09-07-22 @ 12:22)  Bilirubin Total, Serum: 0.2 (10-13)  Bilirubin Total, Serum: 0.2 (09-18)  Bilirubin Total, Serum: 0.3 (09-09)  Bilirubin Total, Serum: 0.4 (09-07)      Trend LDH      Auto Eosinophil %: 0.8 % (10-13-22 @ 10:34)          MICROBIOLOGY:    MRSA PCR Result.: NotDetec (09-19-22 @ 20:28)  MRSA PCR Result.: NotDetec (09-11-22 @ 11:49)      Culture - Blood (collected 21 Sep 2022 07:59)  Source: .Blood Blood-Venous  Final Report:    No Growth Final    Culture - Blood (collected 21 Sep 2022 07:59)  Source: .Blood Blood  Final Report:    No Growth Final    Culture - Blood (collected 18 Sep 2022 22:28)  Source: .Blood Blood-Peripheral  Final Report:    Growth in aerobic bottle: Pseudomonas aeruginosa (Carbapenem Resistant)    ***Blood Panel PCR results on this specimen are available    approximately 3 hours after the Gram stain result.***    Gram stain, PCR, and/or culture results may not always    correspond due to difference in methodologies.    ************************************************************    This PCR assay was performed by multiplex PCR. This    Assay tests for 66 bacterial and resistance gene targets.    Please refer to the [facility] test directory    at [email or web address] for details.  Organism: Blood Culture PCR  Pseudomonas aeruginosa (Carbapenem Resistant)  Organism: Pseudomonas aeruginosa (Carbapenem Resistant)    Sensitivities:      -  Amikacin: S <=16      -  Aztreonam: S <=4      -  Cefepime: S <=2      -  Ceftazidime: S <=1      -  Ceftazidime/Avibactam: S <=4      -  Ceftolozane/tazobactam: S <=2      -  Ciprofloxacin: S <=0.25      -  Gentamicin: S <=2      -  Imipenem: R >8      -  Levofloxacin: S <=0.5      -  Meropenem: I 4      -  Piperacillin/Tazobactam: S <=8      -  Tobramycin: S <=2      Method Type: NADEEM  Organism: Blood Culture PCR    Sensitivities:      -  Pseudomonas aeruginosa: Detec      Method Type: PCR    Culture - Blood (collected 18 Sep 2022 22:17)  Source: .Blood Blood-Peripheral  Final Report:    No Growth Final      HIV-1/2 Combo Result: Nonreact (09-07-22 @ 12:22)                      COVID-19 PCR: NotDetec (10-13-22 @ 10:35)  COVID-19 PCR: NotDetec (09-18-22 @ 05:24)  COVID-19 PCR: NotDetec (09-09-22 @ 12:54)        Procalcitonin, Serum: 0.04 (10-13)    C-Reactive Protein, Serum: 33 (10-13)                  Sedimentation Rate, Erythrocyte: 59 mm/hr (10-13-22 @ 10:35)  Sedimentation Rate, Erythrocyte: 72 mm/hr (09-19-22 @ 15:39)    CSF:                  RADIOLOGY:  imaging below personally reviewed     Patient is a 37y old  Female who presents with a chief complaint of lumbar discitis/osteo (14 Oct 2022 08:55)    HPI:   37F with PMH/PSH including lumbar radiculopathy s/p L5-S1 laminectomy and discectomy (9/13/22 Dr. Patricio) (ED 9/27/22, admission 9/9/22-9/15/22. re-admission 9/18/22-9/23/22) complicated by pseudomonas bacteremia (carbapenem resistant) MRI with post surgical collection presents to the ED with back pain.  Patient reports that she has been having "pins and needles" to LE since surgery, reports numbness in groin, buttocks, posterior LEs.  Reports she had previously been able to ambulate with cane but reports she had to strain with a BM a few days ago and since then has had increased pain and difficulty ambulating.  Patient reports she has been constipated, taking narcotics for pain control.  Reports was seen recently at Dr. Patricio's office but reports did not have this level of pain at the time of the last visit.  Review of EMR HIE Neurosx note from 10/10/22 reveals patient presented to office with "chief complaint of sharp pain in mid back incision which pulsates after having to strain for bowel movement two days ago."  Mother reports patient was able to ambulate earlier after receiving Fentanyl 100 from EMS.  Mother reports that she has been caring for wound, denies worsening appearance or increased drainage, reports following with plastics (seen initially inpatient 9/23/22, Dr. Carroll) about 1-2 weeks ago and was told to follow up in 4 weeks.  From EMR, MRI L spine w/wo IV contrast (9/19/22) "postsurgical changes emanating from the ventral epidural space occupies approximately 70-75% of the spinal canal diameter. The presence of superinfection (epidural phlegmon/abscess) is not entirely excluded. Enhancing soft tissue within the posterior aspect of the spinal canal at the L5-S1 level likely representing granulation/scar tissue.  Postsurgical seroma at the level of the laminectomy defect measures approximately 1.9 x 3.6 cm. Superinfection is not entirely excluded."  Denies fevers, chills.  Denies loss of urinary or bowel continence. Denies chest pain, shortness of breath, abdominal pain, nausea, vomiting, diarrhea, urinary complaints. (13 Oct 2022 13:07)     During 9/18 admission, patient treated with cefepime and discharged with ciprofloxacin on 9/23, course was supposed to be until 10/17. Patient had appt with Dr. Patricio's office 10/10 and reports have sharp, stabbing back pain. Reports that since prior surgery, she has had numbness in her groin and buttocks. No issues with urinary and bowel incontinence. Presented to the ED because of loss of strength of her legs bilaterally. Denies fevers or chills at home.     Pt seen, laying in bed and reports continued back pain after procedure. Has walker which is draining and has yet to have a BM.       REVIEW OF SYSTEMS  [  ] ROS unobtainable because:    [ x ] All other systems negative except as noted below    Constitutional:  [ ] fever [ ] chills  [ ] weight loss  [ ]night sweat  [ ]poor appetite/PO intake [ ]fatigue   Skin:  [ ] rash [ ] phlebitis	  Eyes: [ ] icterus [ ] pain  [ ] discharge	  ENMT: [ ] sore throat  [ ] thrush [ ] ulcers [ ] exudates [ ]anosmia  Respiratory: [ ] dyspnea [ ] hemoptysis [ ] cough [ ] sputum	  Cardiovascular:  [ ] chest pain [ ] palpitations [ ] edema	  Gastrointestinal:  [ ] nausea [ ] vomiting [ ] diarrhea [ ] constipation [ ] pain	  Genitourinary:  [ ] dysuria [ ] frequency [ ] hematuria [ ] discharge [ ] flank pain  [ ] incontinence  Musculoskeletal:  [ ] myalgias [ ] arthralgias [ ] arthritis  [ ] back pain  Neurological:  [ ] headache [ ] weakness [ ] seizures  [ ] confusion/altered mental status    prior hospital charts reviewed [V]  primary team notes reviewed [V]  other consultant notes reviewed [V]    PAST MEDICAL & SURGICAL HISTORY:  Lumbar herniated disc      S/P laminectomy  L5-S1 on 9/13/2022          SOCIAL HISTORY:  - Denied smoking/vaping/alcohol/recreational drug use    FAMILY HISTORY:      Allergies  No Known Allergies        ANTIMICROBIALS:  cefepime   IVPB 2000 every 8 hours  vancomycin  IVPB 2000 every 12 hours      ANTIMICROBIALS (past 90 days):  MEDICATIONS  (STANDING):  cefepime   IVPB   100 mL/Hr IV Intermittent (10-14-22 @ 01:24)    cefepime   IVPB   100 mL/Hr IV Intermittent (10-13-22 @ 10:17)    vancomycin  IVPB   250 mL/Hr IV Intermittent (10-14-22 @ 06:29)        OTHER MEDS:   MEDICATIONS  (STANDING):  acetaminophen     Tablet .. 650 every 6 hours PRN  ALPRAZolam 0.5 two times a day PRN  bisacodyl 5 every 12 hours PRN  cyclobenzaprine 10 three times a day PRN  diazepam    Tablet 5 two times a day  influenza   Vaccine 0.5 once  lactulose Syrup 20 daily PRN  oxyCODONE    IR 5 every 4 hours PRN  oxyCODONE    IR 10 every 4 hours PRN  polyethylene glycol 3350 17 two times a day PRN  senna 2 at bedtime PRN      VITALS:  Vital Signs Last 24 Hrs  T(F): 98.1 (10-14-22 @ 08:06), Max: 100.2 (10-13-22 @ 18:22)    Vital Signs Last 24 Hrs  HR: 101 (10-14-22 @ 08:06) (87 - 149)  BP: 126/84 (10-14-22 @ 08:06) (114/79 - 168/86)  RR: 18 (10-14-22 @ 08:06)  SpO2: 97% (10-14-22 @ 08:06) (94% - 100%)  Wt(kg): --    EXAM:    GA: NAD, AOx3  HEENT: oral cavity no lesion  CV: nl S1/S2, no RMG  Lungs: CTAB, No distress  Abd: BS+, soft, nontender, no rebounding pain  Ext: no edema  Neuro: No focal deficits  Skin: Intact  IV: no phlebitis    Labs:                        10.9   8.53  )-----------( 324      ( 14 Oct 2022 06:52 )             32.4     10-14    138  |  102  |  8   ----------------------------<  127<H>  3.9   |  22  |  0.55    Ca    9.0      14 Oct 2022 06:52    TPro  6.8  /  Alb  3.9  /  TBili  0.2  /  DBili  x   /  AST  19  /  ALT  20  /  AlkPhos  57  10-13      WBC Trend:  WBC Count: 8.53 (10-14-22 @ 06:52)  WBC Count: 8.57 (10-13-22 @ 10:34)      Auto Neutrophil #: 5.90 K/uL (10-13-22 @ 10:34)  Auto Neutrophil #: 7.80 K/uL (09-09-22 @ 12:54)  Auto Neutrophil #: 8.80 K/uL (09-07-22 @ 12:22)      Creatine Trend:  Creatinine, Serum: 0.55 (10-14)  Creatinine, Serum: 0.75 (10-13)      Liver Biochemical Testing Trend:  Alanine Aminotransferase (ALT/SGPT): 20 (10-13)  Alanine Aminotransferase (ALT/SGPT): 22 (09-18)  Alanine Aminotransferase (ALT/SGPT): 15 (09-09)  Alanine Aminotransferase (ALT/SGPT): 16 (09-07)  Aspartate Aminotransferase (AST/SGOT): 19 (10-13-22 @ 10:34)  Aspartate Aminotransferase (AST/SGOT): 24 (09-18-22 @ 05:24)  Aspartate Aminotransferase (AST/SGOT): 16 (09-09-22 @ 12:54)  Aspartate Aminotransferase (AST/SGOT): 15 (09-07-22 @ 12:22)  Bilirubin Total, Serum: 0.2 (10-13)  Bilirubin Total, Serum: 0.2 (09-18)  Bilirubin Total, Serum: 0.3 (09-09)  Bilirubin Total, Serum: 0.4 (09-07)      Trend LDH      Auto Eosinophil %: 0.8 % (10-13-22 @ 10:34)          MICROBIOLOGY:    MRSA PCR Result.: NotDetec (09-19-22 @ 20:28)  MRSA PCR Result.: NotDetec (09-11-22 @ 11:49)      Culture - Blood (collected 21 Sep 2022 07:59)  Source: .Blood Blood-Venous  Final Report:    No Growth Final    Culture - Blood (collected 21 Sep 2022 07:59)  Source: .Blood Blood  Final Report:    No Growth Final    Culture - Blood (collected 18 Sep 2022 22:28)  Source: .Blood Blood-Peripheral  Final Report:    Growth in aerobic bottle: Pseudomonas aeruginosa (Carbapenem Resistant)    ***Blood Panel PCR results on this specimen are available    approximately 3 hours after the Gram stain result.***    Gram stain, PCR, and/or culture results may not always    correspond due to difference in methodologies.    ************************************************************    This PCR assay was performed by multiplex PCR. This    Assay tests for 66 bacterial and resistance gene targets.    Please refer to the [facility] test directory    at [email or web address] for details.  Organism: Blood Culture PCR  Pseudomonas aeruginosa (Carbapenem Resistant)  Organism: Pseudomonas aeruginosa (Carbapenem Resistant)    Sensitivities:      -  Amikacin: S <=16      -  Aztreonam: S <=4      -  Cefepime: S <=2      -  Ceftazidime: S <=1      -  Ceftazidime/Avibactam: S <=4      -  Ceftolozane/tazobactam: S <=2      -  Ciprofloxacin: S <=0.25      -  Gentamicin: S <=2      -  Imipenem: R >8      -  Levofloxacin: S <=0.5      -  Meropenem: I 4      -  Piperacillin/Tazobactam: S <=8      -  Tobramycin: S <=2      Method Type: NADEEM  Organism: Blood Culture PCR    Sensitivities:      -  Pseudomonas aeruginosa: Detec      Method Type: PCR    Culture - Blood (collected 18 Sep 2022 22:17)  Source: .Blood Blood-Peripheral  Final Report:    No Growth Final      HIV-1/2 Combo Result: Nonreact (09-07-22 @ 12:22)                      COVID-19 PCR: NotDetec (10-13-22 @ 10:35)  COVID-19 PCR: NotDetec (09-18-22 @ 05:24)  COVID-19 PCR: NotDetec (09-09-22 @ 12:54)        Procalcitonin, Serum: 0.04 (10-13)    C-Reactive Protein, Serum: 33 (10-13)                  Sedimentation Rate, Erythrocyte: 59 mm/hr (10-13-22 @ 10:35)  Sedimentation Rate, Erythrocyte: 72 mm/hr (09-19-22 @ 15:39)    CSF:                  RADIOLOGY:  imaging below personally reviewed     Patient is a 37y old  Female who presents with a chief complaint of lumbar discitis/osteo (14 Oct 2022 08:55)    HPI:  37F with hx lumbar radiculopathy s/p L5-S1 laminectomy and discectomy (9/13/22 Dr. Patricio) followed by re-admission 9/18/22-9/23/22 complicated by pseudomonas bacteremia (carbapenem resistant) MRI with post surgical collection presents to the ED with back pain.  Received cefepime and was discharged on oral ciprofloxacin to be completed 10/17.   Follow up with Dr. Patricio's office on 10/10 and reported sharp, stabbing back pain. Reports that since prior surgery, she has had numbness in her groin and buttocks. No issues with urinary and bowel incontinence. Presented to the ED because of loss of strength of her legs bilaterally. Denies fevers or chills at home.     Pt seen, laying in bed and reports continued back pain after procedure. Has walker which is draining and has yet to have a BM.     REVIEW OF SYSTEMS  [  ] ROS unobtainable because:    [ x ] All other systems negative except as noted below    Constitutional:  [ ] fever [ ] chills  [ ] weight loss  [ ]night sweat  [ ]poor appetite/PO intake [ ]fatigue   Skin:  [ ] rash [ ] phlebitis	  Eyes: [ ] icterus [ ] pain  [ ] discharge	  ENMT: [ ] sore throat  [ ] thrush [ ] ulcers [ ] exudates [ ]anosmia  Respiratory: [ ] dyspnea [ ] hemoptysis [ ] cough [ ] sputum	  Cardiovascular:  [ ] chest pain [ ] palpitations [ ] edema	  Gastrointestinal:  [ ] nausea [ ] vomiting [ ] diarrhea [ ] constipation [ ] pain	  Genitourinary:  [ ] dysuria [ ] frequency [ ] hematuria [ ] discharge [ ] flank pain  [ ] incontinence  Musculoskeletal:  [ ] myalgias [ ] arthralgias [ ] arthritis  [ ] back pain  Neurological:  [ ] headache [ ] weakness [ ] seizures  [ ] confusion/altered mental status    prior hospital charts reviewed [V]  primary team notes reviewed [V]  other consultant notes reviewed [V]    PAST MEDICAL & SURGICAL HISTORY:  Lumbar herniated disc  S/P laminectomy L5-S1 on 9/13/2022    SOCIAL HISTORY:  - Denied smoking/vaping/alcohol/recreational drug use    FAMILY HISTORY:    Allergies  No Known Allergies    ANTIMICROBIALS:  cefepime   IVPB 2000 every 8 hours (10/13-)  vancomycin  IVPB 2000 every 12 hours (10/14-)    MEDICATIONS  (STANDING):  diazepam    Tablet 5 two times a day  PRN  acetaminophen     Tablet .. 650 milliGRAM(s) Oral every 6 hours PRN  ALPRAZolam 0.5 milliGRAM(s) Oral two times a day PRN  bisacodyl 5 milliGRAM(s) Oral every 12 hours PRN  cyclobenzaprine 10 milliGRAM(s) Oral three times a day PRN  lactulose Syrup 20 Gram(s) Oral daily PRN  oxyCODONE    IR 5 milliGRAM(s) Oral every 4 hours PRN  oxyCODONE    IR 10 milliGRAM(s) Oral every 4 hours PRN  polyethylene glycol 3350 17 Gram(s) Oral two times a day PRN  senna 2 Tablet(s) Oral at bedtime PRN    Vital Signs Last 24 Hrs  T(F): 98.1 (10-14-22 @ 08:06), Max: 100.2 (10-13-22 @ 18:22)    Vital Signs Last 24 Hrs  HR: 101 (10-14-22 @ 08:06) (87 - 149)  BP: 126/84 (10-14-22 @ 08:06) (114/79 - 168/86)  RR: 18 (10-14-22 @ 08:06)  SpO2: 97% (10-14-22 @ 08:06) (94% - 100%)  Wt(kg): --    PHYSICAL EXAM:                   10.9   8.53  )-----------( 324      ( 14 Oct 2022 06:52 )             32.4     138  |  102  |  8   ----------------------------<  127<H>  3.9   |  22  |  0.55    Ca    9.0      14 Oct 2022 06:52    TPro  6.8  /  Alb  3.9  /  TBili  0.2  /  DBili  x   /  AST  19  /  ALT  20  /  AlkPhos  57  10-13    Sedimentation Rate, Erythrocyte: 59 (10-13 @ 10:35)  Sedimentation Rate, Erythrocyte: 72 (09-19 @ 15:39)    Procalcitonin, Serum: 0.04 (10-13 @ 10:34)  Procalcitonin, Serum: 0.10 (09-19 @ 16:48)    C-Reactive Protein, Serum: 33 (10-13 @ 10:34)    MICROBIOLOGY:  MRSA PCR Result.: NotDetec (09-19-22 @ 20:28)  MRSA PCR Result.: NotDetec (09-11-22 @ 11:49)    Culture - Blood (collected 21 Sep 2022 07:59)  Source: .Blood Blood-Venous  Final Report:    No Growth Final    Culture - Blood (collected 21 Sep 2022 07:59)  Source: .Blood Blood  Final Report:    No Growth Final    Culture - Blood (collected 18 Sep 2022 22:28)  Source: .Blood Blood-Peripheral  Final Report:    Growth in aerobic bottle: Pseudomonas aeruginosa (Carbapenem Resistant)    ***Blood Panel PCR results on this specimen are available    approximately 3 hours after the Gram stain result.***    Gram stain, PCR, and/or culture results may not always    correspond due to difference in methodologies.    ************************************************************    This PCR assay was performed by multiplex PCR. This    Assay tests for 66 bacterial and resistance gene targets.    Please refer to the [facility] test directory    at [email or web address] for details.  Organism: Blood Culture PCR  Pseudomonas aeruginosa (Carbapenem Resistant)  Organism: Pseudomonas aeruginosa (Carbapenem Resistant)    Sensitivities:      -  Amikacin: S <=16      -  Aztreonam: S <=4      -  Cefepime: S <=2      -  Ceftazidime: S <=1      -  Ceftazidime/Avibactam: S <=4      -  Ceftolozane/tazobactam: S <=2      -  Ciprofloxacin: S <=0.25      -  Gentamicin: S <=2      -  Imipenem: R >8      -  Levofloxacin: S <=0.5      -  Meropenem: I 4      -  Piperacillin/Tazobactam: S <=8      -  Tobramycin: S <=2      Method Type: NADEEM  Organism: Blood Culture PCR    Sensitivities:      -  Pseudomonas aeruginosa: Detec      Method Type: PCR    Culture - Blood (collected 18 Sep 2022 22:17)  Source: .Blood Blood-Peripheral  Final Report:    No Growth Final    HIV-1/2 Combo Result: Nonreact (09-07-22 @ 12:22)    COVID-19 PCR: NotDetec (10-13-22 @ 10:35)  COVID-19 PCR: NotDetec (09-18-22 @ 05:24)  COVID-19 PCR: NotDetec (09-09-22 @ 12:54)    RADIOLOGY:  imaging below personally reviewed    MR Lumbar Spine w/wo IV Cont (10.13.22 @ 16:15) >  Impression: Findings consistent with osteomyelitis and discitis at L5-S1 with a small ventral epidural abscess. There is no abnormal signal involving the endplates of L5 and S1 with a small amount of periosteal   and ventral epidural enhancement since 9/19/2022. Small fluid collection at the level of the laminectomy is nonspecific but may represent and a sterile or infected collection, seroma or a contained leak. There is also   nonspecific enhancement of the nerve in the cauda equina may represent an infectious or inflammatory process.    VA Duplex Lower Ext Vein Scan, Bilat (09.19.22 @ 11:19) >  IMPRESSION:  No evidence of deep venous thrombosis in either lower extremity.    CT Lumbar Spine No Cont (09.18.22 @ 05:41) >  IMPRESSION:  Status post L5 laminectomy with postsurgical changes as described above.    MR Lumbar Spine No Cont (09.11.22 @ 13:12) >  IMPRESSION: Large central and left-sided disc herniation L5-S1 with significant thecal sac compression and extension down behind the S1 vertebral body with a probable sequestered fragment.    MR Lumbar Spine No Cont (09.09.22 @ 21:32) >  IMPRESSION:  Limited exam due to lack of axial imaging. Degenerative changes at L5-S1 with a suspected a large central disc herniation. Axial images through the L5-S1 level can be obtained to complete evaluation.   Patient is a 37y old  Female who presents with a chief complaint of lumbar discitis/osteo (14 Oct 2022 08:55)    HPI:  37F with hx lumbar radiculopathy s/p L5-S1 laminectomy and discectomy (9/13/22 Dr. Patricio) followed by re-admission 9/18/22-9/23/22 complicated by pseudomonas bacteremia (carbapenem resistant) MRI with post surgical collection presents to the ED with back pain.  Received cefepime and was discharged on oral ciprofloxacin to be completed 10/17.   Follow up with Dr. Patricio's office on 10/10 and reported sharp, stabbing back pain. Reports that since prior surgery, she has had numbness in her groin and buttocks. No issues with urinary and bowel incontinence. Presented to the ED because of loss of strength of her legs bilaterally. Denies fevers or chills at home.     In ED, pt afebrile and without leukocytosis. MR significant for ventral epidural abscess. Started on empiric vanc and cefepime, blood cultures drawn. U/A negative. 10/13 pt taken for washout of epidural collection and lumbar wound revision. Drained cloudy fluid, not cornelio pus. Tissue cultures collected.  Pt seen, laying in bed and reports continued back pain after procedure. Reports that she is still having difficulty moving her leg, worse on left side. Has walker which is draining and has yet to have a BM. Reports constipation at home due to chronic pain meds.     REVIEW OF SYSTEMS  [  ] ROS unobtainable because:    [ x ] All other systems negative except as noted below    Constitutional:  [ ] fever [ ] chills  [ ] weight loss  [ ]night sweat  [ ]poor appetite/PO intake [ ]fatigue   Skin:  [ ] rash [ ] phlebitis	  Eyes: [ ] icterus [ ] pain  [ ] discharge	  ENMT: [ ] sore throat  [ ] thrush [ ] ulcers [ ] exudates [ ]anosmia  Respiratory: [ ] dyspnea [ ] hemoptysis [ ] cough [ ] sputum	  Cardiovascular:  [ ] chest pain [ ] palpitations [ ] edema	  Gastrointestinal:  [ ] nausea [ ] vomiting [ ] diarrhea [ ] constipation [ ] pain	  Genitourinary:  [ ] dysuria [ ] frequency [ ] hematuria [ ] discharge [ ] flank pain  [ ] incontinence  Musculoskeletal:  [x ] myalgias [ ] arthralgias [ ] arthritis  [x ] back pain  Neurological:  [ ] headache [x ] weakness [ ] seizures  [ ] confusion/altered mental status    prior hospital charts reviewed [V]  primary team notes reviewed [V]  other consultant notes reviewed [V]    PAST MEDICAL & SURGICAL HISTORY:  Lumbar herniated disc  S/P laminectomy L5-S1 on 9/13/2022    SOCIAL HISTORY:  - Denied smoking/vaping/alcohol/recreational drug use    FAMILY HISTORY:    Allergies  No Known Allergies    ANTIMICROBIALS:  cefepime   IVPB 2000 every 8 hours (10/13-)  vancomycin  IVPB 2000 every 12 hours (10/14-)    MEDICATIONS  (STANDING):  diazepam    Tablet 5 two times a day  PRN  acetaminophen     Tablet .. 650 milliGRAM(s) Oral every 6 hours PRN  ALPRAZolam 0.5 milliGRAM(s) Oral two times a day PRN  bisacodyl 5 milliGRAM(s) Oral every 12 hours PRN  cyclobenzaprine 10 milliGRAM(s) Oral three times a day PRN  lactulose Syrup 20 Gram(s) Oral daily PRN  oxyCODONE    IR 5 milliGRAM(s) Oral every 4 hours PRN  oxyCODONE    IR 10 milliGRAM(s) Oral every 4 hours PRN  polyethylene glycol 3350 17 Gram(s) Oral two times a day PRN  senna 2 Tablet(s) Oral at bedtime PRN    Vital Signs Last 24 Hrs  T(F): 98.1 (10-14-22 @ 08:06), Max: 100.2 (10-13-22 @ 18:22)    Vital Signs Last 24 Hrs  HR: 101 (10-14-22 @ 08:06) (87 - 149)  BP: 126/84 (10-14-22 @ 08:06) (114/79 - 168/86)  RR: 18 (10-14-22 @ 08:06)  SpO2: 97% (10-14-22 @ 08:06) (94% - 100%)  Wt(kg): --    PHYSICAL EXAM:    LOS: 1d    GENERAL: NAD, lying in bed comfortably  HEAD:  Atraumatic, Normocephalic  EYES: EOMI, PERRLA, conjunctiva and sclera clear  ENT: Moist mucous membranes  NECK: Supple, No JVD  CHEST/LUNG: Clear to auscultation bilaterally; No rales, rhonchi, wheezing, or rubs. Unlabored respirations  HEART: Regular rate and rhythm; No murmurs, rubs, or gallops  ABDOMEN: BSx4; Soft, nontender, nondistended  BACK: wound incision C/D/I, draining bloody fluid   EXTREMITIES:  Decreased strength L >R, UE strength 5/5  NERVOUS SYSTEM:  A&Ox3, no focal deficits                10.9   8.53  )-----------( 324      ( 14 Oct 2022 06:52 )             32.4     138  |  102  |  8   ----------------------------<  127<H>  3.9   |  22  |  0.55    Ca    9.0      14 Oct 2022 06:52    TPro  6.8  /  Alb  3.9  /  TBili  0.2  /  DBili  x   /  AST  19  /  ALT  20  /  AlkPhos  57  10-13    Sedimentation Rate, Erythrocyte: 59 (10-13 @ 10:35)  Sedimentation Rate, Erythrocyte: 72 (09-19 @ 15:39)    Procalcitonin, Serum: 0.04 (10-13 @ 10:34)  Procalcitonin, Serum: 0.10 (09-19 @ 16:48)    C-Reactive Protein, Serum: 33 (10-13 @ 10:34)    MICROBIOLOGY:  MRSA PCR Result.: NotDetec (09-19-22 @ 20:28)  MRSA PCR Result.: NotDetec (09-11-22 @ 11:49)    Culture - Blood (collected 21 Sep 2022 07:59)  Source: .Blood Blood-Venous  Final Report:    No Growth Final    Culture - Blood (collected 21 Sep 2022 07:59)  Source: .Blood Blood  Final Report:    No Growth Final    Culture - Blood (collected 18 Sep 2022 22:28)  Source: .Blood Blood-Peripheral  Final Report:    Growth in aerobic bottle: Pseudomonas aeruginosa (Carbapenem Resistant)    ***Blood Panel PCR results on this specimen are available    approximately 3 hours after the Gram stain result.***    Gram stain, PCR, and/or culture results may not always    correspond due to difference in methodologies.    ************************************************************    This PCR assay was performed by multiplex PCR. This    Assay tests for 66 bacterial and resistance gene targets.    Please refer to the [facility] test directory    at [email or web address] for details.  Organism: Blood Culture PCR  Pseudomonas aeruginosa (Carbapenem Resistant)  Organism: Pseudomonas aeruginosa (Carbapenem Resistant)    Sensitivities:      -  Amikacin: S <=16      -  Aztreonam: S <=4      -  Cefepime: S <=2      -  Ceftazidime: S <=1      -  Ceftazidime/Avibactam: S <=4      -  Ceftolozane/tazobactam: S <=2      -  Ciprofloxacin: S <=0.25      -  Gentamicin: S <=2      -  Imipenem: R >8      -  Levofloxacin: S <=0.5      -  Meropenem: I 4      -  Piperacillin/Tazobactam: S <=8      -  Tobramycin: S <=2      Method Type: NADEEM  Organism: Blood Culture PCR    Sensitivities:      -  Pseudomonas aeruginosa: Detec      Method Type: PCR    Culture - Blood (collected 18 Sep 2022 22:17)  Source: .Blood Blood-Peripheral  Final Report:    No Growth Final    HIV-1/2 Combo Result: Nonreact (09-07-22 @ 12:22)    COVID-19 PCR: NotDetec (10-13-22 @ 10:35)  COVID-19 PCR: NotDetec (09-18-22 @ 05:24)  COVID-19 PCR: NotDetec (09-09-22 @ 12:54)    RADIOLOGY:  imaging below personally reviewed    MR Lumbar Spine w/wo IV Cont (10.13.22 @ 16:15) >  Impression: Findings consistent with osteomyelitis and discitis at L5-S1 with a small ventral epidural abscess. There is no abnormal signal involving the endplates of L5 and S1 with a small amount of periosteal   and ventral epidural enhancement since 9/19/2022. Small fluid collection at the level of the laminectomy is nonspecific but may represent and a sterile or infected collection, seroma or a contained leak. There is also   nonspecific enhancement of the nerve in the cauda equina may represent an infectious or inflammatory process.    VA Duplex Lower Ext Vein Scan, Bilat (09.19.22 @ 11:19) >  IMPRESSION:  No evidence of deep venous thrombosis in either lower extremity.    CT Lumbar Spine No Cont (09.18.22 @ 05:41) >  IMPRESSION:  Status post L5 laminectomy with postsurgical changes as described above.    MR Lumbar Spine No Cont (09.11.22 @ 13:12) >  IMPRESSION: Large central and left-sided disc herniation L5-S1 with significant thecal sac compression and extension down behind the S1 vertebral body with a probable sequestered fragment.    MR Lumbar Spine No Cont (09.09.22 @ 21:32) >  IMPRESSION:  Limited exam due to lack of axial imaging. Degenerative changes at L5-S1 with a suspected a large central disc herniation. Axial images through the L5-S1 level can be obtained to complete evaluation.

## 2022-10-14 NOTE — OCCUPATIONAL THERAPY INITIAL EVALUATION ADULT - TRANSFER TRAINING, PT EVAL
pt will perform toilet transfer with S in 4 weeks using LRAD / pt will perform shower transfer to shower chair with S in 4 weeks

## 2022-10-14 NOTE — PHYSICAL THERAPY INITIAL EVALUATION ADULT - PERTINENT HX OF CURRENT PROBLEM, REHAB EVAL
37F with PMH/PSH including lumbar radiculopathy s/p L5-S1 laminectomy and discectomy (9/13/22 Dr. Patricio) (ED 9/27/22, admission 9/9/22-9/15/22. re-admission 9/18/22-9/23/22) complicated by pseudomonas bacteremia (carbapenem resistant) MRI with post surgical collection presents to the ED with back pain. Reports she had previously been able to ambulate with cane but reports she had to strain with a BM a few days ago and since then has had increased pain and difficulty ambulating. The presence of superinfection (epidural phlegmon/abscess) is not entirely excluded. Enhancing soft tissue within the posterior aspect of the spinal canal at the L5-S1 level likely representing granulation/scar tissue.

## 2022-10-14 NOTE — PROGRESS NOTE ADULT - ASSESSMENT
HPI:   37F with PMH/PSH including lumbar radiculopathy s/p L5-S1 laminectomy and discectomy (9/13/22 Dr. Patricio) (ED 9/27/22, admission 9/9/22-9/15/22. re-admission 9/18/22-9/23/22) complicated by pseudomonas bacteremia (carbapenem resistant) MRI with post surgical collection presents to the ED with back pain.  Patient reports that she has been having "pins and needles" to LE since surgery, reports numbness in groin, buttocks, posterior LEs.  Reports she had previously been able to ambulate with cane but reports she had to strain with a BM a few days ago and since then has had increased pain and difficulty ambulating.  Patient reports she has been constipated, taking narcotics for pain control.  Reports was seen recently at Dr. Patricio's office but reports did not have this level of pain at the time of the last visit.  Review of EMR HIE Neurosx note from 10/10/22 reveals patient presented to office with "chief complaint of sharp pain in mid back incision which pulsates after having to strain for bowel movement two days ago."  Mother reports patient was able to ambulate earlier after receiving Fentanyl 100 from EMS.  Mother reports that she has been caring for wound, denies worsening appearance or increased drainage, reports following with plastics (seen initially inpatient 9/23/22, Dr. Carroll) about 1-2 weeks ago and was told to follow up in 4 weeks.  From EMR, MRI L spine w/wo IV contrast (9/19/22) "postsurgical changes emanating from the ventral epidural space occupies approximately 70-75% of the spinal canal diameter. The presence of superinfection (epidural phlegmon/abscess) is not entirely excluded. Enhancing soft tissue within the posterior aspect of the spinal canal at the L5-S1 level likely representing granulation/scar tissue.  Postsurgical seroma at the level of the laminectomy defect measures approximately 1.9 x 3.6 cm. Superinfection is not entirely excluded."  Denies fevers, chills.  Denies loss of urinary or bowel continence. Denies chest pain, shortness of breath, abdominal pain, nausea, vomiting, diarrhea, urinary complaints. (13 Oct 2022 13:07)    PAST MEDICAL & SURGICAL HISTORY:  Lumbar herniated disc      S/P laminectomy  L5-S1 on 9/13/2022    PROCEDURE:  10/14/22 S/P lumbar wound revision and washout of epidural collection, exploration of previous L5-S1 discectomy, skin debridement  POD#1    PLAN:  Neuro: stop oxy start tramadol PRN, decadron x 24hrs only and neurontin given post op left radiculopathy; flexeril ATC; IV tylenol ATC x 24hrs  contine hmvc and monitor output; MRIs with premedication for comfort scheuduled for tonight  Respiratory: patient instructed on incentive spirometer  CV: normotoensive no meds  Heme/Onc: f/u am cbc, stable           DVT ppx: [] SQH [x] SQL and venodynes bilaterally  Renal: cont walker and give TOV in am, IVF  ID: afebrile, f/u OR cultures and cont vanc/ cefepime for now and adjust per culture results; appreciate ID consult  GI: bowel regimen, known chronic constipation, home linzess started   PT/OT: TBD  Mom and significant other at bedisde an dupdated  f/u am labs    discussed with Dr Patricio  [facility] # [id number]

## 2022-10-14 NOTE — PHYSICAL THERAPY INITIAL EVALUATION ADULT - MANUAL MUSCLE TESTING RESULTS, REHAB EVAL
Functionally assessed atleast 3+/5 for B UE and LE while pushing up from bed into sitting. R LE>L LE/grossly assessed due to

## 2022-10-14 NOTE — OCCUPATIONAL THERAPY INITIAL EVALUATION ADULT - PERTINENT HX OF CURRENT PROBLEM, REHAB EVAL
HPI:  37F with hx lumbar radiculopathy s/p L5-S1 laminectomy and discectomy (9/13/22 Dr. Patricio) followed by re-admission 9/18/22-9/23/22 complicated by pseudomonas bacteremia (carbapenem resistant) MRI with post surgical collection presents to the ED with back pain.  Received cefepime and was discharged on oral ciprofloxacin to be completed 10/17.   Follow up with Dr. Patricio's office on 10/10 and reported sharp, stabbing back pain. Reports that since prior surgery, she has had numbness in her groin and buttocks. No issues with urinary and bowel incontinence. Presented to the ED because of loss of strength of her legs bilaterally. Denies fevers or chills at home. In ED, pt afebrile and without leukocytosis. MR significant for ventral epidural abscess. Started on empiric vanc and cefepime, blood cultures drawn. U/A negative. 10/13 pt taken for washout of epidural collection and lumbar wound revision. Drained cloudy fluid, not cornelio pus. Tissue cultures collected. Pt seen, laying in bed and reports continued back pain after procedure. Reports that she is still having difficulty moving her leg, worse on left side. Has walker which is draining and has yet to have a BM. Reports constipation at home due to chronic pain meds. now s/p lumbar wound revision and washout of epidural collection, exploration of previous L5-S1 discectomy, skin debridement 10/13

## 2022-10-14 NOTE — CONSULT NOTE ADULT - ASSESSMENT
Raj Walleri 37F s/p L5 lami L5/S1 (9/13) with re-admission for pseudomonas bacteremia cleared on BCx prior to DC on cipro 750mg BID (ending 10/17).  Seen in office 10/10 for increased pain after BM. Put patient  planned. Presented to ED for increased pain, midback. Normotensive, tachycardic, afebrile. Wound looks good: clean, dry, intact. Exam: AOx3, BUE 5/5 lowers pain limited, TTP, reports stable genital numbness.    -ESR, CRP, Procal   -MR L spine ww/o w/DWI   -CDU stay if possible to complete MR, else adm medicine for pain control q4 neurochecks    
37F with PMH/PSH including lumbar radiculopathy s/p L5-S1 laminectomy and discectomy (9/13/22 Dr. Patricio) (ED 9/27/22, admission 9/9/22-9/15/22. re-admission 9/18/22-9/23/22) complicated by pseudomonas bacteremia (carbapenem resistant) MRI with post surgical collection presents to the ED with epidural abscess on MRI s/p lumbar wound revision and washout of epidural collection. ID consulted for antibiotic management.     Lumbar radiculopathy s/p laminectomy and discectomy   Spinal epidural abscess   Hx Pseudomonas bacteremia

## 2022-10-14 NOTE — PROGRESS NOTE ADULT - ASSESSMENT
37F s/p L5 lami L5/S1 (9/13) with re-admission for pseudomonas bacteremia cleared on BCx prior to DC on cipro 750mg BID (ending 10/17).  Seen in office 10/10 for increased pain after BM. Put patient MR loredo Presented to ED for increased pain, midback. Normotensive, tachycardic, afebrile. Wound looks good: clean, dry, intact. Exam: AOx3, BUE 5/5 lowers pain limited, TTP, reports stable genital numbness.      back pain   - epidural abscrss  - POD#1 from wound debridment and cultures.  - mild weakness left DF 4/5 with some decreased sensation dorsum   - Cx from OR pending  -  abx as per ID consult   - PT to see today    recent pseudomonas bacteremia  - abx as per ID  - follow up cultures    dvt px  - lovenox

## 2022-10-15 LAB
ALBUMIN SERPL ELPH-MCNC: 3.9 G/DL — SIGNIFICANT CHANGE UP (ref 3.3–5)
ALP SERPL-CCNC: 60 U/L — SIGNIFICANT CHANGE UP (ref 40–120)
ALT FLD-CCNC: 15 U/L — SIGNIFICANT CHANGE UP (ref 10–45)
ANION GAP SERPL CALC-SCNC: 12 MMOL/L — SIGNIFICANT CHANGE UP (ref 5–17)
AST SERPL-CCNC: 9 U/L — LOW (ref 10–40)
BILIRUB SERPL-MCNC: 0.1 MG/DL — LOW (ref 0.2–1.2)
BUN SERPL-MCNC: 11 MG/DL — SIGNIFICANT CHANGE UP (ref 7–23)
CALCIUM SERPL-MCNC: 9.1 MG/DL — SIGNIFICANT CHANGE UP (ref 8.4–10.5)
CHLORIDE SERPL-SCNC: 102 MMOL/L — SIGNIFICANT CHANGE UP (ref 96–108)
CO2 SERPL-SCNC: 24 MMOL/L — SIGNIFICANT CHANGE UP (ref 22–31)
CREAT SERPL-MCNC: 0.61 MG/DL — SIGNIFICANT CHANGE UP (ref 0.5–1.3)
EGFR: 119 ML/MIN/1.73M2 — SIGNIFICANT CHANGE UP
GLUCOSE SERPL-MCNC: 185 MG/DL — HIGH (ref 70–99)
HCT VFR BLD CALC: 32.8 % — LOW (ref 34.5–45)
HGB BLD-MCNC: 11 G/DL — LOW (ref 11.5–15.5)
MCHC RBC-ENTMCNC: 28.4 PG — SIGNIFICANT CHANGE UP (ref 27–34)
MCHC RBC-ENTMCNC: 33.5 GM/DL — SIGNIFICANT CHANGE UP (ref 32–36)
MCV RBC AUTO: 84.8 FL — SIGNIFICANT CHANGE UP (ref 80–100)
MRSA PCR RESULT.: SIGNIFICANT CHANGE UP
NRBC # BLD: 0 /100 WBCS — SIGNIFICANT CHANGE UP (ref 0–0)
PLATELET # BLD AUTO: 376 K/UL — SIGNIFICANT CHANGE UP (ref 150–400)
POTASSIUM SERPL-MCNC: 3.8 MMOL/L — SIGNIFICANT CHANGE UP (ref 3.5–5.3)
POTASSIUM SERPL-SCNC: 3.8 MMOL/L — SIGNIFICANT CHANGE UP (ref 3.5–5.3)
PROT SERPL-MCNC: 7.1 G/DL — SIGNIFICANT CHANGE UP (ref 6–8.3)
RBC # BLD: 3.87 M/UL — SIGNIFICANT CHANGE UP (ref 3.8–5.2)
RBC # FLD: 12 % — SIGNIFICANT CHANGE UP (ref 10.3–14.5)
S AUREUS DNA NOSE QL NAA+PROBE: SIGNIFICANT CHANGE UP
SODIUM SERPL-SCNC: 138 MMOL/L — SIGNIFICANT CHANGE UP (ref 135–145)
VANCOMYCIN TROUGH SERPL-MCNC: 9.5 UG/ML — LOW (ref 10–20)
WBC # BLD: 10.48 K/UL — SIGNIFICANT CHANGE UP (ref 3.8–10.5)
WBC # FLD AUTO: 10.48 K/UL — SIGNIFICANT CHANGE UP (ref 3.8–10.5)

## 2022-10-15 RX ORDER — MINERAL OIL
133 OIL (ML) MISCELLANEOUS ONCE
Refills: 0 | Status: DISCONTINUED | OUTPATIENT
Start: 2022-10-16 | End: 2022-10-18

## 2022-10-15 RX ORDER — POLYETHYLENE GLYCOL 3350 17 G/17G
17 POWDER, FOR SOLUTION ORAL
Refills: 0 | Status: DISCONTINUED | OUTPATIENT
Start: 2022-10-15 | End: 2022-10-18

## 2022-10-15 RX ORDER — MULTIVIT WITH MIN/MFOLATE/K2 340-15/3 G
1 POWDER (GRAM) ORAL ONCE
Refills: 0 | Status: DISCONTINUED | OUTPATIENT
Start: 2022-10-15 | End: 2022-10-15

## 2022-10-15 RX ORDER — MINERAL OIL
133 OIL (ML) MISCELLANEOUS ONCE
Refills: 0 | Status: COMPLETED | OUTPATIENT
Start: 2022-10-15 | End: 2022-10-15

## 2022-10-15 RX ADMIN — Medication 300 MILLIGRAM(S): at 09:00

## 2022-10-15 RX ADMIN — SIMETHICONE 80 MILLIGRAM(S): 80 TABLET, CHEWABLE ORAL at 22:49

## 2022-10-15 RX ADMIN — Medication 1000 MILLIGRAM(S): at 00:14

## 2022-10-15 RX ADMIN — GABAPENTIN 200 MILLIGRAM(S): 400 CAPSULE ORAL at 21:28

## 2022-10-15 RX ADMIN — CYCLOBENZAPRINE HYDROCHLORIDE 10 MILLIGRAM(S): 10 TABLET, FILM COATED ORAL at 13:25

## 2022-10-15 RX ADMIN — TRAMADOL HYDROCHLORIDE 50 MILLIGRAM(S): 50 TABLET ORAL at 23:18

## 2022-10-15 RX ADMIN — CYCLOBENZAPRINE HYDROCHLORIDE 10 MILLIGRAM(S): 10 TABLET, FILM COATED ORAL at 06:23

## 2022-10-15 RX ADMIN — Medication 300 MILLIGRAM(S): at 22:13

## 2022-10-15 RX ADMIN — CEFEPIME 100 MILLIGRAM(S): 1 INJECTION, POWDER, FOR SOLUTION INTRAMUSCULAR; INTRAVENOUS at 06:25

## 2022-10-15 RX ADMIN — Medication 4 MILLIGRAM(S): at 11:22

## 2022-10-15 RX ADMIN — ENOXAPARIN SODIUM 40 MILLIGRAM(S): 100 INJECTION SUBCUTANEOUS at 17:11

## 2022-10-15 RX ADMIN — Medication 133 MILLILITER(S): at 16:09

## 2022-10-15 RX ADMIN — GABAPENTIN 200 MILLIGRAM(S): 400 CAPSULE ORAL at 06:23

## 2022-10-15 RX ADMIN — TRAMADOL HYDROCHLORIDE 50 MILLIGRAM(S): 50 TABLET ORAL at 22:48

## 2022-10-15 RX ADMIN — Medication 4 MILLIGRAM(S): at 06:24

## 2022-10-15 RX ADMIN — CEFEPIME 100 MILLIGRAM(S): 1 INJECTION, POWDER, FOR SOLUTION INTRAMUSCULAR; INTRAVENOUS at 13:24

## 2022-10-15 RX ADMIN — CYCLOBENZAPRINE HYDROCHLORIDE 10 MILLIGRAM(S): 10 TABLET, FILM COATED ORAL at 21:28

## 2022-10-15 RX ADMIN — CEFEPIME 100 MILLIGRAM(S): 1 INJECTION, POWDER, FOR SOLUTION INTRAMUSCULAR; INTRAVENOUS at 20:37

## 2022-10-15 RX ADMIN — CHLORHEXIDINE GLUCONATE 1 APPLICATION(S): 213 SOLUTION TOPICAL at 11:20

## 2022-10-15 RX ADMIN — GABAPENTIN 200 MILLIGRAM(S): 400 CAPSULE ORAL at 13:25

## 2022-10-15 RX ADMIN — SENNA PLUS 2 TABLET(S): 8.6 TABLET ORAL at 21:27

## 2022-10-15 RX ADMIN — LINACLOTIDE 290 MICROGRAM(S): 145 CAPSULE, GELATIN COATED ORAL at 06:26

## 2022-10-15 RX ADMIN — Medication 400 MILLIGRAM(S): at 08:24

## 2022-10-15 NOTE — PROGRESS NOTE ADULT - SUBJECTIVE AND OBJECTIVE BOX
DATE OF SERVICE: 10-15-22 @ 11:26    Patient is a 37y old  Female who presents with a chief complaint of 10/14/22 S/P lumbar wound revision and washout of epidural collection, exploration of previous L5-S1 discectomy, skin debridement (14 Oct 2022 16:25)      SUBJECTIVE / OVERNIGHT EVENTS:  c/o constipation. paresthesia and weakness of LLE has improved    MEDICATIONS  (STANDING):  cefepime   IVPB 2000 milliGRAM(s) IV Intermittent every 8 hours  chlorhexidine 2% Cloths 1 Application(s) Topical daily  cyclobenzaprine 10 milliGRAM(s) Oral three times a day  enoxaparin Injectable 40 milliGRAM(s) SubCutaneous <User Schedule>  gabapentin 200 milliGRAM(s) Oral every 8 hours  influenza   Vaccine 0.5 milliLiter(s) IntraMuscular once  lidocaine   4% Patch 1 Patch Transdermal every 24 hours  linaclotide 290 MICROGram(s) Oral before breakfast  magnesium citrate Oral Solution 1 Bottle Oral once  mineral oil enema 133 milliLiter(s) Rectal once  senna 2 Tablet(s) Oral at bedtime  vancomycin  IVPB 1500 milliGRAM(s) IV Intermittent every 12 hours    MEDICATIONS  (PRN):  acetaminophen     Tablet .. 650 milliGRAM(s) Oral every 6 hours PRN Mild Pain (1 - 3)  ALPRAZolam 0.5 milliGRAM(s) Oral every 8 hours PRN anxiety  bisacodyl 5 milliGRAM(s) Oral every 12 hours PRN Constipation  lactulose Syrup 20 Gram(s) Oral daily PRN Constiation  polyethylene glycol 3350 17 Gram(s) Oral two times a day PRN Constipation  simethicone 80 milliGRAM(s) Chew every 4 hours PRN Upset Stomach  traMADol 50 milliGRAM(s) Oral every 4 hours PRN Moderate Pain (4 - 6)      Vital Signs Last 24 Hrs  T(C): 36.9 (15 Oct 2022 08:13), Max: 37.2 (14 Oct 2022 20:33)  T(F): 98.4 (15 Oct 2022 08:13), Max: 98.9 (14 Oct 2022 20:33)  HR: 88 (15 Oct 2022 08:13) (88 - 125)  BP: 144/88 (15 Oct 2022 08:13) (111/76 - 144/88)  BP(mean): --  RR: 18 (15 Oct 2022 08:13) (18 - 18)  SpO2: 95% (15 Oct 2022 08:13) (93% - 97%)    Parameters below as of 15 Oct 2022 08:13  Patient On (Oxygen Delivery Method): room air      CAPILLARY BLOOD GLUCOSE        I&O's Summary    14 Oct 2022 07:01  -  15 Oct 2022 07:00  --------------------------------------------------------  IN: 660 mL / OUT: 5525 mL / NET: -4865 mL    15 Oct 2022 07:01  -  15 Oct 2022 11:26  --------------------------------------------------------  IN: 0 mL / OUT: 450 mL / NET: -450 mL        PHYSICAL EXAM:  GENERAL: NAD, well-developed  HEAD:  Atraumatic, Normocephalic  EYES: EOMI, PERRLA, conjunctiva and sclera clear  NECK: Supple, No JVD  CHEST/LUNG: Clear to auscultation bilaterally; No wheeze  HEART: Regular rate and rhythm; No murmurs, rubs, or gallops  ABDOMEN: Soft, Nontender, Nondistended; Bowel sounds present  EXTREMITIES:  2+ Peripheral Pulses, No clubbing, cyanosis, or edema  PSYCH: AAOx3  NEUROLOGY: non-focal  SKIN: No rashes or lesions    LABS:                        11.0   10.48 )-----------( 376      ( 15 Oct 2022 07:46 )             32.8     10-15    138  |  102  |  11  ----------------------------<  185<H>  3.8   |  24  |  0.61    Ca    9.1      15 Oct 2022 07:42    TPro  7.1  /  Alb  3.9  /  TBili  0.1<L>  /  DBili  x   /  AST  9<L>  /  ALT  15  /  AlkPhos  60  10-15      < from: MR Cervical Spine w/wo IV Cont (10.14.22 @ 23:40) >  INTERPRETATION:  CLINICAL INDICATION: Recent washout of lumbar   laminectomy site, possible osteomyelitis discitis    Magnetic resonance imaging of the cervical and thoracic spine was carried   out with sagittal surface coil imaging from C1 to L1-2 using T1 and fast   spin echo T2 weighted images with and without fat saturation technique   with axial T1 and fast spin echo T2 weighted imaging on a 3.0 Christina   magnet. Post contrast axial and sagittal T1 weighted images were   obtained. 10 cc of Gadavist were intravenously injected, 0 cc were   discarded.    The cervical vertebrae are normal in height and signal intensity. There   are small disc osteophyte complexes versus bulges at C5-6 and C6-7. There   is no cord compression or abnormal cord signal. No abnormal abdominal   involving the vertebral bodies with discs is are identified. There are no   epidural collections. After contrast saturation there is normal osseous   and vascular enhancement.    Evaluation of the thoracic spine demonstrates the thoracic vertebral   bodies to be normal in height and signal intensity. No abnormal signal or   enhancement is identified on the cord 2 level. After contrast   administration is normal osseous and vascular enhancement.    IMPRESSION: Small disc osteophyte complexes versus bulges C5-6 and C6-7.   No cord compression. No abnormal signal to suggest infection involving   the cervical and thoracic spine. No abnormal enhancement.      < end of copied text >          RADIOLOGY & ADDITIONAL TESTS:    Imaging Personally Reviewed:    Consultant(s) Notes Reviewed:      Care Discussed with Consultants/Other Providers:

## 2022-10-15 NOTE — PROGRESS NOTE ADULT - ASSESSMENT
37F with PMH/PSH including lumbar radiculopathy s/p L5-S1 laminectomy and discectomy (9/13/22 Dr. Patricio) (ED 9/27/22, admission 9/9/22-9/15/22. re-admission 9/18/22-9/23/22) complicated by pseudomonas bacteremia (carbapenem resistant) MRI with post surgical collection presents to the ED with back pain.  Patient reports that she has been having "pins and needles" to LE since surgery, reports numbness in groin, buttocks, posterior LEs.  Reports she had previously been able to ambulate with cane but reports she had to strain with a BM a few days ago and since then has had increased pain and difficulty ambulating.  Patient reports she has been constipated, taking narcotics for pain control.  Reports was seen recently at Dr. Patricio's office but reports did not have this level of pain at the time of the last visit.  Review of EMR HIE Neurosx note from 10/10/22 reveals patient presented to office with "chief complaint of sharp pain in mid back incision which pulsates after having to strain for bowel movement two days ago."  Mother reports patient was able to ambulate earlier after receiving Fentanyl 100 from EMS.  Mother reports that she has been caring for wound, denies worsening appearance or increased drainage, reports following with plastics (seen initially inpatient 9/23/22, Dr. Carroll) about 1-2 weeks ago and was told to follow up in 4 weeks.  From EMR, MRI L spine w/wo IV contrast (9/19/22) "postsurgical changes emanating from the ventral epidural space occupies approximately 70-75% of the spinal canal diameter. The presence of superinfection (epidural phlegmon/abscess) is not entirely excluded. Enhancing soft tissue within the posterior aspect of the spinal canal at the L5-S1 level likely representing granulation/scar tissue.  Postsurgical seroma at the level of the laminectomy defect measures approximately 1.9 x 3.6 cm. Superinfection is not entirely excluded."  Denies fevers, chills.  Denies loss of urinary or bowel continence. Denies chest pain, shortness of breath, abdominal pain, nausea, vomiting, diarrhea, urinary complaints. (13 Oct 2022 13:07)    PROCEDURE: Adm 10/13  Wound washout and re-explor prior lami site L5-S1      POD#32/2    PLAN:  Neuro: Cont HMV drain.  10/15 Taylor DC'd to [facility]. Abx per ID, likely need 6wks Abx and PICC line once Cx resulted-FU ID. Monitor Vanco Level. Dex x 4doses and stop. Leukocytosis trending up-reactive from steroids and infection. Inc activity/OOB. PT/OT TBD. Dispo once drain out and Cx resulted.    ID note of 10/14-37F with hx lumbar radiculopathy s/p L5-S1 laminectomy and discectomy on 9/13/22.  Readmitted 9/18/22-9/23/22 for increasing back pain. Found to have pseudomonas bacteremia (carbapenem resistant) MRI with post surgical seroma.  She was treated with IV cefepime and discharged on oral ciprofloxacin which she was to complete 10/17.  Returned again 10/13 as she lost strength to her legs.  Here, MRI noted epidural collection.  She was taken to the OR and underwent washout of epidural collection and lumbar wound revision. Drained cloudy fluid, not cornelio pus. Tissue cultures collected.Epidural abscess- suspect pseudomonas aeruginosa given recent bacteremia with PA- will cover for possible staph/MRSA with vancomycin - f/u cultures- can lower vancomycin 1500mg IV q12 for now- monitor vancomycin trough- will likely need 6 weeks IV antibiotics Please call the ID service [phone] with questions or concerns over the weekend .    Medicine note of 101/4-back pain - epidural abscrss- POD#1 from wound debridment and cultures.- mild weakness left DF 4/5 with some decreased sensation dorsum - Cx from OR pending-  abx as per ID consult - PT to see today recent pseudomonas bacteremia - abx as per ID- follow up cultures dvt px - lovenox Electronic Signatures:Beatris Carrillo (MD)     Respiratory: Patient instructed to use incentive spirometer [ X] YES [ ] NO              DVT ppx: [X ] SQL [ ] SQH and Venodynes [ ] Left [ ] Right [ X] Bilateral    Discharge Planning:  PT/OT eval-P FU.  She was subsequently DC on >>> in stable condition.    More than 30 minutes spent on total encounter: more than 50% of the visit was spent on educating the patient and family regarding condition, medications, follow up plans, signs and symptoms to be concerned with, preparing paperwork, and questions answered regarding discharge.

## 2022-10-15 NOTE — PROGRESS NOTE ADULT - ASSESSMENT
37F s/p L5 lami L5/S1 (9/13) with re-admission for pseudomonas bacteremia cleared on BCx prior to DC on cipro 750mg BID (ending 10/17).  Seen in office 10/10 for increased pain after BM. Put patient MR loredo Presented to ED for increased pain, midback. Normotensive, tachycardic, afebrile. Wound looks good: clean, dry, intact. Exam: AOx3, BUE 5/5 lowers pain limited, TTP, reports stable genital numbness.      back pain   - epidural abscess  - POD#2 from wound debridment and cultures.  - mild weakness left DF 4/5 with some decreased sensation dorsum improved   - Cx from OR pending NGTD  -  abx as per ID consult   - PT to see     recent pseudomonas bacteremia  - abx as per ID  - follow up cultures    constipation  - lactulose has not worked in the past  - magcitrate x 1  - fleet oil enema x 1    dvt px  - lovenox

## 2022-10-15 NOTE — PROGRESS NOTE ADULT - SUBJECTIVE AND OBJECTIVE BOX
SUBJECTIVE: Doing well, NAD. Seems like LLE numbness sl worst postop.    OVERNIGHT EVENTS: None    Vital Signs Last 24 Hrs  T(C): 36.9 (15 Oct 2022 08:13), Max: 37.2 (14 Oct 2022 20:33)  T(F): 98.4 (15 Oct 2022 08:13), Max: 98.9 (14 Oct 2022 20:33)  HR: 88 (15 Oct 2022 08:13) (88 - 125)  BP: 144/88 (15 Oct 2022 08:13) (111/76 - 144/88)  BP(mean): --  RR: 18 (15 Oct 2022 08:13) (18 - 18)  SpO2: 95% (15 Oct 2022 08:13) (93% - 97%)    Parameters below as of 15 Oct 2022 08:13  Patient On (Oxygen Delivery Method): room air    IVF: [X ] IVL [ ] NS+K@   DIET: [ X] Regular [ ] CCD [ ] Renal [ ] Puree [ ] Dysphagia [ ] Tube Feeds:   PCA: [ ] YES [ ] NO   ARSMTRONG: [ X] YES [ ] NO [ ] VOID DC'd 10/15  BM: [ ] YES [X ] NO     DRAINS: [ ] JYOTI (cc/24h) [ X] HMV 25 (cc/24h)    PHYSICAL EXAM:    Constitutional: No Acute Distress     Neurological: AOx3, Following Commands, Moving all Extremities     Motor exam:          Upper extremity                         Delt     Bicep     Tricep    HG                                                 R         5/5        5/5        5/5       5/5                                               L          5/5        5/5        5/5       5/5          Lower extremity                        HF         KF        KE       DF         PF                                                  R        5/5        5/5      5/5      5/5     4+/5                                                 Sensation: [X] intact to light touch  [X] decreased: Lt foot    Pulmonary: Clear to Auscultation, No rales, No rhonchi, No wheezes     Cardiovascular: S1, S2, Regular rate and rhythm     Gastrointestinal: Soft, Non-tender, Non-distended     Extremities: No calf tenderness     Incision: +sutures. HMV active. CDI/Flat    LABS:                        11.0   10.48 )-----------( 376      ( 15 Oct 2022 07:46 )             32.8    10-15    138  |  102  |  11  ----------------------------<  185<H>  3.8   |  24  |  0.61    Ca    9.1      15 Oct 2022 07:42    TPro  7.1  /  Alb  3.9  /  TBili  0.1<L>  /  DBili  x   /  AST  9<L>  /  ALT  15  /  AlkPhos  60  10-15    Vancomycin Level, Trough - Prior to Next Dose (10.15.22 @ 07:49)    Vancomycin Level, Trough: 9.5: Vancomycin trough levels should be rapidly reached and maintained at  15-20 ug/ml    Culture - Tissue with Gram Stain (10.14.22 @ 00:05)    Gram Stain:   No polymorphonuclear leukocytes per low power field  No organisms seen per oil power field    Specimen Source: .Tissue Surgical Swab    Culture - Blood (10.13.22 @ 10:10)    Specimen Source: .Blood Blood-Peripheral    Culture Results:   No growth to date.    COVID-19 PCR: NotDetec (13 Oct 2022 10:35)  COVID-19 PCR: NotDetec (18 Sep 2022 05:24)  COVID-19 PCR: NotDetec (09 Sep 2022 12:54)    IMAGING:  < from: MR Thoracic Spine w/wo IV Cont (10.14.22 @ 23:41) >  IMPRESSION: Small disc osteophyte complexes versus bulges C5-6 and C6-7.   No cord compression. No abnormal signal to suggest infection involving   the cervical and thoracic spine. No abnormal enhancement.    < end of copied text >  < from: MR Lumbar Spine w/wo IV Cont (10.13.22 @ 16:15) >    Impression: Findings consistent with osteomyelitis and discitis at L5-S1   with a small ventral epidural abscess. There is no abnormal signal   involving the endplates of L5 and S1 with a small amount of periosteal   and ventral epidural enhancement since 9/19/2022. Small fluid collection   at the level of the laminectomy is nonspecific but may represent and a   sterile or infected collection, seroma or a contained leak. There is also   nonspecific enhancement of the nerve in the cauda equina may represent an   infectious or inflammatory process.    < end of copied text >    MEDICATIONS  (STANDING):  cefepime   IVPB 2000 milliGRAM(s) IV Intermittent every 8 hours  chlorhexidine 2% Cloths 1 Application(s) Topical daily  cyclobenzaprine 10 milliGRAM(s) Oral three times a day  dexAMETHasone  Injectable 4 milliGRAM(s) IV Push every 6 hours  enoxaparin Injectable 40 milliGRAM(s) SubCutaneous <User Schedule>  gabapentin 200 milliGRAM(s) Oral every 8 hours  influenza   Vaccine 0.5 milliLiter(s) IntraMuscular once  lidocaine   4% Patch 1 Patch Transdermal every 24 hours  linaclotide 290 MICROGram(s) Oral before breakfast  senna 2 Tablet(s) Oral at bedtime  vancomycin  IVPB 1500 milliGRAM(s) IV Intermittent every 12 hours    MEDICATIONS  (PRN):  acetaminophen     Tablet .. 650 milliGRAM(s) Oral every 6 hours PRN Mild Pain (1 - 3)  ALPRAZolam 0.5 milliGRAM(s) Oral every 8 hours PRN anxiety  bisacodyl 5 milliGRAM(s) Oral every 12 hours PRN Constipation  lactulose Syrup 20 Gram(s) Oral daily PRN Constiation  polyethylene glycol 3350 17 Gram(s) Oral two times a day PRN Constipation  simethicone 80 milliGRAM(s) Chew every 4 hours PRN Upset Stomach  traMADol 50 milliGRAM(s) Oral every 4 hours PRN Moderate Pain (4 - 6)

## 2022-10-16 LAB — VANCOMYCIN TROUGH SERPL-MCNC: 18.3 UG/ML — SIGNIFICANT CHANGE UP (ref 10–20)

## 2022-10-16 PROCEDURE — 99232 SBSQ HOSP IP/OBS MODERATE 35: CPT

## 2022-10-16 RX ORDER — ACETAMINOPHEN 500 MG
1000 TABLET ORAL ONCE
Refills: 0 | Status: COMPLETED | OUTPATIENT
Start: 2022-10-16 | End: 2022-10-16

## 2022-10-16 RX ADMIN — Medication 1000 MILLIGRAM(S): at 16:08

## 2022-10-16 RX ADMIN — SENNA PLUS 2 TABLET(S): 8.6 TABLET ORAL at 21:12

## 2022-10-16 RX ADMIN — GABAPENTIN 200 MILLIGRAM(S): 400 CAPSULE ORAL at 21:11

## 2022-10-16 RX ADMIN — CYCLOBENZAPRINE HYDROCHLORIDE 10 MILLIGRAM(S): 10 TABLET, FILM COATED ORAL at 21:12

## 2022-10-16 RX ADMIN — POLYETHYLENE GLYCOL 3350 17 GRAM(S): 17 POWDER, FOR SOLUTION ORAL at 05:43

## 2022-10-16 RX ADMIN — TRAMADOL HYDROCHLORIDE 50 MILLIGRAM(S): 50 TABLET ORAL at 14:03

## 2022-10-16 RX ADMIN — CEFEPIME 100 MILLIGRAM(S): 1 INJECTION, POWDER, FOR SOLUTION INTRAMUSCULAR; INTRAVENOUS at 05:44

## 2022-10-16 RX ADMIN — LACTULOSE 20 GRAM(S): 10 SOLUTION ORAL at 05:49

## 2022-10-16 RX ADMIN — ENOXAPARIN SODIUM 40 MILLIGRAM(S): 100 INJECTION SUBCUTANEOUS at 17:51

## 2022-10-16 RX ADMIN — LINACLOTIDE 290 MICROGRAM(S): 145 CAPSULE, GELATIN COATED ORAL at 06:30

## 2022-10-16 RX ADMIN — GABAPENTIN 200 MILLIGRAM(S): 400 CAPSULE ORAL at 13:50

## 2022-10-16 RX ADMIN — Medication 300 MILLIGRAM(S): at 07:58

## 2022-10-16 RX ADMIN — Medication 300 MILLIGRAM(S): at 21:11

## 2022-10-16 RX ADMIN — TRAMADOL HYDROCHLORIDE 50 MILLIGRAM(S): 50 TABLET ORAL at 13:33

## 2022-10-16 RX ADMIN — Medication 400 MILLIGRAM(S): at 15:53

## 2022-10-16 RX ADMIN — TRAMADOL HYDROCHLORIDE 50 MILLIGRAM(S): 50 TABLET ORAL at 23:00

## 2022-10-16 RX ADMIN — CEFEPIME 100 MILLIGRAM(S): 1 INJECTION, POWDER, FOR SOLUTION INTRAMUSCULAR; INTRAVENOUS at 20:10

## 2022-10-16 RX ADMIN — GABAPENTIN 200 MILLIGRAM(S): 400 CAPSULE ORAL at 05:44

## 2022-10-16 RX ADMIN — TRAMADOL HYDROCHLORIDE 50 MILLIGRAM(S): 50 TABLET ORAL at 22:16

## 2022-10-16 RX ADMIN — CYCLOBENZAPRINE HYDROCHLORIDE 10 MILLIGRAM(S): 10 TABLET, FILM COATED ORAL at 13:50

## 2022-10-16 RX ADMIN — CHLORHEXIDINE GLUCONATE 1 APPLICATION(S): 213 SOLUTION TOPICAL at 11:48

## 2022-10-16 RX ADMIN — CEFEPIME 100 MILLIGRAM(S): 1 INJECTION, POWDER, FOR SOLUTION INTRAMUSCULAR; INTRAVENOUS at 11:48

## 2022-10-16 RX ADMIN — CYCLOBENZAPRINE HYDROCHLORIDE 10 MILLIGRAM(S): 10 TABLET, FILM COATED ORAL at 05:44

## 2022-10-16 NOTE — PROGRESS NOTE ADULT - SUBJECTIVE AND OBJECTIVE BOX
Patient is a 37y old  Female who presents with a chief complaint of 10/14/22 S/P lumbar wound revision and washout of epidural collection, exploration of previous L5-S1 discectomy, skin debridement (14 Oct 2022 16:25)    Being followed by ID for Vertebral osteomyelitis    Interval history:  Decreased WBC  Afebrile  No acute events      ROS: Improved LLE weakness and paresthesia  No cough, SOB, CP  No N/V/D./abd pain  No other complaints      Antimicrobials:    cefepime   IVPB 2000 milliGRAM(s) IV Intermittent every 8 hours  vancomycin  IVPB 1500 milliGRAM(s) IV Intermittent every 12 hours      Vital Signs Last 24 Hrs  T(C): 36.8 (10-16-22 @ 05:17), Max: 36.9 (10-15-22 @ 08:13)  T(F): 98.2 (10-16-22 @ 05:17), Max: 98.5 (10-15-22 @ 21:10)  HR: 99 (10-16-22 @ 05:17) (88 - 99)  BP: 122/78 (10-16-22 @ 05:17) (103/67 - 144/88)  BP(mean): --  RR: 18 (10-16-22 @ 05:17) (18 - 18)  SpO2: 95% (10-16-22 @ 05:17) (94% - 96%)    Physical Exam:    Constitutional well preserved, NAD    HEENT EOMI, No pallor or icterus    No oral exudate or erythema    Neck supple no LN    Chest Clear to auscultation    CVS S1 S2 WNl No murmur or rub or gallop    Abd soft BS normal No tenderness no masses    Ext No cyanosis clubbing or edema    IV site no erythema tenderness or discharge    Joints no swelling or LOM    CNS AAO X 3 non focal    Lab Data:                          11.0   10.48 )-----------( 376      ( 15 Oct 2022 07:46 )             32.8       10-15    138  |  102  |  11  ----------------------------<  185<H>  3.8   |  24  |  0.61    Ca    9.1      15 Oct 2022 07:42    TPro  7.1  /  Alb  3.9  /  TBili  0.1<L>  /  DBili  x   /  AST  9<L>  /  ALT  15  /  AlkPhos  60  10-15        .Tissue Surgical Swab  10-14-22   No growth  --    No polymorphonuclear leukocytes per low power field  No organisms seen per oil power field      .Surgical Swab Surgical Swab  10-14-22   No growth  --  --      .Surgical Swab Surgical Swab  10-13-22   No growth  --  --      .Tissue Surgical Swab  10-13-22   No growth  --    No polymorphonuclear leukocytes per low power field  No organisms seen per oil power field      .Tissue Surgical Swab  10-13-22   No growth  --    Few polymorphonuclear leukocytes per low power field  No organisms seen per oil power field      .Surgical Swab Surgical Swab  10-13-22   No growth  --  --      .Surgical Swab Surgical Swab  10-13-22   No growth  --  --      .Surgical Swab Surgical Swab  10-13-22   No growth  --  --      .Blood Blood-Peripheral  10-13-22   No growth to date.  --  --      .Blood Blood-Peripheral  10-13-22   No growth to date.  --  --      Vancomycin Level, Trough: 9.5 ug/mL (10-15-22 @ 07:49)    < from: MR Thoracic Spine w/wo IV Cont (10.14.22 @ 23:41) >  ACC: [id number] EXAM:  MR SPINE CERVICAL WAW IC                        ACC: [id number] EXAM:  MR SPINE THORACIC WAW IC                          PROCEDURE DATE:  10/14/2022          INTERPRETATION:  CLINICAL INDICATION: Recent washout of lumbar   laminectomy site, possible osteomyelitis discitis    Magnetic resonance imaging of the cervical and thoracic spine was carried   out with sagittal surface coil imaging from C1 to L1-2 using T1 and fast   spin echo T2 weighted images with and without fat saturation technique   with axial T1 and fast spin echo T2 weighted imaging on a 3.0 Christina   magnet. Post contrast axial and sagittal T1 weighted images were   obtained. 10 cc of Gadavist were intravenously injected, 0 cc were   discarded.    The cervical vertebrae are normal in height and signal intensity. There   are small disc osteophyte complexes versus bulges at C5-6 and C6-7. There   is no cord compression or abnormal cord signal. No abnormal abdominal   involving the vertebral bodies with discs is are identified. There are no   epidural collections. After contrast saturation there is normal osseous   and vascular enhancement.    Evaluation of the thoracic spine demonstrates the thoracic vertebral   bodies to be normal in height and signal intensity. No abnormal signal or   enhancement is identified on the cord 2 level. After contrast   administration is normal osseous and vascular enhancement.    IMPRESSION: Small disc osteophyte complexes versus bulges C5-6 and C6-7.   No cord compression. No abnormal signal to suggest infection involving   the cervical and thoracic spine. No abnormal enhancement.        < end of copied text >  < from: MR Lumbar Spine w/wo IV Cont (10.13.22 @ 16:15) >  ACC: [id number] EXAM:  MR SPINE LUMBAR WAW IC                          PROCEDURE DATE:  10/13/2022          INTERPRETATION:  CLINICAL INDICATION: Recent lumbar laminectomy L5-S1   with back pain, prior positive blood cultures, elevated ESR and CRP    Magnetic resonance imaging of the lumbosacral spine was carried out with   sagittal surface coil imaging from T11 to S3 using T1 and fast spin echo   T2 weighted images with and without fat saturation technique with axial   T1 and fast spin echo T2 weighted imaging on a 3.0 Christina magnet. Post   contrast axial and sagittal T1 weighted images were obtained. 10 cc of   Gadavist were intravenously injected, 0 cc were discarded.    Comparison is made with the prior MRI of 9/19/2022.    The visualized thoracic and lumbosacral vertebral bodies are normal in   height and signal intensity with the exception of L5 and S1. There is new   marrow edema with enhancement involving the endplates of L5 and S1 at the   L5-S1 disc spaces which has developed. There is also mild enhancement of   the disc space itself. There is a small ventral epidural collection at   L5-S1 rim enhances suspicious for a small epidural abscess. There is mild   to moderate mass effect on the ventral thecal sac. Mild paraspinal soft   tissue enhancement is identified at the L5-S1 disc space consistent with   periosseous inflammatory change.     There has been a lumbar laminectomy at L5-S1 and there is postoperative   change with a small amount of fluid at the level of the laminectomy. This   could represent a abscess, seroma, or contained leak. Diffusion-weighted   imaging was obtained but does not appear to be contributory.    There is also nonspecific enhancement of the nerve roots in the cauda   equina extending from L1 through L5 which may represent an infectious or   inflammatory process.    The conus conus medullaris is normal in appearance and terminates at the   L1-2 level.      Impression: Findings consistent with osteomyelitis and discitis at L5-S1   with a small ventral epidural abscess. There is no abnormal signal   involving the endplates of L5 and S1 with a small amount of periosteal   and ventral epidural enhancement since 9/19/2022. Small fluid collection   at the level of the laminectomy is nonspecific but may represent and a   sterile or infected collection, seroma or a contained leak. There is also   nonspecific enhancement of the nerve in the cauda equina may represent an   infectious or inflammatory process.      < end of copied text >           Patient is a 37y old  Female who presents with a chief complaint of 10/14/22 S/P lumbar wound revision and washout of epidural collection, exploration of previous L5-S1 discectomy, skin debridement (14 Oct 2022 16:25)    Being followed by ID for Vertebral osteomyelitis    Interval history:  Decreased WBC  Afebrile  No acute events      ROS: Improved LLE weakness and paresthesia  No cough, SOB, CP  No N/V/D./abd pain  No other complaints      Antimicrobials:    cefepime   IVPB 2000 milliGRAM(s) IV Intermittent every 8 hours  vancomycin  IVPB 1500 milliGRAM(s) IV Intermittent every 12 hours      Vital Signs Last 24 Hrs  T(C): 36.8 (10-16-22 @ 05:17), Max: 36.9 (10-15-22 @ 08:13)  T(F): 98.2 (10-16-22 @ 05:17), Max: 98.5 (10-15-22 @ 21:10)  HR: 99 (10-16-22 @ 05:17) (88 - 99)  BP: 122/78 (10-16-22 @ 05:17) (103/67 - 144/88)  BP(mean): --  RR: 18 (10-16-22 @ 05:17) (18 - 18)  SpO2: 95% (10-16-22 @ 05:17) (94% - 96%)    Physical Exam:    Constitutional well preserved, NAD    HEENT EOMI, No pallor or icterus    No oral exudate or erythema    Neck supple no LN    Chest Clear to auscultation    CVS S1 S2 WNl No murmur or rub or gallop    Abd soft BS normal No tenderness no masses, Hemovac drain    Ext No cyanosis clubbing or edema    IV site no erythema tenderness or discharge    Joints no swelling or LOM    CNS AAO X 3 non focal    Lab Data:                          11.0   10.48 )-----------( 376      ( 15 Oct 2022 07:46 )             32.8       10-15    138  |  102  |  11  ----------------------------<  185<H>  3.8   |  24  |  0.61    Ca    9.1      15 Oct 2022 07:42    TPro  7.1  /  Alb  3.9  /  TBili  0.1<L>  /  DBili  x   /  AST  9<L>  /  ALT  15  /  AlkPhos  60  10-15        .Tissue Surgical Swab  10-14-22   No growth  --    No polymorphonuclear leukocytes per low power field  No organisms seen per oil power field      .Surgical Swab Surgical Swab  10-14-22   No growth  --  --      .Surgical Swab Surgical Swab  10-13-22   No growth  --  --      .Tissue Surgical Swab  10-13-22   No growth  --    No polymorphonuclear leukocytes per low power field  No organisms seen per oil power field      .Tissue Surgical Swab  10-13-22   No growth  --    Few polymorphonuclear leukocytes per low power field  No organisms seen per oil power field      .Surgical Swab Surgical Swab  10-13-22   No growth  --  --      .Surgical Swab Surgical Swab  10-13-22   No growth  --  --      .Surgical Swab Surgical Swab  10-13-22   No growth  --  --      .Blood Blood-Peripheral  10-13-22   No growth to date.  --  --      .Blood Blood-Peripheral  10-13-22   No growth to date.  --  --      Vancomycin Level, Trough: 9.5 ug/mL (10-15-22 @ 07:49)    < from: MR Thoracic Spine w/wo IV Cont (10.14.22 @ 23:41) >  ACC: [id number] EXAM:  MR SPINE CERVICAL WAW IC                        ACC: [id number] EXAM:  MR SPINE THORACIC WAW IC                          PROCEDURE DATE:  10/14/2022          INTERPRETATION:  CLINICAL INDICATION: Recent washout of lumbar   laminectomy site, possible osteomyelitis discitis    Magnetic resonance imaging of the cervical and thoracic spine was carried   out with sagittal surface coil imaging from C1 to L1-2 using T1 and fast   spin echo T2 weighted images with and without fat saturation technique   with axial T1 and fast spin echo T2 weighted imaging on a 3.0 Christina   magnet. Post contrast axial and sagittal T1 weighted images were   obtained. 10 cc of Gadavist were intravenously injected, 0 cc were   discarded.    The cervical vertebrae are normal in height and signal intensity. There   are small disc osteophyte complexes versus bulges at C5-6 and C6-7. There   is no cord compression or abnormal cord signal. No abnormal abdominal   involving the vertebral bodies with discs is are identified. There are no   epidural collections. After contrast saturation there is normal osseous   and vascular enhancement.    Evaluation of the thoracic spine demonstrates the thoracic vertebral   bodies to be normal in height and signal intensity. No abnormal signal or   enhancement is identified on the cord 2 level. After contrast   administration is normal osseous and vascular enhancement.    IMPRESSION: Small disc osteophyte complexes versus bulges C5-6 and C6-7.   No cord compression. No abnormal signal to suggest infection involving   the cervical and thoracic spine. No abnormal enhancement.        < end of copied text >  < from: MR Lumbar Spine w/wo IV Cont (10.13.22 @ 16:15) >  ACC: [id number] EXAM:  MR SPINE LUMBAR WAW IC                          PROCEDURE DATE:  10/13/2022          INTERPRETATION:  CLINICAL INDICATION: Recent lumbar laminectomy L5-S1   with back pain, prior positive blood cultures, elevated ESR and CRP    Magnetic resonance imaging of the lumbosacral spine was carried out with   sagittal surface coil imaging from T11 to S3 using T1 and fast spin echo   T2 weighted images with and without fat saturation technique with axial   T1 and fast spin echo T2 weighted imaging on a 3.0 Christina magnet. Post   contrast axial and sagittal T1 weighted images were obtained. 10 cc of   Gadavist were intravenously injected, 0 cc were discarded.    Comparison is made with the prior MRI of 9/19/2022.    The visualized thoracic and lumbosacral vertebral bodies are normal in   height and signal intensity with the exception of L5 and S1. There is new   marrow edema with enhancement involving the endplates of L5 and S1 at the   L5-S1 disc spaces which has developed. There is also mild enhancement of   the disc space itself. There is a small ventral epidural collection at   L5-S1 rim enhances suspicious for a small epidural abscess. There is mild   to moderate mass effect on the ventral thecal sac. Mild paraspinal soft   tissue enhancement is identified at the L5-S1 disc space consistent with   periosseous inflammatory change.     There has been a lumbar laminectomy at L5-S1 and there is postoperative   change with a small amount of fluid at the level of the laminectomy. This   could represent a abscess, seroma, or contained leak. Diffusion-weighted   imaging was obtained but does not appear to be contributory.    There is also nonspecific enhancement of the nerve roots in the cauda   equina extending from L1 through L5 which may represent an infectious or   inflammatory process.    The conus conus medullaris is normal in appearance and terminates at the   L1-2 level.      Impression: Findings consistent with osteomyelitis and discitis at L5-S1   with a small ventral epidural abscess. There is no abnormal signal   involving the endplates of L5 and S1 with a small amount of periosteal   and ventral epidural enhancement since 9/19/2022. Small fluid collection   at the level of the laminectomy is nonspecific but may represent and a   sterile or infected collection, seroma or a contained leak. There is also   nonspecific enhancement of the nerve in the cauda equina may represent an   infectious or inflammatory process.      < end of copied text >

## 2022-10-16 NOTE — PROGRESS NOTE ADULT - SUBJECTIVE AND OBJECTIVE BOX
DATE OF SERVICE: 10-16-22 @ 09:58    Patient is a 37y old  Female who presents with a chief complaint of LLE weakness (16 Oct 2022 07:22)      SUBJECTIVE / OVERNIGHT EVENTS:  no bm yet    MEDICATIONS  (STANDING):  cefepime   IVPB 2000 milliGRAM(s) IV Intermittent every 8 hours  chlorhexidine 2% Cloths 1 Application(s) Topical daily  cyclobenzaprine 10 milliGRAM(s) Oral three times a day  enoxaparin Injectable 40 milliGRAM(s) SubCutaneous <User Schedule>  gabapentin 200 milliGRAM(s) Oral every 8 hours  influenza   Vaccine 0.5 milliLiter(s) IntraMuscular once  lidocaine   4% Patch 1 Patch Transdermal every 24 hours  linaclotide 290 MICROGram(s) Oral before breakfast  mineral oil enema 133 milliLiter(s) Rectal once  polyethylene glycol 3350 17 Gram(s) Oral two times a day  senna 2 Tablet(s) Oral at bedtime  vancomycin  IVPB 1500 milliGRAM(s) IV Intermittent every 12 hours    MEDICATIONS  (PRN):  acetaminophen     Tablet .. 650 milliGRAM(s) Oral every 6 hours PRN Mild Pain (1 - 3)  ALPRAZolam 0.5 milliGRAM(s) Oral every 8 hours PRN anxiety  bisacodyl 5 milliGRAM(s) Oral every 12 hours PRN Constipation  lactulose Syrup 20 Gram(s) Oral daily PRN Constiation  simethicone 80 milliGRAM(s) Chew every 4 hours PRN Upset Stomach  traMADol 50 milliGRAM(s) Oral every 4 hours PRN Moderate Pain (4 - 6)      Vital Signs Last 24 Hrs  T(C): 36.7 (16 Oct 2022 08:04), Max: 36.9 (15 Oct 2022 21:10)  T(F): 98.1 (16 Oct 2022 08:04), Max: 98.5 (15 Oct 2022 21:10)  HR: 83 (16 Oct 2022 08:04) (83 - 99)  BP: 134/90 (16 Oct 2022 08:04) (103/67 - 143/92)  BP(mean): --  RR: 18 (16 Oct 2022 08:04) (18 - 18)  SpO2: 96% (16 Oct 2022 08:04) (94% - 96%)    Parameters below as of 16 Oct 2022 08:04  Patient On (Oxygen Delivery Method): room air      CAPILLARY BLOOD GLUCOSE        I&O's Summary    15 Oct 2022 07:01  -  16 Oct 2022 07:00  --------------------------------------------------------  IN: 720 mL / OUT: 1060 mL / NET: -340 mL    16 Oct 2022 07:01  -  16 Oct 2022 09:58  --------------------------------------------------------  IN: 120 mL / OUT: 0 mL / NET: 120 mL        PHYSICAL EXAM:  GENERAL: NAD, well-developed  HEAD:  Atraumatic, Normocephalic  EYES: EOMI, PERRLA, conjunctiva and sclera clear  NECK: Supple, No JVD  CHEST/LUNG: Clear to auscultation bilaterally; No wheeze  HEART: Regular rate and rhythm; No murmurs, rubs, or gallops  ABDOMEN: Soft, Nontender, Nondistended; Bowel sounds present  EXTREMITIES:  2+ Peripheral Pulses, No clubbing, cyanosis, or edema  PSYCH: AAOx3  NEUROLOGY: non-focal  SKIN: No rashes or lesions    LABS:                        11.0   10.48 )-----------( 376      ( 15 Oct 2022 07:46 )             32.8     10-15    138  |  102  |  11  ----------------------------<  185<H>  3.8   |  24  |  0.61    Ca    9.1      15 Oct 2022 07:42    TPro  7.1  /  Alb  3.9  /  TBili  0.1<L>  /  DBili  x   /  AST  9<L>  /  ALT  15  /  AlkPhos  60  10-15              RADIOLOGY & ADDITIONAL TESTS:    Imaging Personally Reviewed:    Consultant(s) Notes Reviewed:      Care Discussed with Consultants/Other Providers:

## 2022-10-16 NOTE — PROGRESS NOTE ADULT - ASSESSMENT
37F s/p L5 lami L5/S1 (9/13) with re-admission for pseudomonas bacteremia cleared on BCx prior to DC on cipro 750mg BID (ending 10/17).  Seen in office 10/10 for increased pain after BM. Put patient MR loredo Presented to ED for increased pain, midback. Normotensive, tachycardic, afebrile. Wound looks good: clean, dry, intact. Exam: AOx3, BUE 5/5 lowers pain limited, TTP, reports stable genital numbness.      back pain   - epidural abscess  - POD#3 from wound debridment and cultures.  - mild weakness left DF 4/5 with some decreased sensation dorsum improved   - Cx from OR pending NGTD  -  abx as per ID consult   - will need 6 weeks of iv abx  - place picc  - PT to see     recent pseudomonas bacteremia  - abx as per ID  - follow up cultures    constipation  - lactulose has not worked in the past  - cont linzess  - miralax bid  - fleet oil enema x 1    dvt px  - lovenox

## 2022-10-16 NOTE — PROGRESS NOTE ADULT - ASSESSMENT
37F with hx lumbar radiculopathy s/p L5-S1 laminectomy and discectomy on 9/13/22.  Readmitted 9/18/22-9/23/22 for increasing back pain. Found to have pseudomonas bacteremia (carbapenem resistant) MRI with post surgical seroma.  She was treated with IV cefepime and discharged on oral ciprofloxacin which she was to complete 10/17.  Returned again 10/13 as she lost strength to her legs.  Here, MRI noted epidural collection.  She was taken to the OR and underwent washout of epidural collection and lumbar wound revision on 10/13. Drained cloudy fluid, not cornelio pus. Tissue cultures collected.      Lumbar radiculopathy s/p laminectomy and discectomy   Spinal epidural abscess L5/S1  Hx Pseudomonas bacteremia       Epidural abscess  - suspect pseudomonas aeruginosa given recent bacteremia with PA  - On empiric coverage for possible staph/MRSA with vancomycin   - f/u cultures- NGTD, no to few PMNs on tissue samples  - can lower vancomycin 1500mg IV q12 for now,   - monitor vancomycin trough, vanco trough 10/15 in AM only after initial dose, would not adjust dose based on that result.  - may be able to DC vanco if cultures remain negative for staph as no/few PMNs in tissue, MRSA screen negative  - will likely need 6 weeks IV antibiotics    Boston Santos MD  pager [phone]  office [phone]  Please call the ID service [phone] with questions or concerns over the weekend 37F with hx lumbar radiculopathy s/p L5-S1 laminectomy and discectomy on 9/13/22.  Readmitted 9/18/22-9/23/22 for increasing back pain. Found to have pseudomonas bacteremia (carbapenem resistant) MRI with post surgical seroma.  She was treated with IV cefepime and discharged on oral ciprofloxacin which she was to complete 10/17.  Returned again 10/13 as she lost strength to her legs.  Here, MRI noted epidural collection.  She was taken to the OR and underwent washout of epidural collection and lumbar wound revision on 10/13. Drained cloudy fluid, not cornelio pus. Tissue cultures collected.      Lumbar radiculopathy s/p laminectomy and discectomy   Spinal epidural abscess L5/S1  Hx Pseudomonas bacteremia       Epidural abscess  - suspect pseudomonas aeruginosa given recent bacteremia with PA  - On empiric coverage for possible staph/MRSA with vancomycin   - f/u cultures- NGTD, no to few PMNs on tissue samples  - Continue vancomycin 1500mg IV q12 for now,   - monitor vancomycin trough, vanco trough 10/15 in AM only after initial dose, would not adjust dose based on that result.  - may be able to DC vanco if cultures remain negative for staph as no/few PMNs in tissue, MRSA screen negative  - will likely need 6 weeks IV antibiotics, for PICC as BC negative    Boston Santos MD  pager [phone]  office [phone]  Please call the ID service [phone] with questions or concerns over the weekend

## 2022-10-16 NOTE — PROGRESS NOTE ADULT - SUBJECTIVE AND OBJECTIVE BOX
HPI:   37F with PMH/PSH including lumbar radiculopathy s/p L5-S1 laminectomy and discectomy (9/13/22 Dr. Patricio) (ED 9/27/22, admission 9/9/22-9/15/22. re-admission 9/18/22-9/23/22) complicated by pseudomonas bacteremia (carbapenem resistant) MRI with post surgical collection presents to the ED with back pain.  Patient reports that she has been having "pins and needles" to LE since surgery, reports numbness in groin, buttocks, posterior LEs.  Reports she had previously been able to ambulate with cane but reports she had to strain with a BM a few days ago and since then has had increased pain and difficulty ambulating.  Patient reports she has been constipated, taking narcotics for pain control.  Reports was seen recently at Dr. Patricio's office but reports did not have this level of pain at the time of the last visit.  Review of EMR HIE Neurosx note from 10/10/22 reveals patient presented to office with "chief complaint of sharp pain in mid back incision which pulsates after having to strain for bowel movement two days ago."  Mother reports patient was able to ambulate earlier after receiving Fentanyl 100 from EMS.  Mother reports that she has been caring for wound, denies worsening appearance or increased drainage, reports following with plastics (seen initially inpatient 9/23/22, Dr. Carroll) about 1-2 weeks ago and was told to follow up in 4 weeks.  From EMR, MRI L spine w/wo IV contrast (9/19/22) "postsurgical changes emanating from the ventral epidural space occupies approximately 70-75% of the spinal canal diameter. The presence of superinfection (epidural phlegmon/abscess) is not entirely excluded. Enhancing soft tissue within the posterior aspect of the spinal canal at the L5-S1 level likely representing granulation/scar tissue.  Postsurgical seroma at the level of the laminectomy defect measures approximately 1.9 x 3.6 cm. Superinfection is not entirely excluded."  Denies fevers, chills.  Denies loss of urinary or bowel continence. Denies chest pain, shortness of breath, abdominal pain, nausea, vomiting, diarrhea, urinary complaints. (13 Oct 2022 13:07)    OVERNIGHT EVENTS:  Vital Signs Last 24 Hrs  T(C): 36.8 (16 Oct 2022 05:17), Max: 36.9 (15 Oct 2022 08:13)  T(F): 98.2 (16 Oct 2022 05:17), Max: 98.5 (15 Oct 2022 21:10)  HR: 99 (16 Oct 2022 05:17) (88 - 99)  BP: 122/78 (16 Oct 2022 05:17) (103/67 - 144/88)  BP(mean): --  RR: 18 (16 Oct 2022 05:17) (18 - 18)  SpO2: 95% (16 Oct 2022 05:17) (94% - 96%)    Parameters below as of 16 Oct 2022 05:17  Patient On (Oxygen Delivery Method): room air        I&O's Detail    15 Oct 2022 07:01  -  16 Oct 2022 07:00  --------------------------------------------------------  IN:    Oral Fluid: 720 mL  Total IN: 720 mL    OUT:    Drain (mL): 10 mL    Indwelling Catheter - Urethral (mL): 450 mL    Voided (mL): 600 mL  Total OUT: 1060 mL    Total NET: -340 mL        I&O's Summary    15 Oct 2022 07:01  -  16 Oct 2022 07:00  --------------------------------------------------------  IN: 720 mL / OUT: 1060 mL / NET: -340 mL        PHYSICAL EXAM:  Neurological:    AOx3, BUE 5/5 lowers pain limited but L DF/PF 4-/5, TTP, reports stable genital numbness, L foot dec sensation postop    LABS:                        11.0   10.48 )-----------( 376      ( 15 Oct 2022 07:46 )             32.8     10-15    138  |  102  |  11  ----------------------------<  185<H>  3.8   |  24  |  0.61    Ca    9.1      15 Oct 2022 07:42    TPro  7.1  /  Alb  3.9  /  TBili  0.1<L>  /  DBili  x   /  AST  9<L>  /  ALT  15  /  AlkPhos  60  10-15            CAPILLARY BLOOD GLUCOSE          Drug Levels: [] N/A  Vancomycin Level, Trough: 9.5 ug/mL (10-15 @ 07:49)    CSF Analysis: [] N/A      Allergies    No Known Allergies    Intolerances      MEDICATIONS:  Antibiotics:  cefepime   IVPB 2000 milliGRAM(s) IV Intermittent every 8 hours  vancomycin  IVPB 1500 milliGRAM(s) IV Intermittent every 12 hours    Neuro:  acetaminophen     Tablet .. 650 milliGRAM(s) Oral every 6 hours PRN  ALPRAZolam 0.5 milliGRAM(s) Oral every 8 hours PRN  cyclobenzaprine 10 milliGRAM(s) Oral three times a day  gabapentin 200 milliGRAM(s) Oral every 8 hours  traMADol 50 milliGRAM(s) Oral every 4 hours PRN    Anticoagulation:  enoxaparin Injectable 40 milliGRAM(s) SubCutaneous <User Schedule>    OTHER:  bisacodyl 5 milliGRAM(s) Oral every 12 hours PRN  chlorhexidine 2% Cloths 1 Application(s) Topical daily  influenza   Vaccine 0.5 milliLiter(s) IntraMuscular once  lactulose Syrup 20 Gram(s) Oral daily PRN  lidocaine   4% Patch 1 Patch Transdermal every 24 hours  linaclotide 290 MICROGram(s) Oral before breakfast  mineral oil enema 133 milliLiter(s) Rectal once  polyethylene glycol 3350 17 Gram(s) Oral two times a day  senna 2 Tablet(s) Oral at bedtime  simethicone 80 milliGRAM(s) Chew every 4 hours PRN    IVF:    CULTURES:  Culture Results:   No growth (10-14 @ 00:05)  Culture Results:   No growth (10-14 @ 00:02)    RADIOLOGY & ADDITIONAL TESTS:

## 2022-10-16 NOTE — PROGRESS NOTE ADULT - ASSESSMENT
37F with PMH/PSH including lumbar radiculopathy s/p L5-S1 laminectomy and discectomy (9/13/22 Dr. Patricio) (ED 9/27/22, admission 9/9/22-9/15/22. re-admission 9/18/22-9/23/22) complicated by pseudomonas bacteremia (carbapenem resistant) MRI with post surgical collection presents to the ED with back pain.  Patient reports that she has been having "pins and needles" to LE since surgery, reports numbness in groin, buttocks, posterior LEs.  Reports she had previously been able to ambulate with cane but reports she had to strain with a BM a few days ago and since then has had increased pain and difficulty ambulating.  Patient reports she has been constipated, taking narcotics for pain control.  Reports was seen recently at Dr. Patricio's office but reports did not have this level of pain at the time of the last visit.  Review of EMR HIE Neurosx note from 10/10/22 reveals patient presented to office with "chief complaint of sharp pain in mid back incision which pulsates after having to strain for bowel movement two days ago."  Mother reports patient was able to ambulate earlier after receiving Fentanyl 100 from EMS.  Mother reports that she has been caring for wound, denies worsening appearance or increased drainage, reports following with plastics (seen initially inpatient 9/23/22, Dr. Carroll) about 1-2 weeks ago and was told to follow up in 4 weeks.  From EMR, MRI L spine w/wo IV contrast (9/19/22) "postsurgical changes emanating from the ventral epidural space occupies approximately 70-75% of the spinal canal diameter. The presence of superinfection (epidural phlegmon/abscess) is not entirely excluded. Enhancing soft tissue within the posterior aspect of the spinal canal at the L5-S1 level likely representing granulation/scar tissue.  Postsurgical seroma at the level of the laminectomy defect measures approximately 1.9 x 3.6 cm. Superinfection is not entirely excluded."  Denies fevers, chills.  Denies loss of urinary or bowel continence. Denies chest pain, shortness of breath, abdominal pain, nausea, vomiting, diarrhea, urinary complaints. (13 Oct 2022 13:07)    PROCEDURE: Adm 10/13  Wound washout and re-explor prior lami site L5-S1      POD#33/3    PLAN:continue drains, continue antibiotics, continue to follow cultures

## 2022-10-17 LAB — VANCOMYCIN TROUGH SERPL-MCNC: 9.7 UG/ML — LOW (ref 10–20)

## 2022-10-17 PROCEDURE — 71045 X-RAY EXAM CHEST 1 VIEW: CPT | Mod: 26

## 2022-10-17 PROCEDURE — 99233 SBSQ HOSP IP/OBS HIGH 50: CPT

## 2022-10-17 RX ORDER — CHLORHEXIDINE GLUCONATE 213 G/1000ML
1 SOLUTION TOPICAL
Refills: 0 | Status: DISCONTINUED | OUTPATIENT
Start: 2022-10-18 | End: 2022-10-18

## 2022-10-17 RX ORDER — METHOCARBAMOL 500 MG/1
500 TABLET, FILM COATED ORAL THREE TIMES A DAY
Refills: 0 | Status: DISCONTINUED | OUTPATIENT
Start: 2022-10-17 | End: 2022-10-18

## 2022-10-17 RX ORDER — DIAZEPAM 5 MG
5 TABLET ORAL EVERY 8 HOURS
Refills: 0 | Status: DISCONTINUED | OUTPATIENT
Start: 2022-10-17 | End: 2022-10-18

## 2022-10-17 RX ORDER — HYDROMORPHONE HYDROCHLORIDE 2 MG/ML
1 INJECTION INTRAMUSCULAR; INTRAVENOUS; SUBCUTANEOUS
Refills: 0 | Status: DISCONTINUED | OUTPATIENT
Start: 2022-10-17 | End: 2022-10-18

## 2022-10-17 RX ORDER — SODIUM CHLORIDE 9 MG/ML
10 INJECTION INTRAMUSCULAR; INTRAVENOUS; SUBCUTANEOUS
Refills: 0 | Status: DISCONTINUED | OUTPATIENT
Start: 2022-10-17 | End: 2022-10-18

## 2022-10-17 RX ADMIN — CEFEPIME 100 MILLIGRAM(S): 1 INJECTION, POWDER, FOR SOLUTION INTRAMUSCULAR; INTRAVENOUS at 23:01

## 2022-10-17 RX ADMIN — GABAPENTIN 200 MILLIGRAM(S): 400 CAPSULE ORAL at 06:15

## 2022-10-17 RX ADMIN — CEFEPIME 100 MILLIGRAM(S): 1 INJECTION, POWDER, FOR SOLUTION INTRAMUSCULAR; INTRAVENOUS at 14:43

## 2022-10-17 RX ADMIN — GABAPENTIN 200 MILLIGRAM(S): 400 CAPSULE ORAL at 13:42

## 2022-10-17 RX ADMIN — CEFEPIME 100 MILLIGRAM(S): 1 INJECTION, POWDER, FOR SOLUTION INTRAMUSCULAR; INTRAVENOUS at 03:05

## 2022-10-17 RX ADMIN — Medication 0.5 MILLIGRAM(S): at 12:00

## 2022-10-17 RX ADMIN — METHOCARBAMOL 500 MILLIGRAM(S): 500 TABLET, FILM COATED ORAL at 21:17

## 2022-10-17 RX ADMIN — LINACLOTIDE 290 MICROGRAM(S): 145 CAPSULE, GELATIN COATED ORAL at 08:27

## 2022-10-17 RX ADMIN — ENOXAPARIN SODIUM 40 MILLIGRAM(S): 100 INJECTION SUBCUTANEOUS at 17:11

## 2022-10-17 RX ADMIN — CHLORHEXIDINE GLUCONATE 1 APPLICATION(S): 213 SOLUTION TOPICAL at 11:37

## 2022-10-17 RX ADMIN — HYDROMORPHONE HYDROCHLORIDE 1 MILLIGRAM(S): 2 INJECTION INTRAMUSCULAR; INTRAVENOUS; SUBCUTANEOUS at 23:39

## 2022-10-17 RX ADMIN — CYCLOBENZAPRINE HYDROCHLORIDE 10 MILLIGRAM(S): 10 TABLET, FILM COATED ORAL at 22:02

## 2022-10-17 RX ADMIN — TRAMADOL HYDROCHLORIDE 50 MILLIGRAM(S): 50 TABLET ORAL at 11:59

## 2022-10-17 RX ADMIN — SENNA PLUS 2 TABLET(S): 8.6 TABLET ORAL at 21:20

## 2022-10-17 RX ADMIN — CYCLOBENZAPRINE HYDROCHLORIDE 10 MILLIGRAM(S): 10 TABLET, FILM COATED ORAL at 06:15

## 2022-10-17 RX ADMIN — Medication 300 MILLIGRAM(S): at 08:27

## 2022-10-17 RX ADMIN — Medication 5 MILLIGRAM(S): at 17:11

## 2022-10-17 RX ADMIN — GABAPENTIN 200 MILLIGRAM(S): 400 CAPSULE ORAL at 21:20

## 2022-10-17 RX ADMIN — TRAMADOL HYDROCHLORIDE 50 MILLIGRAM(S): 50 TABLET ORAL at 12:59

## 2022-10-17 RX ADMIN — SIMETHICONE 80 MILLIGRAM(S): 80 TABLET, CHEWABLE ORAL at 20:48

## 2022-10-17 RX ADMIN — CYCLOBENZAPRINE HYDROCHLORIDE 10 MILLIGRAM(S): 10 TABLET, FILM COATED ORAL at 13:43

## 2022-10-17 RX ADMIN — Medication 300 MILLIGRAM(S): at 21:18

## 2022-10-17 NOTE — PROGRESS NOTE ADULT - SUBJECTIVE AND OBJECTIVE BOX
Patient is a 37y old  Female who presents with a chief complaint of lumbar discitis/osteo (14 Oct 2022 08:55)    f/u epidural infection    Interval History/ROS:  no fever.  back better. OR cultures to date are negative.  awaiting PICC.  Remainder of ROS otherwise negative.    PAST MEDICAL & SURGICAL HISTORY:  Lumbar herniated disc  S/P laminectomy L5-S1 on 9/13/2022    Allergies  No Known Allergies    ANTIMICROBIALS:  cefepime   IVPB 2000 every 8 hoursonce (10/13-)  vancomycin  IVPB 1500 every 12 hours (10/14-)    MEDICATIONS  (STANDING):  cyclobenzaprine 10 three times a day  enoxaparin Injectable 40 <User Schedule>  gabapentin 200 every 8 hours  linaclotide 290 before breakfast  mineral oil enema 133 once  polyethylene glycol 3350 17 two times a day  senna 2 at bedtime    Vital Signs Last 24 Hrs  T(F): 98 (10-17-22 @ 08:40), Max: 98.9 (10-16-22 @ 15:48)  HR: 96 (10-17-22 @ 08:40)  BP: 122/85 (10-17-22 @ 08:40)  RR: 18 (10-17-22 @ 08:40)  SpO2: 98% (10-17-22 @ 08:40) (93% - 98%)    PHYSICAL EXAM:  Constitutional: non-toxic  HEAD/EYES: anicteric  ENT:  supple  Cardiovascular:   normal S1, S2  Respiratory:  clear BS bilaterally  GI:  soft, non-tender, normal bowel sounds  :  no walker  Musculoskeletal:  no synovitis, normal ROM  Neurologic: awake and alert, normal strength, no focal findings  Skin:  surgical site healing  Psychiatric:  awake, alert, appropriate mood    Sedimentation Rate, Erythrocyte: 59 (10-13 @ 10:35)  Sedimentation Rate, Erythrocyte: 72 (09-19 @ 15:39)    Procalcitonin, Serum: 0.04 (10-13 @ 10:34)  Procalcitonin, Serum: 0.10 (09-19 @ 16:48)    C-Reactive Protein, Serum: 33 (10-13 @ 10:34)    MICROBIOLOGY:  Culture - Tissue with Gram Stain (collected 10-14-22 @ 00:05)  Source: .Tissue Surgical Swab  Gram Stain (10-14-22 @ 23:06):    No polymorphonuclear leukocytes per low power field    No organisms seen per oil power field  Preliminary Report (10-15-22 @ 14:42):    No growth    Culture - Fungal, Tissue (collected 10-14-22 @ 00:05)  Source: .Tissue Other  Preliminary Report (10-17-22 @ 11:10):    Testing in progress    Culture - Surgical Swab (collected 10-14-22 @ 00:02)  Source: .Surgical Swab Surgical Swab  Preliminary Report (10-15-22 @ 08:25):    No growth    Culture - Fungal, Other (collected 10-14-22 @ 00:02)  Source: .Other Other  Preliminary Report (10-17-22 @ 11:02):    Testing in progress    Culture - Surgical Swab (collected 10-13-22 @ 23:59)  Source: .Surgical Swab Surgical Swab  Preliminary Report (10-15-22 @ 09:01):    No growth    Culture - Fungal, Other (collected 10-13-22 @ 23:58)  Source: .Other Other  Preliminary Report (10-17-22 @ 11:02):    Testing in progress    Culture - Fungal, Tissue (collected 10-13-22 @ 23:57)  Source: .Tissue Other  Preliminary Report (10-17-22 @ 11:02):    Testing in progress    Culture - Tissue with Gram Stain (collected 10-13-22 @ 23:57)  Source: .Tissue Surgical Swab  Gram Stain (10-14-22 @ 23:07):    No polymorphonuclear leukocytes per low power field    No organisms seen per oil power field  Preliminary Report (10-15-22 @ 14:44):    No growth    Culture - Fungal, Tissue (collected 10-13-22 @ 23:55)  Source: .Tissue Other  Preliminary Report (10-17-22 @ 11:10):    Testing in progress    Culture - Tissue with Gram Stain (collected 10-13-22 @ 23:55)  Source: .Tissue Surgical Swab  Gram Stain (10-14-22 @ 23:07):    Few polymorphonuclear leukocytes per low power field    No organisms seen per oil power field  Preliminary Report (10-15-22 @ 14:43):    No growth    Culture - Fungal, Other (collected 10-13-22 @ 23:54)  Source: .Other Other  Preliminary Report (10-17-22 @ 11:14):    Testing in progress    Culture - Surgical Swab (collected 10-13-22 @ 23:53)  Source: .Surgical Swab Surgical Swab  Preliminary Report (10-15-22 @ 09:02):    No growth    Culture - Surgical Swab (collected 10-13-22 @ 23:52)  Source: .Surgical Swab Surgical Swab  Preliminary Report (10-15-22 @ 08:26):    No growth    Culture - Fungal, Other (collected 10-13-22 @ 23:52)  Source: .Other Other  Preliminary Report (10-17-22 @ 11:02):    Testing in progress    Culture - Fungal, Other (collected 10-13-22 @ 23:43)  Source: .Other Other  Preliminary Report (10-17-22 @ 11:14):    Testing in progress    Culture - Surgical Swab (collected 10-13-22 @ 23:41)  Source: .Surgical Swab Surgical Swab  Preliminary Report (10-15-22 @ 09:00):    No growth    Culture - Blood (collected 10-13-22 @ 10:10)  Source: .Blood Blood-Peripheral  Preliminary Report (10-14-22 @ 16:02):    No growth to date.    Culture - Blood (collected 10-13-22 @ 09:55)  Source: .Blood Blood-Peripheral  Preliminary Report (10-14-22 @ 16:02):    No growth to date.    Culture - Blood (collected 21 Sep 2022 07:59)  Source: .Blood Blood-Venous  Final Report:    No Growth Final    Culture - Blood (collected 21 Sep 2022 07:59)  Source: .Blood Blood  Final Report:    No Growth Final    Culture - Blood (collected 18 Sep 2022 22:28)  Source: .Blood Blood-Peripheral  Final Report:    Growth in aerobic bottle: Pseudomonas aeruginosa (Carbapenem Resistant)    ***Blood Panel PCR results on this specimen are available    approximately 3 hours after the Gram stain result.***    Gram stain, PCR, and/or culture results may not always    correspond due to difference in methodologies.    ************************************************************    This PCR assay was performed by multiplex PCR. This    Assay tests for 66 bacterial and resistance gene targets.    Please refer to the [facility] test directory    at [email or web address] for details.  Organism: Blood Culture PCR  Pseudomonas aeruginosa (Carbapenem Resistant)  Organism: Pseudomonas aeruginosa (Carbapenem Resistant)    Sensitivities:      -  Amikacin: S <=16      -  Aztreonam: S <=4      -  Cefepime: S <=2      -  Ceftazidime: S <=1      -  Ceftazidime/Avibactam: S <=4      -  Ceftolozane/tazobactam: S <=2      -  Ciprofloxacin: S <=0.25      -  Gentamicin: S <=2      -  Imipenem: R >8      -  Levofloxacin: S <=0.5      -  Meropenem: I 4      -  Piperacillin/Tazobactam: S <=8      -  Tobramycin: S <=2      Method Type: NADEEM  Organism: Blood Culture PCR    Sensitivities:      -  Pseudomonas aeruginosa: Detec      Method Type: PCR    Culture - Blood (collected 18 Sep 2022 22:17)  Source: .Blood Blood-Peripheral  Final Report:    No Growth Final    HIV-1/2 Combo Result: Nonreact (09-07-22 @ 12:22)    COVID-19 PCR: NotDetec (10-13-22 @ 10:35)  COVID-19 PCR: NotDetec (09-18-22 @ 05:24)  COVID-19 PCR: NotDetec (09-09-22 @ 12:54)    RADIOLOGY:  imaging below personally reviewed    MR Lumbar Spine w/wo IV Cont (10.13.22 @ 16:15) >  Impression: Findings consistent with osteomyelitis and discitis at L5-S1 with a small ventral epidural abscess. There is no abnormal signal involving the endplates of L5 and S1 with a small amount of periosteal   and ventral epidural enhancement since 9/19/2022. Small fluid collection at the level of the laminectomy is nonspecific but may represent and a sterile or infected collection, seroma or a contained leak. There is also   nonspecific enhancement of the nerve in the cauda equina may represent an infectious or inflammatory process.    VA Duplex Lower Ext Vein Scan, Bilat (09.19.22 @ 11:19) >  IMPRESSION:  No evidence of deep venous thrombosis in either lower extremity.    CT Lumbar Spine No Cont (09.18.22 @ 05:41) >  IMPRESSION:  Status post L5 laminectomy with postsurgical changes as described above.    MR Lumbar Spine No Cont (09.11.22 @ 13:12) >  IMPRESSION: Large central and left-sided disc herniation L5-S1 with significant thecal sac compression and extension down behind the S1 vertebral body with a probable sequestered fragment.    MR Lumbar Spine No Cont (09.09.22 @ 21:32) >  IMPRESSION:  Limited exam due to lack of axial imaging. Degenerative changes at L5-S1 with a suspected a large central disc herniation. Axial images through the L5-S1 level can be obtained to complete evaluation.

## 2022-10-17 NOTE — PROGRESS NOTE ADULT - ASSESSMENT
37F with hx lumbar radiculopathy s/p L5-S1 laminectomy and discectomy on 9/13/22.  Readmitted 9/18/22-9/23/22 for increasing back pain. Found to have pseudomonas bacteremia (carbapenem resistant) MRI with post surgical seroma.  She was treated with IV cefepime and discharged on oral ciprofloxacin which she was to complete 10/17.  Returned again 10/13 as she lost strength to her legs.  Here, MRI noted epidural collection.  She was taken to the OR and underwent washout of epidural collection and lumbar wound revision. Drained cloudy fluid, not cornelio pus. Tissue cultures collected.    Epidural abscess  - suspect pseudomonas aeruginosa given recent bacteremia with PA  - will cover for possible staph/MRSA with vancomycin   - f/u cultures  - continue vancomycin and cefepime  - likely 6-8 weeks pending OR culture results  - monitor vancomycin trough  - for PICC today    I have discussed plan of care as detailed above with neurosurgery PA

## 2022-10-17 NOTE — PROGRESS NOTE ADULT - ASSESSMENT
37F s/p L5 lami L5/S1 (9/13) with re-admission for pseudomonas bacteremia cleared on BCx prior to DC on cipro 750mg BID (ending 10/17).  Seen in office 10/10 for increased pain after BM. Put patient MR villarreal. Presented to ED for increased pain, midback. Normotensive, tachycardic, afebrile. Wound looks good: clean, dry, intact. Exam: AOx3, BUE 5/5 lowers pain limited, TTP, reports stable genital numbness.      back pain   - epidural abscess  - POD#3 from wound debridment and cultures.  - mild weakness left DF 4/5 with some decreased sensation dorsum improved   - Cx from OR pending NGTD  -  abx as per ID consult   - will need 6 weeks of iv abx  - place picc  - PT to see     recent pseudomonas bacteremia  - abx as per ID  - follow up cultures    constipation  - lactulose has not worked in the past  - cont linzess  - miralax bid  - enema or suppository prn    dvt px  - lovenox

## 2022-10-17 NOTE — PROGRESS NOTE ADULT - SUBJECTIVE AND OBJECTIVE BOX
CHIEF COMPLAINT: s/p PICC at bedside today; reports left foot numbness persists and is frustrated, reports needs additional muscle relaxer as flexeril not working as well as before  awaiting final ID antibiotic plan for discharge- anticipate 6-8 weeks of IV abx per discussion at bedside    Vital Signs Last 24 Hrs  T(C): 36.4 (17 Oct 2022 12:38), Max: 36.9 (16 Oct 2022 20:53)  T(F): 97.6 (17 Oct 2022 12:38), Max: 98.5 (16 Oct 2022 20:53)  HR: 102 (17 Oct 2022 12:38) (77 - 102)  BP: 131/79 (17 Oct 2022 12:38) (122/85 - 137/90)  BP(mean): --  RR: 18 (17 Oct 2022 12:38) (18 - 18)  SpO2: 96% (17 Oct 2022 12:38) (93% - 98%)    Parameters below as of 17 Oct 2022 12:38  Patient On (Oxygen Delivery Method): room air    PHYSICAL EXAM:    General: No Acute Distress     Neurological: Awake, alert oriented to person, place and time, Following Commands, PERRL, EOMI, Face Symmetrical, Speech Fluent, Moving all extremities, full strenght except Left DF/PF 4+/5 and decreased sensation left LE throughout but decreased more distally otherwise R LE intact sensation   Pulmonary: Clear to Auscultation, No Rales, No Rhonchi, No Wheezes     Cardiovascular: S1, S2, Regular Rate and Rhythm     Gastrointestinal: Soft, Nontender, Nondistended     Incision: +sutures c/d/i    LABS: no new labs                  MEDICATIONS  (STANDING):  cefepime   IVPB 2000 milliGRAM(s) IV Intermittent every 8 hours  cyclobenzaprine 10 milliGRAM(s) Oral three times a day  enoxaparin Injectable 40 milliGRAM(s) SubCutaneous <User Schedule>  gabapentin 200 milliGRAM(s) Oral every 8 hours  influenza   Vaccine 0.5 milliLiter(s) IntraMuscular once  lidocaine   4% Patch 1 Patch Transdermal every 24 hours  linaclotide 290 MICROGram(s) Oral before breakfast  mineral oil enema 133 milliLiter(s) Rectal once  polyethylene glycol 3350 17 Gram(s) Oral two times a day  senna 2 Tablet(s) Oral at bedtime  vancomycin  IVPB 1500 milliGRAM(s) IV Intermittent every 12 hours    MEDICATIONS  (PRN):  acetaminophen     Tablet .. 650 milliGRAM(s) Oral every 6 hours PRN Mild Pain (1 - 3)  bisacodyl 5 milliGRAM(s) Oral every 12 hours PRN Constipation  diazepam    Tablet 5 milliGRAM(s) Oral every 8 hours PRN spasms  lactulose Syrup 20 Gram(s) Oral daily PRN Constiation  simethicone 80 milliGRAM(s) Chew every 4 hours PRN Upset Stomach  sodium chloride 0.9% lock flush 10 milliLiter(s) IV Push every 1 hour PRN Pre/post blood products, medications, blood draw, and to maintain line patency  traMADol 50 milliGRAM(s) Oral every 4 hours PRN Moderate Pain (4 - 6)    DIET: [x] Regular [] CCD [] Renal [] Puree [] Dysphagia [] Tube Feeds:

## 2022-10-17 NOTE — PROGRESS NOTE ADULT - SUBJECTIVE AND OBJECTIVE BOX
DATE OF SERVICE: 10-17-22 @ 13:27    Patient is a 37y old  Female who presents with a chief complaint of LLE weakness (16 Oct 2022 07:22)      SUBJECTIVE / OVERNIGHT EVENTS:  sitting in chair, less pain, had BM    MEDICATIONS  (STANDING):  cefepime   IVPB 2000 milliGRAM(s) IV Intermittent every 8 hours  chlorhexidine 2% Cloths 1 Application(s) Topical daily  cyclobenzaprine 10 milliGRAM(s) Oral three times a day  enoxaparin Injectable 40 milliGRAM(s) SubCutaneous <User Schedule>  gabapentin 200 milliGRAM(s) Oral every 8 hours  influenza   Vaccine 0.5 milliLiter(s) IntraMuscular once  lidocaine   4% Patch 1 Patch Transdermal every 24 hours  linaclotide 290 MICROGram(s) Oral before breakfast  mineral oil enema 133 milliLiter(s) Rectal once  polyethylene glycol 3350 17 Gram(s) Oral two times a day  senna 2 Tablet(s) Oral at bedtime  vancomycin  IVPB 1500 milliGRAM(s) IV Intermittent every 12 hours    MEDICATIONS  (PRN):  acetaminophen     Tablet .. 650 milliGRAM(s) Oral every 6 hours PRN Mild Pain (1 - 3)  ALPRAZolam 0.5 milliGRAM(s) Oral every 8 hours PRN anxiety  bisacodyl 5 milliGRAM(s) Oral every 12 hours PRN Constipation  lactulose Syrup 20 Gram(s) Oral daily PRN Constiation  simethicone 80 milliGRAM(s) Chew every 4 hours PRN Upset Stomach  traMADol 50 milliGRAM(s) Oral every 4 hours PRN Moderate Pain (4 - 6)      Vital Signs Last 24 Hrs  T(C): 36.4 (17 Oct 2022 12:38), Max: 37.2 (16 Oct 2022 15:48)  T(F): 97.6 (17 Oct 2022 12:38), Max: 98.9 (16 Oct 2022 15:48)  HR: 102 (17 Oct 2022 12:38) (77 - 110)  BP: 131/79 (17 Oct 2022 12:38) (122/85 - 144/94)  BP(mean): --  RR: 18 (17 Oct 2022 12:38) (18 - 18)  SpO2: 96% (17 Oct 2022 12:38) (93% - 98%)    Parameters below as of 17 Oct 2022 12:38  Patient On (Oxygen Delivery Method): room air      CAPILLARY BLOOD GLUCOSE        I&O's Summary    16 Oct 2022 07:01  -  17 Oct 2022 07:00  --------------------------------------------------------  IN: 1010 mL / OUT: 200 mL / NET: 810 mL    17 Oct 2022 07:01  -  17 Oct 2022 13:27  --------------------------------------------------------  IN: 440 mL / OUT: 0 mL / NET: 440 mL        PHYSICAL EXAM:  GENERAL: NAD, well-developed  HEAD:  Atraumatic, Normocephalic  EYES: EOMI, PERRLA, conjunctiva and sclera clear  NECK: Supple, No JVD  CHEST/LUNG: Clear to auscultation bilaterally; No wheeze  HEART: Regular rate and rhythm; No murmurs, rubs, or gallops  ABDOMEN: Soft, Nontender, Nondistended; Bowel sounds present  EXTREMITIES:  2+ Peripheral Pulses, No clubbing, cyanosis, or edema  PSYCH: AAOx3  NEUROLOGY: non-focal  SKIN: No rashes or lesions    LABS:                    RADIOLOGY & ADDITIONAL TESTS:    Imaging Personally Reviewed:    Consultant(s) Notes Reviewed:      Care Discussed with Consultants/Other Providers:

## 2022-10-17 NOTE — PROGRESS NOTE ADULT - ASSESSMENT
HPI:   37F with PMH/PSH including lumbar radiculopathy s/p L5-S1 laminectomy and discectomy (9/13/22 Dr. Patricio) (ED 9/27/22, admission 9/9/22-9/15/22. re-admission 9/18/22-9/23/22) complicated by pseudomonas bacteremia (carbapenem resistant) MRI with post surgical collection presents to the ED with back pain.  Patient reports that she has been having "pins and needles" to LE since surgery, reports numbness in groin, buttocks, posterior LEs.  Reports she had previously been able to ambulate with cane but reports she had to strain with a BM a few days ago and since then has had increased pain and difficulty ambulating.  Patient reports she has been constipated, taking narcotics for pain control.  Reports was seen recently at Dr. Patricio's office but reports did not have this level of pain at the time of the last visit.  Review of EMR HIE Neurosx note from 10/10/22 reveals patient presented to office with "chief complaint of sharp pain in mid back incision which pulsates after having to strain for bowel movement two days ago."  Mother reports patient was able to ambulate earlier after receiving Fentanyl 100 from EMS.  Mother reports that she has been caring for wound, denies worsening appearance or increased drainage, reports following with plastics (seen initially inpatient 9/23/22, Dr. Carroll) about 1-2 weeks ago and was told to follow up in 4 weeks.  From EMR, MRI L spine w/wo IV contrast (9/19/22) "postsurgical changes emanating from the ventral epidural space occupies approximately 70-75% of the spinal canal diameter. The presence of superinfection (epidural phlegmon/abscess) is not entirely excluded. Enhancing soft tissue within the posterior aspect of the spinal canal at the L5-S1 level likely representing granulation/scar tissue.  Postsurgical seroma at the level of the laminectomy defect measures approximately 1.9 x 3.6 cm. Superinfection is not entirely excluded."  Denies fevers, chills.  Denies loss of urinary or bowel continence. Denies chest pain, shortness of breath, abdominal pain, nausea, vomiting, diarrhea, urinary complaints. (13 Oct 2022 13:07)    PAST MEDICAL & SURGICAL HISTORY:  Lumbar herniated disc      S/P laminectomy  L5-S1 on 9/13/2022    PROCEDURE:  10/14/22 S/P lumbar wound revision and washout of epidural collection, exploration of previous L5-S1 discectomy, skin debridement  OR cultures neg to date.     PLAN:  Neuro: tramadol PRN pain, cont neurontin given post op left radiculopathy; flexeril ATC and valium PRN added  Respiratory: patient instructed on incentive spirometer  CV: normotensive no meds  Heme/Onc: f/u am cbc, stable           DVT ppx: [] SQH [x] SQL and venodynes bilaterally  Renal: IVL and voiding well  ID: OR cultures negative to date however was previously on abx,   awaiting final ID antibiotic plan for discharge- anticipate 6-8 weeks of IV abx per discussion at bedside; appreciate ID consult  GI: bowel regimen, known chronic constipation, home linzess started   PT/OT: home OT, outpt PT upon d/c  sister at bedside and updated  f/u am labs    will discuss with Dr Patricio   [facility] # [id number]

## 2022-10-18 ENCOUNTER — TRANSCRIPTION ENCOUNTER (OUTPATIENT)
Age: 37
End: 2022-10-18

## 2022-10-18 ENCOUNTER — NON-APPOINTMENT (OUTPATIENT)
Age: 37
End: 2022-10-18

## 2022-10-18 VITALS
TEMPERATURE: 98 F | DIASTOLIC BLOOD PRESSURE: 75 MMHG | OXYGEN SATURATION: 95 % | HEART RATE: 108 BPM | SYSTOLIC BLOOD PRESSURE: 115 MMHG | RESPIRATION RATE: 18 BRPM

## 2022-10-18 LAB
ALBUMIN SERPL ELPH-MCNC: 3.8 G/DL — SIGNIFICANT CHANGE UP (ref 3.3–5)
ALP SERPL-CCNC: 59 U/L — SIGNIFICANT CHANGE UP (ref 40–120)
ALT FLD-CCNC: 14 U/L — SIGNIFICANT CHANGE UP (ref 10–45)
ANION GAP SERPL CALC-SCNC: 12 MMOL/L — SIGNIFICANT CHANGE UP (ref 5–17)
AST SERPL-CCNC: 9 U/L — LOW (ref 10–40)
BASOPHILS # BLD AUTO: 0.07 K/UL — SIGNIFICANT CHANGE UP (ref 0–0.2)
BASOPHILS NFR BLD AUTO: 0.6 % — SIGNIFICANT CHANGE UP (ref 0–2)
BILIRUB SERPL-MCNC: 0.3 MG/DL — SIGNIFICANT CHANGE UP (ref 0.2–1.2)
BUN SERPL-MCNC: 10 MG/DL — SIGNIFICANT CHANGE UP (ref 7–23)
CALCIUM SERPL-MCNC: 9 MG/DL — SIGNIFICANT CHANGE UP (ref 8.4–10.5)
CHLORIDE SERPL-SCNC: 101 MMOL/L — SIGNIFICANT CHANGE UP (ref 96–108)
CO2 SERPL-SCNC: 25 MMOL/L — SIGNIFICANT CHANGE UP (ref 22–31)
CREAT SERPL-MCNC: 0.64 MG/DL — SIGNIFICANT CHANGE UP (ref 0.5–1.3)
CULTURE RESULTS: SIGNIFICANT CHANGE UP
CULTURE RESULTS: SIGNIFICANT CHANGE UP
EGFR: 118 ML/MIN/1.73M2 — SIGNIFICANT CHANGE UP
EOSINOPHIL # BLD AUTO: 0.1 K/UL — SIGNIFICANT CHANGE UP (ref 0–0.5)
EOSINOPHIL NFR BLD AUTO: 0.9 % — SIGNIFICANT CHANGE UP (ref 0–6)
GLUCOSE SERPL-MCNC: 111 MG/DL — HIGH (ref 70–99)
HCT VFR BLD CALC: 32.8 % — LOW (ref 34.5–45)
HGB BLD-MCNC: 11 G/DL — LOW (ref 11.5–15.5)
IMM GRANULOCYTES NFR BLD AUTO: 1.8 % — HIGH (ref 0–0.9)
LYMPHOCYTES # BLD AUTO: 3.58 K/UL — HIGH (ref 1–3.3)
LYMPHOCYTES # BLD AUTO: 32.4 % — SIGNIFICANT CHANGE UP (ref 13–44)
MCHC RBC-ENTMCNC: 28.8 PG — SIGNIFICANT CHANGE UP (ref 27–34)
MCHC RBC-ENTMCNC: 33.5 GM/DL — SIGNIFICANT CHANGE UP (ref 32–36)
MCV RBC AUTO: 85.9 FL — SIGNIFICANT CHANGE UP (ref 80–100)
MONOCYTES # BLD AUTO: 0.86 K/UL — SIGNIFICANT CHANGE UP (ref 0–0.9)
MONOCYTES NFR BLD AUTO: 7.8 % — SIGNIFICANT CHANGE UP (ref 2–14)
NEUTROPHILS # BLD AUTO: 6.24 K/UL — SIGNIFICANT CHANGE UP (ref 1.8–7.4)
NEUTROPHILS NFR BLD AUTO: 56.5 % — SIGNIFICANT CHANGE UP (ref 43–77)
NRBC # BLD: 0 /100 WBCS — SIGNIFICANT CHANGE UP (ref 0–0)
PLATELET # BLD AUTO: 343 K/UL — SIGNIFICANT CHANGE UP (ref 150–400)
POTASSIUM SERPL-MCNC: 3.7 MMOL/L — SIGNIFICANT CHANGE UP (ref 3.5–5.3)
POTASSIUM SERPL-SCNC: 3.7 MMOL/L — SIGNIFICANT CHANGE UP (ref 3.5–5.3)
PROT SERPL-MCNC: 6.6 G/DL — SIGNIFICANT CHANGE UP (ref 6–8.3)
RBC # BLD: 3.82 M/UL — SIGNIFICANT CHANGE UP (ref 3.8–5.2)
RBC # FLD: 12.2 % — SIGNIFICANT CHANGE UP (ref 10.3–14.5)
SODIUM SERPL-SCNC: 138 MMOL/L — SIGNIFICANT CHANGE UP (ref 135–145)
SPECIMEN SOURCE: SIGNIFICANT CHANGE UP
SPECIMEN SOURCE: SIGNIFICANT CHANGE UP
VANCOMYCIN TROUGH SERPL-MCNC: 14.2 UG/ML — SIGNIFICANT CHANGE UP (ref 10–20)
WBC # BLD: 11.05 K/UL — HIGH (ref 3.8–10.5)
WBC # FLD AUTO: 11.05 K/UL — HIGH (ref 3.8–10.5)

## 2022-10-18 PROCEDURE — 85018 HEMOGLOBIN: CPT

## 2022-10-18 PROCEDURE — 96376 TX/PRO/DX INJ SAME DRUG ADON: CPT

## 2022-10-18 PROCEDURE — 87102 FUNGUS ISOLATION CULTURE: CPT

## 2022-10-18 PROCEDURE — 72156 MRI NECK SPINE W/O & W/DYE: CPT

## 2022-10-18 PROCEDURE — 97116 GAIT TRAINING THERAPY: CPT

## 2022-10-18 PROCEDURE — 80202 ASSAY OF VANCOMYCIN: CPT

## 2022-10-18 PROCEDURE — 97530 THERAPEUTIC ACTIVITIES: CPT

## 2022-10-18 PROCEDURE — 71045 X-RAY EXAM CHEST 1 VIEW: CPT

## 2022-10-18 PROCEDURE — 87186 SC STD MICRODIL/AGAR DIL: CPT

## 2022-10-18 PROCEDURE — 72157 MRI CHEST SPINE W/O & W/DYE: CPT

## 2022-10-18 PROCEDURE — 97535 SELF CARE MNGMENT TRAINING: CPT

## 2022-10-18 PROCEDURE — 87070 CULTURE OTHR SPECIMN AEROBIC: CPT

## 2022-10-18 PROCEDURE — 87640 STAPH A DNA AMP PROBE: CPT

## 2022-10-18 PROCEDURE — 85014 HEMATOCRIT: CPT

## 2022-10-18 PROCEDURE — 86901 BLOOD TYPING SEROLOGIC RH(D): CPT

## 2022-10-18 PROCEDURE — 85652 RBC SED RATE AUTOMATED: CPT

## 2022-10-18 PROCEDURE — 86140 C-REACTIVE PROTEIN: CPT

## 2022-10-18 PROCEDURE — A9585: CPT

## 2022-10-18 PROCEDURE — 97166 OT EVAL MOD COMPLEX 45 MIN: CPT

## 2022-10-18 PROCEDURE — 82947 ASSAY GLUCOSE BLOOD QUANT: CPT

## 2022-10-18 PROCEDURE — 96375 TX/PRO/DX INJ NEW DRUG ADDON: CPT

## 2022-10-18 PROCEDURE — U0005: CPT

## 2022-10-18 PROCEDURE — 36569 INSJ PICC 5 YR+ W/O IMAGING: CPT

## 2022-10-18 PROCEDURE — 85730 THROMBOPLASTIN TIME PARTIAL: CPT

## 2022-10-18 PROCEDURE — 86850 RBC ANTIBODY SCREEN: CPT

## 2022-10-18 PROCEDURE — 84132 ASSAY OF SERUM POTASSIUM: CPT

## 2022-10-18 PROCEDURE — 85027 COMPLETE CBC AUTOMATED: CPT

## 2022-10-18 PROCEDURE — 96374 THER/PROPH/DIAG INJ IV PUSH: CPT

## 2022-10-18 PROCEDURE — 86900 BLOOD TYPING SEROLOGIC ABO: CPT

## 2022-10-18 PROCEDURE — 87077 CULTURE AEROBIC IDENTIFY: CPT

## 2022-10-18 PROCEDURE — 84145 PROCALCITONIN (PCT): CPT

## 2022-10-18 PROCEDURE — 36415 COLL VENOUS BLD VENIPUNCTURE: CPT

## 2022-10-18 PROCEDURE — 99285 EMERGENCY DEPT VISIT HI MDM: CPT | Mod: 25

## 2022-10-18 PROCEDURE — 80053 COMPREHEN METABOLIC PANEL: CPT

## 2022-10-18 PROCEDURE — 87641 MR-STAPH DNA AMP PROBE: CPT

## 2022-10-18 PROCEDURE — 85610 PROTHROMBIN TIME: CPT

## 2022-10-18 PROCEDURE — 85025 COMPLETE CBC W/AUTO DIFF WBC: CPT

## 2022-10-18 PROCEDURE — 82330 ASSAY OF CALCIUM: CPT

## 2022-10-18 PROCEDURE — 87040 BLOOD CULTURE FOR BACTERIA: CPT

## 2022-10-18 PROCEDURE — 99233 SBSQ HOSP IP/OBS HIGH 50: CPT

## 2022-10-18 PROCEDURE — 72158 MRI LUMBAR SPINE W/O & W/DYE: CPT | Mod: MA

## 2022-10-18 PROCEDURE — 97162 PT EVAL MOD COMPLEX 30 MIN: CPT

## 2022-10-18 PROCEDURE — 83605 ASSAY OF LACTIC ACID: CPT

## 2022-10-18 PROCEDURE — 87075 CULTR BACTERIA EXCEPT BLOOD: CPT

## 2022-10-18 PROCEDURE — 80048 BASIC METABOLIC PNL TOTAL CA: CPT

## 2022-10-18 PROCEDURE — 84295 ASSAY OF SERUM SODIUM: CPT

## 2022-10-18 PROCEDURE — C1751: CPT

## 2022-10-18 PROCEDURE — 82435 ASSAY OF BLOOD CHLORIDE: CPT

## 2022-10-18 PROCEDURE — 82803 BLOOD GASES ANY COMBINATION: CPT

## 2022-10-18 PROCEDURE — U0003: CPT

## 2022-10-18 RX ORDER — SENNA PLUS 8.6 MG/1
2 TABLET ORAL
Qty: 0 | Refills: 0 | DISCHARGE
Start: 2022-10-18

## 2022-10-18 RX ORDER — OXYCODONE HYDROCHLORIDE 5 MG/1
1 TABLET ORAL
Qty: 20 | Refills: 0

## 2022-10-18 RX ORDER — OXYCODONE HYDROCHLORIDE 5 MG/1
15 TABLET ORAL ONCE
Refills: 0 | Status: DISCONTINUED | OUTPATIENT
Start: 2022-10-18 | End: 2022-10-18

## 2022-10-18 RX ORDER — OXYCODONE HYDROCHLORIDE 5 MG/1
15 TABLET ORAL EVERY 4 HOURS
Refills: 0 | Status: DISCONTINUED | OUTPATIENT
Start: 2022-10-18 | End: 2022-10-18

## 2022-10-18 RX ORDER — ACETAMINOPHEN 500 MG
2 TABLET ORAL
Qty: 0 | Refills: 0 | DISCHARGE
Start: 2022-10-18

## 2022-10-18 RX ORDER — OXYCODONE HYDROCHLORIDE 5 MG/1
1 TABLET ORAL
Qty: 28 | Refills: 0
Start: 2022-10-18

## 2022-10-18 RX ORDER — POLYETHYLENE GLYCOL 3350 17 G/17G
17 POWDER, FOR SOLUTION ORAL
Qty: 0 | Refills: 0 | DISCHARGE

## 2022-10-18 RX ORDER — PANTOPRAZOLE SODIUM 20 MG/1
1 TABLET, DELAYED RELEASE ORAL
Qty: 5 | Refills: 0

## 2022-10-18 RX ORDER — HYDROMORPHONE HYDROCHLORIDE 2 MG/ML
4 INJECTION INTRAMUSCULAR; INTRAVENOUS; SUBCUTANEOUS ONCE
Refills: 0 | Status: DISCONTINUED | OUTPATIENT
Start: 2022-10-18 | End: 2022-10-18

## 2022-10-18 RX ORDER — METHOCARBAMOL 500 MG/1
750 TABLET, FILM COATED ORAL THREE TIMES A DAY
Refills: 0 | Status: DISCONTINUED | OUTPATIENT
Start: 2022-10-18 | End: 2022-10-18

## 2022-10-18 RX ORDER — ONDANSETRON 8 MG/1
4 TABLET, FILM COATED ORAL EVERY 6 HOURS
Refills: 0 | Status: DISCONTINUED | OUTPATIENT
Start: 2022-10-18 | End: 2022-10-18

## 2022-10-18 RX ORDER — CYCLOBENZAPRINE HYDROCHLORIDE 10 MG/1
1 TABLET, FILM COATED ORAL
Qty: 0 | Refills: 0 | DISCHARGE
Start: 2022-10-18

## 2022-10-18 RX ORDER — GABAPENTIN 400 MG/1
300 CAPSULE ORAL THREE TIMES A DAY
Refills: 0 | Status: DISCONTINUED | OUTPATIENT
Start: 2022-10-18 | End: 2022-10-18

## 2022-10-18 RX ORDER — VANCOMYCIN HCL 1 G
1.5 VIAL (EA) INTRAVENOUS
Qty: 1 | Refills: 0
Start: 2022-10-18

## 2022-10-18 RX ORDER — ACETAMINOPHEN 500 MG
2 TABLET ORAL
Qty: 0 | Refills: 0 | DISCHARGE

## 2022-10-18 RX ORDER — METHOCARBAMOL 500 MG/1
750 TABLET, FILM COATED ORAL ONCE
Refills: 0 | Status: COMPLETED | OUTPATIENT
Start: 2022-10-18 | End: 2022-10-18

## 2022-10-18 RX ORDER — GABAPENTIN 400 MG/1
1 CAPSULE ORAL
Qty: 90 | Refills: 0
Start: 2022-10-18

## 2022-10-18 RX ORDER — METHOCARBAMOL 500 MG/1
1 TABLET, FILM COATED ORAL
Qty: 30 | Refills: 0
Start: 2022-10-18

## 2022-10-18 RX ORDER — HYDROMORPHONE HYDROCHLORIDE 2 MG/ML
0.5 INJECTION INTRAMUSCULAR; INTRAVENOUS; SUBCUTANEOUS ONCE
Refills: 0 | Status: DISCONTINUED | OUTPATIENT
Start: 2022-10-18 | End: 2022-10-18

## 2022-10-18 RX ORDER — POLYETHYLENE GLYCOL 3350 17 G/17G
17 POWDER, FOR SOLUTION ORAL
Qty: 0 | Refills: 0 | DISCHARGE
Start: 2022-10-18

## 2022-10-18 RX ORDER — LIDOCAINE 4 G/100G
1 CREAM TOPICAL
Qty: 0 | Refills: 0 | DISCHARGE
Start: 2022-10-18

## 2022-10-18 RX ORDER — CYCLOBENZAPRINE HYDROCHLORIDE 10 MG/1
1 TABLET, FILM COATED ORAL
Qty: 0 | Refills: 0 | DISCHARGE

## 2022-10-18 RX ORDER — CEFEPIME 1 G/1
2 INJECTION, POWDER, FOR SOLUTION INTRAMUSCULAR; INTRAVENOUS
Qty: 1 | Refills: 0
Start: 2022-10-18

## 2022-10-18 RX ADMIN — ENOXAPARIN SODIUM 40 MILLIGRAM(S): 100 INJECTION SUBCUTANEOUS at 16:53

## 2022-10-18 RX ADMIN — GABAPENTIN 300 MILLIGRAM(S): 400 CAPSULE ORAL at 21:19

## 2022-10-18 RX ADMIN — GABAPENTIN 200 MILLIGRAM(S): 400 CAPSULE ORAL at 06:03

## 2022-10-18 RX ADMIN — LIDOCAINE 1 PATCH: 4 CREAM TOPICAL at 13:58

## 2022-10-18 RX ADMIN — CEFEPIME 100 MILLIGRAM(S): 1 INJECTION, POWDER, FOR SOLUTION INTRAMUSCULAR; INTRAVENOUS at 06:12

## 2022-10-18 RX ADMIN — CYCLOBENZAPRINE HYDROCHLORIDE 10 MILLIGRAM(S): 10 TABLET, FILM COATED ORAL at 21:16

## 2022-10-18 RX ADMIN — OXYCODONE HYDROCHLORIDE 15 MILLIGRAM(S): 5 TABLET ORAL at 07:36

## 2022-10-18 RX ADMIN — Medication 300 MILLIGRAM(S): at 20:48

## 2022-10-18 RX ADMIN — OXYCODONE HYDROCHLORIDE 15 MILLIGRAM(S): 5 TABLET ORAL at 15:11

## 2022-10-18 RX ADMIN — SENNA PLUS 2 TABLET(S): 8.6 TABLET ORAL at 21:19

## 2022-10-18 RX ADMIN — CEFEPIME 100 MILLIGRAM(S): 1 INJECTION, POWDER, FOR SOLUTION INTRAMUSCULAR; INTRAVENOUS at 13:58

## 2022-10-18 RX ADMIN — OXYCODONE HYDROCHLORIDE 15 MILLIGRAM(S): 5 TABLET ORAL at 06:36

## 2022-10-18 RX ADMIN — CEFEPIME 100 MILLIGRAM(S): 1 INJECTION, POWDER, FOR SOLUTION INTRAMUSCULAR; INTRAVENOUS at 20:04

## 2022-10-18 RX ADMIN — OXYCODONE HYDROCHLORIDE 15 MILLIGRAM(S): 5 TABLET ORAL at 15:40

## 2022-10-18 RX ADMIN — CYCLOBENZAPRINE HYDROCHLORIDE 10 MILLIGRAM(S): 10 TABLET, FILM COATED ORAL at 06:03

## 2022-10-18 RX ADMIN — LINACLOTIDE 290 MICROGRAM(S): 145 CAPSULE, GELATIN COATED ORAL at 06:03

## 2022-10-18 RX ADMIN — METHOCARBAMOL 750 MILLIGRAM(S): 500 TABLET, FILM COATED ORAL at 10:21

## 2022-10-18 RX ADMIN — POLYETHYLENE GLYCOL 3350 17 GRAM(S): 17 POWDER, FOR SOLUTION ORAL at 16:53

## 2022-10-18 RX ADMIN — Medication 5 MILLIGRAM(S): at 01:19

## 2022-10-18 RX ADMIN — CHLORHEXIDINE GLUCONATE 1 APPLICATION(S): 213 SOLUTION TOPICAL at 06:32

## 2022-10-18 RX ADMIN — CYCLOBENZAPRINE HYDROCHLORIDE 10 MILLIGRAM(S): 10 TABLET, FILM COATED ORAL at 13:58

## 2022-10-18 RX ADMIN — GABAPENTIN 300 MILLIGRAM(S): 400 CAPSULE ORAL at 13:58

## 2022-10-18 RX ADMIN — LIDOCAINE 1 PATCH: 4 CREAM TOPICAL at 22:48

## 2022-10-18 RX ADMIN — SIMETHICONE 80 MILLIGRAM(S): 80 TABLET, CHEWABLE ORAL at 22:01

## 2022-10-18 RX ADMIN — LIDOCAINE 1 PATCH: 4 CREAM TOPICAL at 17:42

## 2022-10-18 RX ADMIN — HYDROMORPHONE HYDROCHLORIDE 1 MILLIGRAM(S): 2 INJECTION INTRAMUSCULAR; INTRAVENOUS; SUBCUTANEOUS at 01:57

## 2022-10-18 RX ADMIN — Medication 300 MILLIGRAM(S): at 10:40

## 2022-10-18 NOTE — PROVIDER CONTACT NOTE (OTHER) - ASSESSMENT
pt. moaning an yelling out about muscle spasms. vitals stable at this time. pt AxOx4
Pt HR upon arrival to unit was noted to be 117bpm, /88. Pt states, "I am having muscle spasms."
Vanco trough 9.5.

## 2022-10-18 NOTE — PROGRESS NOTE ADULT - SUBJECTIVE AND OBJECTIVE BOX
Pt seen and examined. Sitting up in chair. Was able to ambulate in hallway and around floor.  Had episode of back spasm last night but significantly better now.  GARNER well with some mild left DF weakness and dec sensation.  Wound C & D    PICC line placed yesterday  ID note and recs appreciated.    Pt with improved pain compared to admission.  Plan to d/c home to cont' Abx and PT as outpt  F/U in office

## 2022-10-18 NOTE — DISCHARGE NOTE NURSING/CASE MANAGEMENT/SOCIAL WORK - NSDCPEFALRISK_GEN_ALL_CORE
For information on Fall & Injury Prevention, visit: [email or web address] OR  [email or web address] OR  https://www.cdc.gov/steadi/patient.html

## 2022-10-18 NOTE — DISCHARGE NOTE PROVIDER - NSDCFUADDINST_GEN_ALL_CORE_FT
please notify physician if fevers, bleeding, swelling, pain not relieved by medication, increased sluggishness or irritability, increased nausea or vomiting, inability to tolerate foods or liquids, new or increasing weakness/numbness/tingling.   please do not engage in strenuous activity, heavy lifting, drive, or return to work or school until cleared by surgeon.   please keep incision clean and dry, do not submerge wound in water for prolonged periods of time, pat dry after showering, and do not use any creams or ointments to incision.   Please cover incision with gauze and tape for shower as instructed and then remove after shower and pat dry and leave open to air.  Please do not take NSAIDs- ie. advil, motrin, ibuprofen, aleve, aspirin, naproxen, etc. Tylenol/ acetaminophen ok to take.

## 2022-10-18 NOTE — DISCHARGE NOTE NURSING/CASE MANAGEMENT/SOCIAL WORK - PATIENT PORTAL LINK FT
You can access the FollowMyHealth Patient Portal offered by [facility] by registering at the following website: [email or web address]. By joining [employer]’s FollowMyHealth portal, you will also be able to view your health information using other applications (apps) compatible with our system.

## 2022-10-18 NOTE — PROGRESS NOTE ADULT - PROVIDER SPECIALTY LIST ADULT
Internal Medicine
Internal Medicine
Neurosurgery
Infectious Disease
Internal Medicine
Internal Medicine
Neurosurgery
Neurosurgery
Infectious Disease
Infectious Disease
Internal Medicine
Neurosurgery

## 2022-10-18 NOTE — PROGRESS NOTE ADULT - ASSESSMENT
37F s/p L5 lami L5/S1 (9/13) with re-admission for pseudomonas bacteremia cleared on BCx prior to DC on cipro 750mg BID (ending 10/17).  Seen in office 10/10 for increased pain after BM. Put patient MR villarreal. Presented to ED for increased pain, midback. Normotensive, tachycardic, afebrile. Wound looks good: clean, dry, intact. Exam: AOx3, BUE 5/5 lowers pain limited, TTP, reports stable genital numbness.      back pain   - epidural abscess  - POD#3 from wound debridment and cultures.  - mild weakness left DF 4/5 with some decreased sensation dorsum improved   - Cx from OR pending NGTD  -  abx as per ID consult   - will need 6 weeks of iv abx  - place picc  - PT saw pt    recent pseudomonas bacteremia  - abx as per ID  - follow up cultures    constipation  - lactulose has not worked in the past  - cont linzess  - miralax bid  - enema or suppository prn    dvt px  - lovenox

## 2022-10-18 NOTE — PROGRESS NOTE ADULT - ASSESSMENT
HPI:   37F with PMH/PSH including lumbar radiculopathy s/p L5-S1 laminectomy and discectomy (9/13/22 Dr. Patricio) (ED 9/27/22, admission 9/9/22-9/15/22. re-admission 9/18/22-9/23/22) complicated by pseudomonas bacteremia (carbapenem resistant) MRI with post surgical collection presents to the ED with back pain.  Patient reports that she has been having "pins and needles" to LE since surgery, reports numbness in groin, buttocks, posterior LEs.  Reports she had previously been able to ambulate with cane but reports she had to strain with a BM a few days ago and since then has had increased pain and difficulty ambulating.  Patient reports she has been constipated, taking narcotics for pain control.  Reports was seen recently at Dr. Patricio's office but reports did not have this level of pain at the time of the last visit.  Review of EMR HIE Neurosx note from 10/10/22 reveals patient presented to office with "chief complaint of sharp pain in mid back incision which pulsates after having to strain for bowel movement two days ago."  Mother reports patient was able to ambulate earlier after receiving Fentanyl 100 from EMS.  Mother reports that she has been caring for wound, denies worsening appearance or increased drainage, reports following with plastics (seen initially inpatient 9/23/22, Dr. Carroll) about 1-2 weeks ago and was told to follow up in 4 weeks.  From EMR, MRI L spine w/wo IV contrast (9/19/22) "postsurgical changes emanating from the ventral epidural space occupies approximately 70-75% of the spinal canal diameter. The presence of superinfection (epidural phlegmon/abscess) is not entirely excluded. Enhancing soft tissue within the posterior aspect of the spinal canal at the L5-S1 level likely representing granulation/scar tissue.  Postsurgical seroma at the level of the laminectomy defect measures approximately 1.9 x 3.6 cm. Superinfection is not entirely excluded."  Denies fevers, chills.  Denies loss of urinary or bowel continence. Denies chest pain, shortness of breath, abdominal pain, nausea, vomiting, diarrhea, urinary complaints. (13 Oct 2022 13:07)    PAST MEDICAL & SURGICAL HISTORY:  Lumbar herniated disc      S/P laminectomy  L5-S1 on 9/13/2022    PROCEDURE:  10/14/22 S/P lumbar wound revision and washout of epidural collection, exploration of previous L5-S1 discectomy, skin debridement  OR cultures neg to date. treating for pseudomonas per ID.     PLAN:  Neuro: changed to oxycodone 15mg with good relief, incr neurontin given post op left radiculopathy; flexeril ATC and robaxin PRN added  Respiratory: patient instructed on incentive spirometer  CV: normotensive no meds  Heme/Onc: f/u am cbc, stable           DVT ppx: [] SQH [x] SQL and venodynes bilaterally  Renal: IVL and voiding well  ID: OR cultures negative to date however was previously on abx, for pseudomonas bacteremia  ID consult noted- final ID antibiotic plan for discharge- 6-8 weeks of IV vanco/cefepime, VNS arranged. +PICC  GI: bowel regimen, known chronic constipation, home linzess started   PT/OT: outpt PT upon d/c  Mom at bedside and updated; seen earlier with [facility] Patricio at [facility]  f/u am labs  d/c IVL and d/c home today with VNS after last dose of abx tonight- doses to be given a bit earlier    discussed with Dr Patricio at bedside  [facility] # [id number]

## 2022-10-18 NOTE — PROGRESS NOTE ADULT - SUBJECTIVE AND OBJECTIVE BOX
Patient is a 37y old  Female who presents with a chief complaint of lumbar discitis/osteo (14 Oct 2022 08:55)    f/u epidural infection    Interval History/ROS:  no fever.  back better. OR cultures to date are negative.  awaiting PICC.  Remainder of ROS otherwise negative.    PAST MEDICAL & SURGICAL HISTORY:  Lumbar herniated disc  S/P laminectomy L5-S1 on 9/13/2022    Allergies  No Known Allergies    ANTIMICROBIALS:  cefepime   IVPB 2000 every 8 hoursonce (10/13-)  vancomycin  IVPB 1500 every 12 hours (10/14-)    MEDICATIONS  (STANDING):  cyclobenzaprine 10 three times a day  enoxaparin Injectable 40 <User Schedule>  gabapentin 200 every 8 hours  influenza   Vaccine 0.5 once  linaclotide 290 before breakfast  mineral oil enema 133 once  polyethylene glycol 3350 17 two times a day  senna 2 at bedtime    Vital Signs Last 24 Hrs  T(F): 97.8 (10-18-22 @ 08:36), Max: 98.9 (10-17-22 @ 20:11)  HR: 101 (10-18-22 @ 08:36)  BP: 116/76 (10-18-22 @ 08:36)  RR: 16 (10-18-22 @ 08:36)  SpO2: 95% (10-18-22 @ 08:36) (93% - 97%)    PHYSICAL EXAM:  Constitutional: non-toxic  HEAD/EYES: anicteric  ENT:  supple  Cardiovascular:   normal S1, S2  Respiratory:  clear BS bilaterally  GI:  soft, non-tender, normal bowel sounds  :  no walker  Musculoskeletal:  no synovitis, normal ROM  Neurologic: awake and alert, normal strength, no focal findings  Skin:  surgical site healing  Psychiatric:  awake, alert, appropriate mood                        11.0   11.05 )-----------( 343      ( 18 Oct 2022 06:50 )             32.8 10-18    138  |  101  |  10  ----------------------------<  111  3.7   |  25  |  0.64  Ca    9.0      18 Oct 2022 06:53  TPro  6.6  /  Alb  3.8  /  TBili  0.3  /  DBili  x   /  AST  9   /  ALT  14  /  AlkPhos  59  10-18    Sedimentation Rate, Erythrocyte: 59 (10-13 @ 10:35)  Sedimentation Rate, Erythrocyte: 72 (09-19 @ 15:39)    Procalcitonin, Serum: 0.04 (10-13 @ 10:34)  Procalcitonin, Serum: 0.10 (09-19 @ 16:48)    C-Reactive Protein, Serum: 33 (10-13 @ 10:34)    MICROBIOLOGY:  Vancomycin Level, Trough: 9.7 (10-17 @ 08:33)  Vancomycin Level, Trough: 18.3 (10-16 @ 07:15)  Vancomycin Level, Trough: 9.5 (10-15 @ 07:49)    Culture - Tissue with Gram Stain (collected 10-14-22 @ 00:05)  Source: .Tissue Surgical Swab  Gram Stain (10-14-22 @ 23:06):    No polymorphonuclear leukocytes per low power field    No organisms seen per oil power field  Preliminary Report (10-15-22 @ 14:42):    No growth    Culture - Fungal, Tissue (collected 10-14-22 @ 00:05)  Source: .Tissue Other  Preliminary Report (10-17-22 @ 11:10):    Testing in progress    Culture - Surgical Swab (collected 10-14-22 @ 00:02)  Source: .Surgical Swab Surgical Swab  Preliminary Report (10-15-22 @ 08:25):    No growth    Culture - Fungal, Other (collected 10-14-22 @ 00:02)  Source: .Other Other  Preliminary Report (10-17-22 @ 11:02):    Testing in progress    Culture - Surgical Swab (collected 10-13-22 @ 23:59)  Source: .Surgical Swab Surgical Swab  Preliminary Report (10-15-22 @ 09:01):    No growth    Culture - Fungal, Other (collected 10-13-22 @ 23:58)  Source: .Other Other  Preliminary Report (10-17-22 @ 11:02):    Testing in progress    Culture - Fungal, Tissue (collected 10-13-22 @ 23:57)  Source: .Tissue Other  Preliminary Report (10-17-22 @ 11:02):    Testing in progress    Culture - Tissue with Gram Stain (collected 10-13-22 @ 23:57)  Source: .Tissue Surgical Swab  Gram Stain (10-14-22 @ 23:07):    No polymorphonuclear leukocytes per low power field    No organisms seen per oil power field  Preliminary Report (10-15-22 @ 14:44):    No growth    Culture - Fungal, Tissue (collected 10-13-22 @ 23:55)  Source: .Tissue Other  Preliminary Report (10-17-22 @ 11:10):    Testing in progress    Culture - Tissue with Gram Stain (collected 10-13-22 @ 23:55)  Source: .Tissue Surgical Swab  Gram Stain (10-14-22 @ 23:07):    Few polymorphonuclear leukocytes per low power field    No organisms seen per oil power field  Preliminary Report (10-15-22 @ 14:43):    No growth    Culture - Fungal, Other (collected 10-13-22 @ 23:54)  Source: .Other Other  Preliminary Report (10-17-22 @ 11:14):    Testing in progress    Culture - Surgical Swab (collected 10-13-22 @ 23:53)  Source: .Surgical Swab Surgical Swab  Preliminary Report (10-15-22 @ 09:02):    No growth    Culture - Surgical Swab (collected 10-13-22 @ 23:52)  Source: .Surgical Swab Surgical Swab  Preliminary Report (10-15-22 @ 08:26):    No growth    Culture - Fungal, Other (collected 10-13-22 @ 23:52)  Source: .Other Other  Preliminary Report (10-17-22 @ 11:02):    Testing in progress    Culture - Fungal, Other (collected 10-13-22 @ 23:43)  Source: .Other Other  Preliminary Report (10-17-22 @ 11:14):    Testing in progress    Culture - Surgical Swab (collected 10-13-22 @ 23:41)  Source: .Surgical Swab Surgical Swab  Preliminary Report (10-15-22 @ 09:00):    No growth    Culture - Blood (collected 10-13-22 @ 10:10)  Source: .Blood Blood-Peripheral  Preliminary Report (10-14-22 @ 16:02):    No growth to date.    Culture - Blood (collected 10-13-22 @ 09:55)  Source: .Blood Blood-Peripheral  Preliminary Report (10-14-22 @ 16:02):    No growth to date.    Culture - Blood (collected 21 Sep 2022 07:59)  Source: .Blood Blood-Venous  Final Report:    No Growth Final    Culture - Blood (collected 21 Sep 2022 07:59)  Source: .Blood Blood  Final Report:    No Growth Final    Culture - Blood (collected 18 Sep 2022 22:28)  Source: .Blood Blood-Peripheral  Final Report:    Growth in aerobic bottle: Pseudomonas aeruginosa (Carbapenem Resistant)    ***Blood Panel PCR results on this specimen are available    approximately 3 hours after the Gram stain result.***    Gram stain, PCR, and/or culture results may not always    correspond due to difference in methodologies.    ************************************************************    This PCR assay was performed by multiplex PCR. This    Assay tests for 66 bacterial and resistance gene targets.    Please refer to the [facility] test directory    at [email or web address] for details.  Organism: Blood Culture PCR  Pseudomonas aeruginosa (Carbapenem Resistant)  Organism: Pseudomonas aeruginosa (Carbapenem Resistant)    Sensitivities:      -  Amikacin: S <=16      -  Aztreonam: S <=4      -  Cefepime: S <=2      -  Ceftazidime: S <=1      -  Ceftazidime/Avibactam: S <=4      -  Ceftolozane/tazobactam: S <=2      -  Ciprofloxacin: S <=0.25      -  Gentamicin: S <=2      -  Imipenem: R >8      -  Levofloxacin: S <=0.5      -  Meropenem: I 4      -  Piperacillin/Tazobactam: S <=8      -  Tobramycin: S <=2      Method Type: NADEEM  Organism: Blood Culture PCR    Sensitivities:      -  Pseudomonas aeruginosa: Detec      Method Type: PCR    Culture - Blood (collected 18 Sep 2022 22:17)  Source: .Blood Blood-Peripheral  Final Report:    No Growth Final    HIV-1/2 Combo Result: Nonreact (09-07-22 @ 12:22)    COVID-19 PCR: NotDetec (10-13-22 @ 10:35)  COVID-19 PCR: NotDetec (09-18-22 @ 05:24)  COVID-19 PCR: NotDetec (09-09-22 @ 12:54)    RADIOLOGY:  imaging below personally reviewed    MR Lumbar Spine w/wo IV Cont (10.13.22 @ 16:15) >  Impression: Findings consistent with osteomyelitis and discitis at L5-S1 with a small ventral epidural abscess. There is no abnormal signal involving the endplates of L5 and S1 with a small amount of periosteal   and ventral epidural enhancement since 9/19/2022. Small fluid collection at the level of the laminectomy is nonspecific but may represent and a sterile or infected collection, seroma or a contained leak. There is also   nonspecific enhancement of the nerve in the cauda equina may represent an infectious or inflammatory process.    VA Duplex Lower Ext Vein Scan, Bilat (09.19.22 @ 11:19) >  IMPRESSION:  No evidence of deep venous thrombosis in either lower extremity.    CT Lumbar Spine No Cont (09.18.22 @ 05:41) >  IMPRESSION:  Status post L5 laminectomy with postsurgical changes as described above.    MR Lumbar Spine No Cont (09.11.22 @ 13:12) >  IMPRESSION: Large central and left-sided disc herniation L5-S1 with significant thecal sac compression and extension down behind the S1 vertebral body with a probable sequestered fragment.    MR Lumbar Spine No Cont (09.09.22 @ 21:32) >  IMPRESSION:  Limited exam due to lack of axial imaging. Degenerative changes at L5-S1 with a suspected a large central disc herniation. Axial images through the L5-S1 level can be obtained to complete evaluation.

## 2022-10-18 NOTE — PROGRESS NOTE ADULT - SUBJECTIVE AND OBJECTIVE BOX
CHIEF COMPLAINT: s/p PICC; had bouts of severe muscle spasms with good relief after robaxin 750mg, ambulating halls with rolling walker  final ID antibiotic plan for discharge- 6-8 weeks of IV vanco/cefepime, VNS arranged.     Vital Signs Last 24 Hrs  T(C): 36.4 (18 Oct 2022 12:06), Max: 37.2 (17 Oct 2022 20:11)  T(F): 97.6 (18 Oct 2022 12:06), Max: 98.9 (17 Oct 2022 20:11)  HR: 107 (18 Oct 2022 12:06) (101 - 110)  BP: 104/69 (18 Oct 2022 12:06) (102/69 - 116/76)  BP(mean): --  RR: 16 (18 Oct 2022 12:06) (16 - 18)  SpO2: 98% (18 Oct 2022 12:06) (93% - 98%)    Parameters below as of 18 Oct 2022 12:06  Patient On (Oxygen Delivery Method): room air      PHYSICAL EXAM:    General: No Acute Distress     Neurological: Awake, alert oriented to person, place and time, Following Commands, PERRL, EOMI, Face Symmetrical, Speech Fluent, Moving all extremities, full strenght except Left DF/PF 4+/5 and decreased sensation left LE throughout but decreased more distally otherwise R LE intact sensation   Pulmonary: Clear to Auscultation, No Rales, No Rhonchi, No Wheezes     Cardiovascular: S1, S2, Regular Rate and Rhythm     Gastrointestinal: Soft, Nontender, Nondistended     Incision: +sutures c/d/i    LABS:                        11.0   11.05 )-----------( 343      ( 18 Oct 2022 06:50 )             32.8   10-18    138  |  101  |  10  ----------------------------<  111<H>  3.7   |  25  |  0.64    Ca    9.0      18 Oct 2022 06:53    TPro  6.6  /  Alb  3.8  /  TBili  0.3  /  DBili  x   /  AST  9<L>  /  ALT  14  /  AlkPhos  59  10-18               MEDICATIONS  (STANDING):  cefepime   IVPB 2000 milliGRAM(s) IV Intermittent every 8 hours  chlorhexidine 2% Cloths 1 Application(s) Topical <User Schedule>  cyclobenzaprine 10 milliGRAM(s) Oral three times a day  enoxaparin Injectable 40 milliGRAM(s) SubCutaneous <User Schedule>  gabapentin 300 milliGRAM(s) Oral three times a day  influenza   Vaccine 0.5 milliLiter(s) IntraMuscular once  lidocaine   4% Patch 1 Patch Transdermal every 24 hours  linaclotide 290 MICROGram(s) Oral before breakfast  mineral oil enema 133 milliLiter(s) Rectal once  polyethylene glycol 3350 17 Gram(s) Oral two times a day  senna 2 Tablet(s) Oral at bedtime  vancomycin  IVPB 1500 milliGRAM(s) IV Intermittent every 12 hours    MEDICATIONS  (PRN):  acetaminophen     Tablet .. 650 milliGRAM(s) Oral every 6 hours PRN Mild Pain (1 - 3)  bisacodyl 5 milliGRAM(s) Oral every 12 hours PRN Constipation  lactulose Syrup 20 Gram(s) Oral daily PRN Constiation  methocarbamol 750 milliGRAM(s) Oral three times a day PRN Muscle Spasm  ondansetron Injectable 4 milliGRAM(s) IV Push every 6 hours PRN Nausea and/or Vomiting  oxyCODONE    IR 15 milliGRAM(s) Oral every 4 hours PRN Severe Pain (7 - 10)  simethicone 80 milliGRAM(s) Chew every 4 hours PRN Upset Stomach  sodium chloride 0.9% lock flush 10 milliLiter(s) IV Push every 1 hour PRN Pre/post blood products, medications, blood draw, and to maintain line patency    DIET: [x] Regular [] CCD [] Renal [] Puree [] Dysphagia [] Tube Feeds:

## 2022-10-18 NOTE — PROGRESS NOTE ADULT - SUBJECTIVE AND OBJECTIVE BOX
DATE OF SERVICE: 10-18-22 @ 12:35    Patient is a 37y old  Female who presents with a chief complaint of 10/14/22 S/P lumbar wound revision and washout of epidural collection, exploration of previous L5-S1 discectomy, skin debridement (17 Oct 2022 15:44)      SUBJECTIVE / OVERNIGHT EVENTS:  had BM, pain is not well controlled but improved with higher dose of oxy. PICC line was placed    MEDICATIONS  (STANDING):  cefepime   IVPB 2000 milliGRAM(s) IV Intermittent every 8 hours  chlorhexidine 2% Cloths 1 Application(s) Topical <User Schedule>  cyclobenzaprine 10 milliGRAM(s) Oral three times a day  enoxaparin Injectable 40 milliGRAM(s) SubCutaneous <User Schedule>  gabapentin 200 milliGRAM(s) Oral every 8 hours  influenza   Vaccine 0.5 milliLiter(s) IntraMuscular once  lidocaine   4% Patch 1 Patch Transdermal every 24 hours  linaclotide 290 MICROGram(s) Oral before breakfast  mineral oil enema 133 milliLiter(s) Rectal once  polyethylene glycol 3350 17 Gram(s) Oral two times a day  senna 2 Tablet(s) Oral at bedtime  vancomycin  IVPB 1500 milliGRAM(s) IV Intermittent every 12 hours    MEDICATIONS  (PRN):  acetaminophen     Tablet .. 650 milliGRAM(s) Oral every 6 hours PRN Mild Pain (1 - 3)  bisacodyl 5 milliGRAM(s) Oral every 12 hours PRN Constipation  diazepam    Tablet 5 milliGRAM(s) Oral every 8 hours PRN spasms  lactulose Syrup 20 Gram(s) Oral daily PRN Constiation  methocarbamol 500 milliGRAM(s) Oral three times a day PRN Muscle Spasm  ondansetron Injectable 4 milliGRAM(s) IV Push every 6 hours PRN Nausea and/or Vomiting  simethicone 80 milliGRAM(s) Chew every 4 hours PRN Upset Stomach  sodium chloride 0.9% lock flush 10 milliLiter(s) IV Push every 1 hour PRN Pre/post blood products, medications, blood draw, and to maintain line patency  traMADol 50 milliGRAM(s) Oral every 4 hours PRN Moderate Pain (4 - 6)      Vital Signs Last 24 Hrs  T(C): 36.4 (18 Oct 2022 12:06), Max: 37.2 (17 Oct 2022 20:11)  T(F): 97.6 (18 Oct 2022 12:06), Max: 98.9 (17 Oct 2022 20:11)  HR: 107 (18 Oct 2022 12:06) (101 - 110)  BP: 104/69 (18 Oct 2022 12:06) (102/69 - 131/79)  BP(mean): --  RR: 16 (18 Oct 2022 12:06) (16 - 18)  SpO2: 98% (18 Oct 2022 12:06) (93% - 98%)    Parameters below as of 18 Oct 2022 12:06  Patient On (Oxygen Delivery Method): room air      CAPILLARY BLOOD GLUCOSE        I&O's Summary    17 Oct 2022 07:01  -  18 Oct 2022 07:00  --------------------------------------------------------  IN: 1460 mL / OUT: 0 mL / NET: 1460 mL    18 Oct 2022 07:01  -  18 Oct 2022 12:35  --------------------------------------------------------  IN: 120 mL / OUT: 0 mL / NET: 120 mL        PHYSICAL EXAM:  GENERAL: NAD, well-developed  HEAD:  Atraumatic, Normocephalic  EYES: EOMI, PERRLA, conjunctiva and sclera clear  NECK: Supple, No JVD  CHEST/LUNG: Clear to auscultation bilaterally; No wheeze  HEART: Regular rate and rhythm; No murmurs, rubs, or gallops  ABDOMEN: Soft, Nontender, Nondistended; Bowel sounds present  EXTREMITIES:  2+ Peripheral Pulses, No clubbing, cyanosis, or edema  PSYCH: AAOx3  NEUROLOGY: non-focal  SKIN: No rashes or lesions    LABS:                        11.0   11.05 )-----------( 343      ( 18 Oct 2022 06:50 )             32.8     10-18    138  |  101  |  10  ----------------------------<  111<H>  3.7   |  25  |  0.64    Ca    9.0      18 Oct 2022 06:53    TPro  6.6  /  Alb  3.8  /  TBili  0.3  /  DBili  x   /  AST  9<L>  /  ALT  14  /  AlkPhos  59  10-18              RADIOLOGY & ADDITIONAL TESTS:    Imaging Personally Reviewed:    Consultant(s) Notes Reviewed:      Care Discussed with Consultants/Other Providers:

## 2022-10-18 NOTE — PROVIDER CONTACT NOTE (OTHER) - BACKGROUND
S/p washout L5-S1.
admitted for increased pain from previous lami on 9/13. pt. had washout and revision done on 10/13.
Pt s/p revision of lumbar wound, washout of epidural collection.

## 2022-10-18 NOTE — PROGRESS NOTE ADULT - ASSESSMENT
37F with hx lumbar radiculopathy s/p L5-S1 laminectomy and discectomy on 9/13/22.  Readmitted 9/18/22-9/23/22 for increasing back pain. Found to have pseudomonas bacteremia (carbapenem resistant) MRI with post surgical seroma.  She was treated with IV cefepime and discharged on oral ciprofloxacin which she was to complete 10/17.  Returned again 10/13 as she lost strength to her legs.  Here, MRI noted epidural collection.  She was taken to the OR and underwent washout of epidural collection and lumbar wound revision. Drained cloudy fluid, not cornelio pus. Tissue cultures collected.    Epidural abscess  - suspect pseudomonas aeruginosa given recent bacteremia with PA  - will cover for possible staph/MRSA with vancomycin   - f/u cultures  - continue vancomycin and cefepime  - at least until 11/24/2022  - weekly cbc, cmp, vancomycin trough, esr/crp to fax to [phone]  - attn:  Raysa Nam/Tanisha  - PICC placed    I have discussed plan of care as detailed above with neurosurgery PA

## 2022-10-18 NOTE — DISCHARGE NOTE PROVIDER - CARE PROVIDER_API CALL
Austin Patricio)  Neurosurgery  [address] [facility] [address], [address]  [place] [address]  Phone: [phone]  Fax: [phone]  Follow Up Time:     Yo Nam)  Internal Medicine  [address] [place] [id number]  Phone: [phone]  Fax: [phone]  Follow Up Time:

## 2022-10-18 NOTE — PROGRESS NOTE ADULT - REASON FOR ADMISSION
discitis/osteomyelitis
lumbar discitis/osteo
10/14/22 S/P lumbar wound revision and washout of epidural collection, exploration of previous L5-S1 discectomy, skin debridement
LLE weakness
10/14/22 S/P lumbar wound revision and washout of epidural collection, exploration of previous L5-S1 discectomy, skin debridement
10/14/22 S/P lumbar wound revision and washout of epidural collection, exploration of previous L5-S1 discectomy, skin debridement

## 2022-10-18 NOTE — DISCHARGE NOTE PROVIDER - CARE PROVIDERS DIRECT ADDRESSES
[email or web address]

## 2022-10-18 NOTE — DISCHARGE NOTE PROVIDER - NSDCCPCAREPLAN_GEN_ALL_CORE_FT
PRINCIPAL DISCHARGE DIAGNOSIS  Diagnosis: Spinal epidural abscess  Assessment and Plan of Treatment: 10/14/22 S/P lumbar wound revision and washout of epidural collection, exploration of previous L5-S1 discectomy, skin debridement. OR cultures negative and treating for pseudomonas x 6 weeks per ID given recent pseudomonas. 10/17/22 s/p Right PICC placed.      SECONDARY DISCHARGE DIAGNOSES  Diagnosis: Constipation  Assessment and Plan of Treatment: continue home linzess and bowel regimen

## 2022-10-18 NOTE — DISCHARGE NOTE PROVIDER - NSDCFUSCHEDAPPT_GEN_ALL_CORE_FT
[facility]  MRI  [facility]  Scheduled Appointment: 10/21/2022    Madisyn Torres  [facility]  SPINECTR [address] [facility]  Scheduled Appointment: 10/28/2022    [facility]  PLASTICSUR [address] [facility]  Scheduled Appointment: 11/01/2022    Yo Nam  [facility]  INFDISEASE [address]  Scheduled Appointment: 11/01/2022    Austin Patricio  [facility]  SPINECTR [address] [facility]  Scheduled Appointment: 12/07/2022

## 2022-10-18 NOTE — PROVIDER CONTACT NOTE (OTHER) - ACTION/TREATMENT ORDERED:
Give am dose as ordered.
MD notified. came to bedside to assess pt. 1mg dilaudid IVP given as ordered with relief noted
As per MD Emanuel, will continue to monitor. No further interventions ordered at this time.

## 2022-10-18 NOTE — DISCHARGE NOTE PROVIDER - NSDCHHNEEDSERVICEOTHER_GEN_ALL_CORE_FT
s/p PICC for 6 weeks of IV vanco/cefepime s/p PICC for 6 weeks of IV vanco/cefepime for lumbar wound infection

## 2022-10-18 NOTE — DISCHARGE NOTE PROVIDER - REASON FOR ADMISSION
10/14/22 S/P lumbar wound revision and washout of epidural collection, exploration of previous L5-S1 discectomy, skin debridement. OR cultures negative and treating for pseudomonas x 6 weeks per ID. 10/17/22 s/p Right PICC placed.    10/14/22 S/P lumbar wound revision and washout of epidural collection, exploration of previous L5-S1 discectomy, skin debridement. OR cultures negative and treating for pseudomonas x 6 weeks per ID given recent pseudomonas. 10/17/22 s/p Right PICC placed.

## 2022-10-18 NOTE — DISCHARGE NOTE PROVIDER - NSDCMRMEDTOKEN_GEN_ALL_CORE_FT
acetaminophen 325 mg oral tablet: 2 tab(s) orally every 6 hours, As needed, Mild Pain (1 - 3)  acetaminophen 325 mg oral tablet: 2 tab(s) orally every 6 hours, As needed, Mild Pain (1 - 3)  cefepime 2 g intravenous injection: 2 gram(s) intravenous every 8 hours via PICC through 11/24/22.   DX: s/p spinal wound infection  ciprofloxacin 750 mg oral tablet: 1 tab(s) orally 2 times a day until 10/17/22   cyclobenzaprine 10 mg oral tablet: 1 tab(s) orally , As Needed  linaclotide 145 mcg oral capsule: 1 cap(s) orally once a day  oxyCODONE 10 mg oral tablet: 1 tab(s) orally every 6 to 8 hours, As Needed -Severe Pain (7 - 10)  pantoprazole 40 mg oral delayed release tablet: 1 tab(s) orally once a day  PICC flushes : Please flush PICC pre/post antibiotics per protocol to complete 6 weeks per ID    polyethylene glycol 3350 oral powder for reconstitution: 17 gram(s) orally 2 times a day  senna leaf extract oral tablet: 2 tab(s) orally once a day (at bedtime)  Tylenol 500 mg oral tablet: 2 tab(s) orally , As Needed  vancomycin 1.5 g intravenous injection: 1.5 gram(s) intravenous every 12 hours via PICC through 11/24/22.   DX: spinal wound infection    weekly Labs:: weekly cbc, cmp, vancomycin trough, esr/crp to fax to [phone] Dr Nam/Dr Garay  Xanax 0.5 mg oral tablet: 1 tab(s) orally , As Needed   acetaminophen 325 mg oral tablet: 2 tab(s) orally every 6 hours, As needed, Mild Pain (1 - 3)  bisacodyl 5 mg oral delayed release tablet: 1 tab(s) orally every 12 hours, As needed, Constipation  cefepime 2 g intravenous injection: 2 gram(s) intravenous every 8 hours via PICC through 11/24/22.   DX: s/p spinal wound infection  cyclobenzaprine 10 mg oral tablet: 1 tab(s) orally 3 times a day  gabapentin 300 mg oral capsule: 1 cap(s) orally 3 times a day  lidocaine 4% topical film: Apply topically to affected area once a day  linaclotide 145 mcg oral capsule: 1 cap(s) orally once a day  methocarbamol 750 mg oral tablet: 1 tab(s) orally 3 times a day, As needed, Muscle Spasm  oxyCODONE 15 mg oral tablet: 0.5 - 1 tab(s) orally every 4-6 hrs as needed MDD:4  polyethylene glycol 3350 oral powder for reconstitution: 17 gram(s) orally 2 times a day  senna leaf extract oral tablet: 2 tab(s) orally once a day (at bedtime)  vancomycin 1.5 g intravenous injection: 1.5 gram(s) intravenous every 12 hours via PICC through 11/24/22.   DX: spinal wound infection    Xanax 0.5 mg oral tablet: 1 tab(s) orally , As Needed

## 2022-10-18 NOTE — DISCHARGE NOTE PROVIDER - HOSPITAL COURSE
HPI:   37F with PMH/PSH including lumbar radiculopathy s/p L5-S1 laminectomy and discectomy (9/13/22 Dr. Patricio) (ED 9/27/22, admission 9/9/22-9/15/22. re-admission 9/18/22-9/23/22) complicated by pseudomonas bacteremia (carbapenem resistant) MRI with post surgical collection presents to the ED with back pain.  Patient reports that she has been having "pins and needles" to LE since surgery, reports numbness in groin, buttocks, posterior LEs.  Reports she had previously been able to ambulate with cane but reports she had to strain with a BM a few days ago and since then has had increased pain and difficulty ambulating.  Patient reports she has been constipated, taking narcotics for pain control.  Reports was seen recently at Dr. Patricio's office but reports did not have this level of pain at the time of the last visit.  Review of EMR HIE Neurosx note from 10/10/22 reveals patient presented to office with "chief complaint of sharp pain in mid back incision which pulsates after having to strain for bowel movement two days ago."  Mother reports patient was able to ambulate earlier after receiving Fentanyl 100 from EMS.  Mother reports that she has been caring for wound, denies worsening appearance or increased drainage, reports following with plastics (seen initially inpatient 9/23/22, Dr. Carroll) about 1-2 weeks ago and was told to follow up in 4 weeks.  From EMR, MRI L spine w/wo IV contrast (9/19/22) "postsurgical changes emanating from the ventral epidural space occupies approximately 70-75% of the spinal canal diameter. The presence of superinfection (epidural phlegmon/abscess) is not entirely excluded. Enhancing soft tissue within the posterior aspect of the spinal canal at the L5-S1 level likely representing granulation/scar tissue.  Postsurgical seroma at the level of the laminectomy defect measures approximately 1.9 x 3.6 cm. Superinfection is not entirely excluded."  Denies fevers, chills.  Denies loss of urinary or bowel continence. Denies chest pain, shortness of breath, abdominal pain, nausea, vomiting, diarrhea, urinary complaints. (13 Oct 2022 13:07)    Patient admitted and underwent an MRI revealing findings consistent with osteomyelitis and discitis at L5-S1 with a small ventral epidural abscess. She was taken to the OR   10/14/22 S/P lumbar wound revision and washout of epidural collection, exploration of previous L5-S1 discectomy, skin debridement. ID was consulted and OR cultures remain negative and treating for pseudomonas x 6 weeks per ID with vanco/cefepime. 10/17/22 s/p Right PICC placed at bedside. Surgical drain removed prior to discharge. Pain management remained challenging for her throughout her stay however adequate at time of discharge. PT/OT evaluated her and recommended outpatient PT               HPI:   37F with PMH/PSH including lumbar radiculopathy s/p L5-S1 laminectomy and discectomy (9/13/22 Dr. Patricio) (ED 9/27/22, admission 9/9/22-9/15/22. re-admission 9/18/22-9/23/22) complicated by pseudomonas bacteremia (carbapenem resistant) MRI with post surgical collection presents to the ED with back pain.  Patient reports that she has been having "pins and needles" to LE since surgery, reports numbness in groin, buttocks, posterior LEs.  Reports she had previously been able to ambulate with cane but reports she had to strain with a BM a few days ago and since then has had increased pain and difficulty ambulating.  Patient reports she has been constipated, taking narcotics for pain control.  Reports was seen recently at Dr. Patricio's office but reports did not have this level of pain at the time of the last visit.  Review of EMR HIE Neurosx note from 10/10/22 reveals patient presented to office with "chief complaint of sharp pain in mid back incision which pulsates after having to strain for bowel movement two days ago."  Mother reports patient was able to ambulate earlier after receiving Fentanyl 100 from EMS.  Mother reports that she has been caring for wound, denies worsening appearance or increased drainage, reports following with plastics (seen initially inpatient 9/23/22, Dr. Carroll) about 1-2 weeks ago and was told to follow up in 4 weeks.  From EMR, MRI L spine w/wo IV contrast (9/19/22) "postsurgical changes emanating from the ventral epidural space occupies approximately 70-75% of the spinal canal diameter. The presence of superinfection (epidural phlegmon/abscess) is not entirely excluded. Enhancing soft tissue within the posterior aspect of the spinal canal at the L5-S1 level likely representing granulation/scar tissue.  Postsurgical seroma at the level of the laminectomy defect measures approximately 1.9 x 3.6 cm. Superinfection is not entirely excluded."  Denies fevers, chills.  Denies loss of urinary or bowel continence. Denies chest pain, shortness of breath, abdominal pain, nausea, vomiting, diarrhea, urinary complaints. (13 Oct 2022 13:07)    Patient admitted and underwent an MRI revealing findings consistent with osteomyelitis and discitis at L5-S1 with a small ventral epidural abscess. She was taken to the OR   10/14/22 S/P lumbar wound revision and washout of epidural collection, exploration of previous L5-S1 discectomy, skin debridement. She had routine post operative care. She was seen and followed in consult by  medicine. ID was consulted and OR cultures remained negative and treating for pseudomonas given previous pseudomonas bacteremia x 6 weeks per ID with vanco/cefepime. 10/17/22 s/p Right PICC placed at bedside. Surgical drain removed prior to discharge. MRIs of cervical and thoracic spine showed no evidence of infection. Pain management remained challenging for her throughout her stay however adequate at time of discharge. PT/OT evaluated her and recommended outpatient PT. On the day of discharge she is medically and neurologically stable for discharge to home with homecare.

## 2022-10-18 NOTE — DISCHARGE NOTE PROVIDER - NSDCFUADDAPPT_GEN_ALL_CORE_FT
Please follow up with:  1-Dr Patricio- neurosurgeon in 1-2 weeks;   2- Dr Nam- Infectious disease within 1-2 weeks.   Continue IV antibiotics vancomycin and cefepime through 11/24/22 to complete 6 weeks per ID.

## 2022-10-19 ENCOUNTER — NON-APPOINTMENT (OUTPATIENT)
Age: 37
End: 2022-10-19

## 2022-10-19 LAB

## 2022-10-20 ENCOUNTER — NON-APPOINTMENT (OUTPATIENT)
Age: 37
End: 2022-10-20

## 2022-10-20 LAB
-  AMIKACIN: SIGNIFICANT CHANGE UP
-  AZTREONAM: SIGNIFICANT CHANGE UP
-  CEFEPIME: SIGNIFICANT CHANGE UP
-  CEFTAZIDIME/AVIBACTAM: SIGNIFICANT CHANGE UP
-  CEFTAZIDIME: SIGNIFICANT CHANGE UP
-  CEFTOLOZANE/TAZOBACTAM: SIGNIFICANT CHANGE UP
-  CIPROFLOXACIN: SIGNIFICANT CHANGE UP
-  GENTAMICIN: SIGNIFICANT CHANGE UP
-  IMIPENEM: SIGNIFICANT CHANGE UP
-  LEVOFLOXACIN: SIGNIFICANT CHANGE UP
-  MEROPENEM: SIGNIFICANT CHANGE UP
-  PIPERACILLIN/TAZOBACTAM: SIGNIFICANT CHANGE UP
-  TOBRAMYCIN: SIGNIFICANT CHANGE UP
CULTURE RESULTS: SIGNIFICANT CHANGE UP
METHOD TYPE: SIGNIFICANT CHANGE UP
ORGANISM # SPEC MICROSCOPIC CNT: SIGNIFICANT CHANGE UP
ORGANISM # SPEC MICROSCOPIC CNT: SIGNIFICANT CHANGE UP
SPECIMEN SOURCE: SIGNIFICANT CHANGE UP

## 2022-10-21 ENCOUNTER — TRANSCRIPTION ENCOUNTER (OUTPATIENT)
Age: 37
End: 2022-10-21

## 2022-10-21 ENCOUNTER — APPOINTMENT (OUTPATIENT)
Dept: MRI IMAGING | Facility: CLINIC | Age: 37
End: 2022-10-21

## 2022-10-21 ENCOUNTER — NON-APPOINTMENT (OUTPATIENT)
Age: 37
End: 2022-10-21

## 2022-10-24 ENCOUNTER — NON-APPOINTMENT (OUTPATIENT)
Age: 37
End: 2022-10-24

## 2022-10-24 ENCOUNTER — TRANSCRIPTION ENCOUNTER (OUTPATIENT)
Age: 37
End: 2022-10-24

## 2022-10-25 ENCOUNTER — TRANSCRIPTION ENCOUNTER (OUTPATIENT)
Age: 37
End: 2022-10-25

## 2022-10-25 ENCOUNTER — NON-APPOINTMENT (OUTPATIENT)
Age: 37
End: 2022-10-25

## 2022-10-26 ENCOUNTER — NON-APPOINTMENT (OUTPATIENT)
Age: 37
End: 2022-10-26

## 2022-10-27 ENCOUNTER — NON-APPOINTMENT (OUTPATIENT)
Age: 37
End: 2022-10-27

## 2022-10-28 ENCOUNTER — APPOINTMENT (OUTPATIENT)
Dept: SPINE | Facility: CLINIC | Age: 37
End: 2022-10-28

## 2022-10-28 VITALS
SYSTOLIC BLOOD PRESSURE: 114 MMHG | HEART RATE: 96 BPM | RESPIRATION RATE: 16 BRPM | OXYGEN SATURATION: 96 % | DIASTOLIC BLOOD PRESSURE: 76 MMHG

## 2022-10-28 VITALS
OXYGEN SATURATION: 96 % | DIASTOLIC BLOOD PRESSURE: 90 MMHG | SYSTOLIC BLOOD PRESSURE: 130 MMHG | BODY MASS INDEX: 35.36 KG/M2 | HEIGHT: 66 IN | HEART RATE: 90 BPM | WEIGHT: 220 LBS

## 2022-10-28 PROCEDURE — 99024 POSTOP FOLLOW-UP VISIT: CPT

## 2022-10-28 RX ORDER — NAPROXEN 500 MG/1
500 TABLET ORAL
Qty: 14 | Refills: 0 | Status: COMPLETED | COMMUNITY
Start: 2022-07-10

## 2022-10-28 RX ORDER — ALPRAZOLAM 0.5 MG/1
0.5 TABLET ORAL
Qty: 20 | Refills: 0 | Status: ACTIVE | COMMUNITY
Start: 2022-07-07

## 2022-10-28 RX ORDER — PANTOPRAZOLE 40 MG/1
40 TABLET, DELAYED RELEASE ORAL
Qty: 5 | Refills: 0 | Status: COMPLETED | COMMUNITY
Start: 2022-09-15

## 2022-10-28 RX ORDER — DEXAMETHASONE 4 MG/1
4 TABLET ORAL
Qty: 8 | Refills: 0 | Status: COMPLETED | COMMUNITY
Start: 2022-09-15

## 2022-10-28 RX ORDER — METHYLPREDNISOLONE 4 MG/1
4 TABLET ORAL
Qty: 21 | Refills: 0 | Status: COMPLETED | COMMUNITY
Start: 2022-07-14

## 2022-10-28 RX ORDER — ACETAMINOPHEN AND CODEINE 300; 30 MG/1; MG/1
300-30 TABLET ORAL
Qty: 60 | Refills: 0 | Status: COMPLETED | COMMUNITY
Start: 2022-09-06

## 2022-10-28 RX ORDER — OXYCODONE 5 MG/1
5 TABLET ORAL
Qty: 9 | Refills: 0 | Status: COMPLETED | COMMUNITY
Start: 2022-09-07

## 2022-10-28 RX ORDER — CIPROFLOXACIN HYDROCHLORIDE 750 MG/1
750 TABLET, FILM COATED ORAL
Qty: 48 | Refills: 0 | Status: COMPLETED | COMMUNITY
Start: 2022-09-23

## 2022-10-28 RX ORDER — OXYCODONE HYDROCHLORIDE 15 MG/1
15 TABLET ORAL
Qty: 28 | Refills: 0 | Status: COMPLETED | COMMUNITY
Start: 2022-10-18

## 2022-10-28 RX ORDER — KETOROLAC TROMETHAMINE 10 MG/1
10 TABLET, FILM COATED ORAL
Qty: 20 | Refills: 0 | Status: COMPLETED | COMMUNITY
Start: 2022-09-23

## 2022-10-28 RX ORDER — METHOCARBAMOL 500 MG/1
500 TABLET, FILM COATED ORAL
Qty: 30 | Refills: 0 | Status: COMPLETED | COMMUNITY
Start: 2022-09-15

## 2022-10-28 RX ORDER — DIAZEPAM 5 MG/1
5 TABLET ORAL
Qty: 10 | Refills: 0 | Status: COMPLETED | COMMUNITY
Start: 2022-09-23

## 2022-10-28 RX ORDER — BENZONATATE 200 MG/1
200 CAPSULE ORAL
Qty: 15 | Refills: 0 | Status: COMPLETED | COMMUNITY
Start: 2022-06-17

## 2022-10-28 RX ORDER — MELOXICAM 15 MG/1
15 TABLET ORAL
Qty: 30 | Refills: 0 | Status: COMPLETED | COMMUNITY
Start: 2022-07-07

## 2022-10-28 RX ORDER — TRAMADOL HYDROCHLORIDE 50 MG/1
50 TABLET, COATED ORAL
Qty: 20 | Refills: 0 | Status: COMPLETED | COMMUNITY
Start: 2022-07-14

## 2022-10-28 RX ORDER — CELECOXIB 100 MG/1
100 CAPSULE ORAL
Qty: 60 | Refills: 0 | Status: DISCONTINUED | COMMUNITY
Start: 2022-07-18

## 2022-10-31 ENCOUNTER — APPOINTMENT (OUTPATIENT)
Dept: GASTROENTEROLOGY | Facility: CLINIC | Age: 37
End: 2022-10-31

## 2022-10-31 VITALS
SYSTOLIC BLOOD PRESSURE: 126 MMHG | BODY MASS INDEX: 36 KG/M2 | TEMPERATURE: 97 F | RESPIRATION RATE: 12 BRPM | HEART RATE: 130 BPM | HEIGHT: 66 IN | OXYGEN SATURATION: 97 % | DIASTOLIC BLOOD PRESSURE: 88 MMHG | WEIGHT: 224 LBS

## 2022-10-31 DIAGNOSIS — K59.09 OTHER CONSTIPATION: ICD-10-CM

## 2022-10-31 DIAGNOSIS — R73.03 PREDIABETES.: ICD-10-CM

## 2022-10-31 DIAGNOSIS — E66.9 OBESITY, UNSPECIFIED: ICD-10-CM

## 2022-10-31 DIAGNOSIS — K64.9 UNSPECIFIED HEMORRHOIDS: ICD-10-CM

## 2022-10-31 PROCEDURE — 99214 OFFICE O/P EST MOD 30 MIN: CPT

## 2022-10-31 NOTE — HISTORY OF PRESENT ILLNESS
[Heartburn] : denies heartburn [Nausea] : denies nausea [Vomiting] : denies vomiting [Diarrhea] : denies diarrhea [Constipation] : stable constipation [Yellow Skin Or Eyes (Jaundice)] : denies jaundice [Abdominal Pain] : denies abdominal pain [Abdominal Swelling] : denies abdominal swelling [FreeTextEntry1] : 35 yo female, referred by Dr. Fowler for evaluation of constipation.Moves bowels 2-3 days a week.  Uses suppositories to obtain a bm, uses occasional "smooth move."  Uses occ OTC laxatives. Has no urge to move bowels.  There is no rectal bleeding or family history colorectal cancer.  She has occasional hemorrhoidal irritation. She had some improvement with linzess 72 mg day, but not complete. She still uses occasional suppositories. She is getting  in a few months and is starting a low calorie diet.\par \par Returns today in followup; Had L5/S1 laminectomy/discectomy. Had pos op infection. Seen by NSGY today.  He states she still has some perianal numbness, hemorrhoidal discomfort, vaginal numbness and bilateral lateral tingling.  She is able to walk.  She is using over-the-counter senna preparation with some help but she did have cramping.  She had stopped her Linzess.  She required an enema in the hospital to move her bowels. [Wt Gain ___ Lbs] : no recent weight gain [Wt Loss ___ Lbs] : no recent weight loss [GERD] : no gastroesophageal reflux disease [Hiatus Hernia] : no hiatus hernia [Peptic Ulcer Disease] : no peptic ulcer disease [Pancreatitis] : no pancreatitis [Cholelithiasis] : no cholelithiasis [Kidney Stone] : no kidney stone [Inflammatory Bowel Disease] : no inflammatory bowel disease [Irritable Bowel Syndrome] : no irritable bowel syndrome [Diverticulitis] : no diverticulitis [Alcohol Abuse] : no alcohol abuse [Malignancy] : no malignancy [Abdominal Surgery] : no abdominal surgery [Appendectomy] : no appendectomy [Cholecystectomy] : no cholecystectomy [de-identified] : 37 yo female, referred by Dr. Fowler for evaluation of constipation.Moves bowels 2-3 days a week.  Uses suppositories to obtain a bm, uses occasional "smooth move."  Uses occ OTC laxatives. Has no urge to move bowels.  There is no rectal bleeding or family history colorectal cancer.  She has occasional hemorrhoidal irritation. She had some improvement with linzess 72 mg day, but not complete. She still uses occasional suppositories. She is getting  in a few months and is starting a low calorie diet.

## 2022-10-31 NOTE — ASSESSMENT
[FreeTextEntry1] : he MRI remains stable. There is abnormal marrow T1 hypointensity/STIR hyperintensity involving the endplates at the L5-S1 level as well as disc hyperintensity on the T2/STIR images likely representing discitis/osteomyelitis. There is a 3.5 x 1.5 x 4 cm fluid collection in the subcutaneous tissues posterior at the L5 level. Additional small fluid collection at the laminectomy defect. There are no disc herniations, stenosis or foraminal narrowing at T12-L1, L1-L2, L2-L3, or L3-L4. At L4-L5 there is degenerative disc change without dis herniation, stenosis or foraminal narrowing. At L5-S1 there is enhancing soft tissue in the anterior epidural space with mild thecal sac compression.\par Three of the sutures were removed.  The others were left in place for the plastic surgeon, Dr. Carroll to remove on Tuesday, as they were very tight to the skin.  The incision was cleaned and gauze was applied which the patient may remove at home tonight.  She may continue to shower, use soap and water and rinse and pat dry gently and leave open to air.  The patient is to finish physical therapy at home.  She was provided with a referral for out patient physical therapy to reduce postoperative back pain and stiffness and for gentle stretching and gait strengthening.  \par It was recommended that the patient have bilateral duplex studies of her lower extremities to rule out DVTs.  She will be called with the results.  \par The patient is to return to the office in 2 weeks for evaluation.  She is to call the office with any questions.

## 2022-10-31 NOTE — ASSESSMENT
[FreeTextEntry1] : 37 yo female, referred by Dr. Fowler for evaluation of constipation.Moves bowels 2-3 days a week.  Uses suppositories to obtain a bm, uses occasional "smooth move."  Uses occ OTC laxatives. Has no urge to move bowels.  There is no rectal bleeding or family history colorectal cancer.  She has occasional hemorrhoidal irritation. She had some improvement with linzess 72 mg day, but not complete. She still uses occasional suppositories. She is getting  in a few months and is starting a low calorie diet.\par \par Returns today in followup; Had L5/S1 laminectomy/discectomy. Had pos op infection. Seen by NSGY today.  He states she still has some perianal numbness, hemorrhoidal discomfort, vaginal numbness and bilateral lateral tingling.  She is able to walk.  She is using over-the-counter senna preparation with some help but she did have cramping.  She had stopped her Linzess.  She required an enema in the hospital to move her bowels.\par \par PLAN:\par 1. resume senna bid\par 2. Linzess 145- 290 mcg daily as d/w pt\par 3. Increase water intake\par 4. Ambulation as able to\par 5. PRN office followup and elective colonoscop when overral condition improved.

## 2022-10-31 NOTE — DATA REVIEWED
[de-identified] : Lumbar with and without contrast done at [facility] done on 10/25/2022.

## 2022-10-31 NOTE — REVIEW OF SYSTEMS
[As Noted in HPI] : as noted in HPI [Constipation] : constipation [Difficulty Walking] : difficulty walking [Negative] : Heme/Lymph

## 2022-10-31 NOTE — PHYSICAL EXAM
[General Appearance - Alert] : alert [General Appearance - In No Acute Distress] : in no acute distress [PERRL With Normal Accommodation] : pupils were equal in size, round, and reactive to light [Extraocular Movements] : extraocular movements were intact [Outer Ear] : the ears and nose were normal in appearance [Neck Appearance] : the appearance of the neck was normal [Neck Cervical Mass (___cm)] : no neck mass was observed [Jugular Venous Distention Increased] : there was no jugular-venous distention [Thyroid Diffuse Enlargement] : the thyroid was not enlarged [Thyroid Nodule] : there were no palpable thyroid nodules [Heart Sounds] : normal S1 and S2 [Heart Sounds Gallop] : no gallops [Heart Sounds Pericardial Friction Rub] : no pericardial rub [Full Pulse] : the pedal pulses are present [Edema] : there was no peripheral edema [Abdomen Mass (___ Cm)] : no abdominal mass palpated [Cervical Lymph Nodes Enlarged Posterior Bilaterally] : posterior cervical [Cervical Lymph Nodes Enlarged Anterior Bilaterally] : anterior cervical [Supraclavicular Lymph Nodes Enlarged Bilaterally] : supraclavicular [Axillary Lymph Nodes Enlarged Bilaterally] : axillary [Femoral Lymph Nodes Enlarged Bilaterally] : femoral [Inguinal Lymph Nodes Enlarged Bilaterally] : inguinal [No CVA Tenderness] : no ~M costovertebral angle tenderness [No Spinal Tenderness] : no spinal tenderness [Abnormal Walk] : normal gait [Nail Clubbing] : no clubbing  or cyanosis of the fingernails [Musculoskeletal - Swelling] : no joint swelling seen [Motor Tone] : muscle strength and tone were normal [Skin Color & Pigmentation] : normal skin color and pigmentation [Skin Turgor] : normal skin turgor [Deep Tendon Reflexes (DTR)] : deep tendon reflexes were 2+ and symmetric [Sensation] : the sensory exam was normal to light touch and pinprick [No Focal Deficits] : no focal deficits [Impaired Insight] : insight and judgment were intact [Affect] : the affect was normal [Alert] : alert [Normal Voice/Communication] : normal voice/communication [Healthy Appearing] : healthy appearing [No Acute Distress] : no acute distress [Sclera] : the sclera and conjunctiva were normal [Hearing Threshold Finger Rub Not [place]] : hearing was normal [Normal Lips/Gums] : the lips and gums were normal [Oropharynx] : the oropharynx was normal [Normal Appearance] : the appearance of the neck was normal [No Neck Mass] : no neck mass was observed [No Respiratory Distress] : no respiratory distress [No Acc Muscle Use] : no accessory muscle use [Respiration, Rhythm And Depth] : normal respiratory rhythm and effort [Auscultation Breath Sounds / Voice Sounds] : lungs were clear to auscultation bilaterally [Heart Rate And Rhythm] : heart rate was normal and rhythm regular [Normal S1, S2] : normal S1 and S2 [Murmurs] : no murmurs [Bowel Sounds] : normal bowel sounds [Abdomen Tenderness] : non-tender [No Masses] : no abdominal mass palpated [Abdomen Soft] : soft [] : no hepatosplenomegaly [Oriented To Time, Place, And Person] : oriented to person, place, and time

## 2022-10-31 NOTE — REASON FOR VISIT
[Family Member] : family member [de-identified] : 1. L5-S1 complete laminectomy with left medial facetectomy and foraminotomy. 2. Discectomy.  Followed by 1.  Exploration of L5-S1 laminectomy site.  2. Debridement of epidural infection and diskitis and cultures.  Surgeon:  Dr. Austin Patricio [de-identified] : 09/13/2022 and 10/14/2022 [de-identified] : 31 and 14 [de-identified] : 4 and 2 [de-identified] : The patient denied having any pain radiating into her legs.  She also stated the the rectal and perineal numbness is much improved.  The patient remains with some swelling in her left ankle and foot and slight swelling in her right ankle.  She stated that she does not feel pain in her calf but does feel some tightness.  She has been doing physical therapy at home but would like to start outpatient therapy.  The patient spoke to Dr. Yo Nam yesterday.  The Vancomycin was discontinued and she remains on Cefepime via a PICC line.  The patient had an MRI of the lumbar spine with and without contrast which was stable compared to her previous MRI done at [facility].  Dr. Patricio called the patient with the results yesterday.  Ms. Waller had a telehealth appointment with her primary provider this week, who advised her to see her GI specialist to evaluate her difficulty with constipation.  The patient is here for removal of sutures.

## 2022-10-31 NOTE — PHYSICAL EXAM
[General Appearance - Alert] : alert [General Appearance - In No Acute Distress] : in no acute distress [General Appearance - Well Nourished] : well nourished [General Appearance - Well Developed] : well developed [General Appearance - Well-Appearing] : healthy appearing [] : normal voice and communication [Longitudinal] : longitudinal [Clean] : clean [Dry] : dry [Healing Well] : healing well [Intact] : intact [Sutures Intact] : closed with intact sutures [No Drainage] : without drainage [Normal Skin] : normal [Normal Skin Turgor] : skin turgor was normal [Oriented To Time, Place, And Person] : oriented to person, place, and time [Impaired Insight] : insight and judgment were intact [Affect] : the affect was normal [Mood] : the mood was normal [Memory Recent] : recent memory was not impaired [Memory Remote] : remote memory was not impaired [Person] : oriented to person [Place] : oriented to place [Time] : oriented to time [Short Term Intact] : short term memory intact [Remote Intact] : remote memory intact [Span Intact] : the attention span was normal [Concentration Intact] : normal concentrating ability [Fluency] : fluency intact [Comprehension] : comprehension intact [Current Events] : adequate knowledge of current events [Past History] : adequate knowledge of personal past history [Vocabulary] : adequate range of vocabulary [Cranial Nerves Optic (II)] : visual acuity intact bilaterally,  pupils equal round and reactive to light [Cranial Nerves Oculomotor (III)] : extraocular motion intact [Cranial Nerves Trigeminal (V)] : facial sensation intact symmetrically [Cranial Nerves Facial (VII)] : face symmetrical [Cranial Nerves Vestibulocochlear (VIII)] : hearing was intact bilaterally [Cranial Nerves Glossopharyngeal (IX)] : tongue and palate midline [Cranial Nerves Accessory (XI - Cranial And Spinal)] : head turning and shoulder shrug symmetric [Cranial Nerves Hypoglossal (XII)] : there was no tongue deviation with protrusion [Motor Tone] : muscle tone was normal in all four extremities [Motor Strength] : muscle strength was normal in all four extremities [No Muscle Atrophy] : normal bulk in all four extremities [4] : S1 ankle flexors 4/5 [5] : S1 flexor hallucis longus 5/5 [Sensation Tactile Decrease] : light touch was intact [Abnormal Walk] : normal gait [Balance] : balance was intact [2+] : Patella left 2+ [0] : Ankle jerk left 0 [Erythema] : not erythematous [Warm] : not warm [FreeTextEntry1] : lumbar [Past-pointing] : there was no past-pointing [Tremor] : no tremor present [___] : absent on the right [___] : absent on the left [Arriaga] : Arriaga's sign was not demonstrated

## 2022-11-01 ENCOUNTER — APPOINTMENT (OUTPATIENT)
Dept: INFECTIOUS DISEASE | Facility: CLINIC | Age: 37
End: 2022-11-01

## 2022-11-01 ENCOUNTER — APPOINTMENT (OUTPATIENT)
Dept: PLASTIC SURGERY | Facility: CLINIC | Age: 37
End: 2022-11-01

## 2022-11-01 VITALS
OXYGEN SATURATION: 98 % | HEART RATE: 96 BPM | DIASTOLIC BLOOD PRESSURE: 89 MMHG | SYSTOLIC BLOOD PRESSURE: 135 MMHG | TEMPERATURE: 97.6 F | BODY MASS INDEX: 35.36 KG/M2 | WEIGHT: 220 LBS | HEIGHT: 66 IN

## 2022-11-01 VITALS
HEART RATE: 122 BPM | BODY MASS INDEX: 36 KG/M2 | SYSTOLIC BLOOD PRESSURE: 134 MMHG | TEMPERATURE: 97.5 F | DIASTOLIC BLOOD PRESSURE: 82 MMHG | HEIGHT: 66 IN | WEIGHT: 224 LBS | OXYGEN SATURATION: 97 %

## 2022-11-01 DIAGNOSIS — Z98.890 OTHER SPECIFIED POSTPROCEDURAL STATES: ICD-10-CM

## 2022-11-01 PROCEDURE — 99213 OFFICE O/P EST LOW 20 MIN: CPT

## 2022-11-01 PROCEDURE — 99024 POSTOP FOLLOW-UP VISIT: CPT

## 2022-11-02 ENCOUNTER — TRANSCRIPTION ENCOUNTER (OUTPATIENT)
Age: 37
End: 2022-11-02

## 2022-11-02 NOTE — HISTORY OF PRESENT ILLNESS
Unable to print After Visit Summary for this encountered has been printed and given to patient. I have reviewed discharge instructions with the patient. The patient verbalized understanding. Guidance given regarding new medications this visit, including reason for taking this medicine, common side effects, and pharmacy medication was sent to if not printed. Pharmacy Assistance Program Application completed during this encounter. Medication assistance needed for Ventolin HFA 2 puffs q4hrs PRN, Symbicort 80-4.5 mcg 2 puffs BID, Levemir 20 units BID, Humalog 5 units BID, Caduet 10/80 mg daily, Benicar 40 mg daily. [FreeTextEntry1] : no fever, chills\par constipation improved with senna and linziess\par doing well with Cefepime 2 gm every 8 hours vis PICC line\par ambulates with cane\par starting out - pt physical therapy  has LLE numbness\par saw plastics earlier today - sutures removed - site looks good\par \par Hospitalized [facility] 9/18 --> 9/23/22\par \par 37F with recent L5-S1 laminectomy (9/13/22), readmitted 9/18/22 with severe back pain with fever, leukocytosis\par MRI demonstrates seroma, inflammatory post operative changes\par UA negative\par 9/19 ESR = 72\par Patient states she showered at home after laminectomy applying plastic over incision.\par 9/18 +BC Pseudo aeruginosa (Resist Imipenem, Inter Merop - Susc to cipro and all others\par 9/21 BC x2 NGTD\par Leukocytosis, fever promptly resolved\par Plastics consult done\par potential adverse events discussed - adjunctive prebiotics/probiotics discussed\par \par \par \par 9/30 wbc = 10.65; Hgb 11.8; Creat = 0.79; ESR/CRP = 44/19\par \par Rehospitalized 10/13 --> 10/ 18/22\par 37F with hx lumbar radiculopathy s/p L5-S1 laminectomy and discectomy on 9/13/22. Readmitted 9/18/22-9/23/22 for increasing back pain. Found to have pseudomonas bacteremia (carbapenem resistant) MRI with post surgical seroma. She was treated with IV cefepime and discharged on oral ciprofloxacin which she was to complete 10/17. Returned again 10/13 as she lost strength to her legs. Here, MRI noted epidural collection. \par Patient admitted and underwent an MRI revealing findings consistent with osteomyelitis and discitis at L5-S1 with a small ventral epidural abscess. She was taken to the OR \par 10/14/22 S/P lumbar wound revision and washout of epidural collection, exploration of previous L5-S1 discectomy, skin debridement. She had routine post operative care. She was seen and followed in consult by medicine. ID was consulted and OR cultures eventually grew out Pseudomonas and treated with vanco/cefepime. 10/17/22 s/p Right PICC placed at bedside.\par 10/13/22 taken to the OR and underwent washout of epidural collection and lumbar wound revision. Drained cloudy fluid, not cornelio pus. Tissue cultures collected.+Cx Ps aerug Susc Cipro, Cefepime (similar susceptibilities to 9/18/22 blood culture Ps aerug isolate\par \par 10/18 WBC = 11.05\par 10/13 ESR/CRP = 59/33\par \par 10/13/22 oeprative culture growing same Pseudomonas aerug (in brth media only) previously cultured from blood on 9/18/22\par \par She no longer requires vanco\par doing iv abx regularly - notes dry mouth\par \par STOPPED VANCO  10/27/22\par Continue CEFEPIME 2 gm every 8 hours 10/13 --> through 11/24/22\par \par 11/1/22 Labs:   WBC/Creat= 7.6/ 0.63   ESR/CRP = 31/4\par \par ([facility] )

## 2022-11-02 NOTE — PHYSICAL EXAM
[General Appearance - Alert] : alert [General Appearance - In No Acute Distress] : in no acute distress [Sclera] : the sclera and conjunctiva were normal [PERRL With Normal Accommodation] : pupils were equal in size, round, reactive to light [Extraocular Movements] : extraocular movements were intact [Outer Ear] : the ears and nose were normal in appearance [Neck Appearance] : the appearance of the neck was normal [Neck Cervical Mass (___cm)] : no neck mass was observed [Jugular Venous Distention Increased] : there was no jugular-venous distention [Thyroid Diffuse Enlargement] : the thyroid was not enlarged [Edema] : there was no peripheral edema [] : no rash [Skin Color & Pigmentation] : normal skin color and pigmentation [Oriented To Time, Place, And Person] : oriented to person, place, and time [Affect] : the affect was normal [FreeTextEntry1] : LUR PICC site clear

## 2022-11-04 ENCOUNTER — NON-APPOINTMENT (OUTPATIENT)
Age: 37
End: 2022-11-04

## 2022-11-07 NOTE — ED ADULT NURSE NOTE - NS ED NURSE RECORD ANOTHER HT AND WT
Anesthesia Evaluation                  Airway   Mallampati: I  TM distance: >3 FB  Neck ROM: full  No difficulty expected  Dental      Pulmonary    (+) asthma,sleep apnea,   Cardiovascular     ECG reviewed    (+) hypertension, valvular problems/murmurs, hyperlipidemia,     ROS comment: S/p tavr    Neuro/Psych  GI/Hepatic/Renal/Endo    (+)   renal disease,     Musculoskeletal     Abdominal    Substance History      OB/GYN          Other   arthritis,                      Anesthesia Plan    ASA 3     general     (Possible taps)  intravenous induction     Anesthetic plan, risks, benefits, and alternatives have been provided, discussed and informed consent has been obtained with: patient.    Plan discussed with CRNA.        CODE STATUS:        Yes

## 2022-11-09 ENCOUNTER — NON-APPOINTMENT (OUTPATIENT)
Age: 37
End: 2022-11-09

## 2022-11-11 DIAGNOSIS — M79.671 PAIN IN RIGHT FOOT: ICD-10-CM

## 2022-11-11 RX ORDER — DICLOFENAC SODIUM 75 MG/1
75 TABLET, DELAYED RELEASE ORAL
Qty: 60 | Refills: 1 | Status: DISCONTINUED | COMMUNITY
Start: 2022-09-07 | End: 2022-11-11

## 2022-11-12 LAB

## 2022-11-15 ENCOUNTER — TRANSCRIPTION ENCOUNTER (OUTPATIENT)
Age: 37
End: 2022-11-15

## 2022-11-16 ENCOUNTER — APPOINTMENT (OUTPATIENT)
Dept: SPINE | Facility: CLINIC | Age: 37
End: 2022-11-16

## 2022-11-16 VITALS
OXYGEN SATURATION: 98 % | DIASTOLIC BLOOD PRESSURE: 93 MMHG | HEART RATE: 104 BPM | HEIGHT: 66 IN | SYSTOLIC BLOOD PRESSURE: 125 MMHG | WEIGHT: 220 LBS | BODY MASS INDEX: 35.36 KG/M2

## 2022-11-16 DIAGNOSIS — M54.2 CERVICALGIA: ICD-10-CM

## 2022-11-16 DIAGNOSIS — T81.49XA INFECTION FOLLOWING A PROCEDURE, OTHER SURGICAL SITE, INITIAL ENCOUNTER: ICD-10-CM

## 2022-11-16 PROCEDURE — 99024 POSTOP FOLLOW-UP VISIT: CPT

## 2022-11-17 ENCOUNTER — NON-APPOINTMENT (OUTPATIENT)
Age: 37
End: 2022-11-17

## 2022-11-21 ENCOUNTER — NON-APPOINTMENT (OUTPATIENT)
Age: 37
End: 2022-11-21

## 2022-11-22 ENCOUNTER — NON-APPOINTMENT (OUTPATIENT)
Age: 37
End: 2022-11-22

## 2022-11-22 ENCOUNTER — TRANSCRIPTION ENCOUNTER (OUTPATIENT)
Age: 37
End: 2022-11-22

## 2022-11-23 ENCOUNTER — TRANSCRIPTION ENCOUNTER (OUTPATIENT)
Age: 37
End: 2022-11-23

## 2022-11-23 ENCOUNTER — NON-APPOINTMENT (OUTPATIENT)
Age: 37
End: 2022-11-23

## 2022-11-28 ENCOUNTER — NON-APPOINTMENT (OUTPATIENT)
Age: 37
End: 2022-11-28

## 2022-12-02 ENCOUNTER — NON-APPOINTMENT (OUTPATIENT)
Age: 37
End: 2022-12-02

## 2022-12-05 ENCOUNTER — TRANSCRIPTION ENCOUNTER (OUTPATIENT)
Age: 37
End: 2022-12-05

## 2022-12-07 ENCOUNTER — APPOINTMENT (OUTPATIENT)
Dept: SPINE | Facility: CLINIC | Age: 37
End: 2022-12-07

## 2022-12-07 ENCOUNTER — NON-APPOINTMENT (OUTPATIENT)
Age: 37
End: 2022-12-07

## 2022-12-10 PROBLEM — Z98.890 S/P LUMBAR LAMINECTOMY: Status: ACTIVE | Noted: 2022-10-07

## 2022-12-10 NOTE — REASON FOR VISIT
[Post Op: _________] : a [unfilled] post op visit [Parent] : parent [FreeTextEntry1] : S/p back closure DOS: 09/13/22. Patient denies complaints of pain, bleeding and drainage.

## 2022-12-13 ENCOUNTER — NON-APPOINTMENT (OUTPATIENT)
Age: 37
End: 2022-12-13

## 2022-12-13 ENCOUNTER — TRANSCRIPTION ENCOUNTER (OUTPATIENT)
Age: 37
End: 2022-12-13

## 2022-12-13 DIAGNOSIS — U07.1 COVID-19: ICD-10-CM

## 2022-12-14 ENCOUNTER — APPOINTMENT (OUTPATIENT)
Dept: SPINE | Facility: CLINIC | Age: 37
End: 2022-12-14

## 2022-12-14 ENCOUNTER — NON-APPOINTMENT (OUTPATIENT)
Age: 37
End: 2022-12-14

## 2022-12-16 ENCOUNTER — NON-APPOINTMENT (OUTPATIENT)
Age: 37
End: 2022-12-16

## 2022-12-28 ENCOUNTER — APPOINTMENT (OUTPATIENT)
Dept: SPINE | Facility: CLINIC | Age: 37
End: 2022-12-28

## 2022-12-28 VITALS
OXYGEN SATURATION: 98 % | DIASTOLIC BLOOD PRESSURE: 90 MMHG | SYSTOLIC BLOOD PRESSURE: 143 MMHG | BODY MASS INDEX: 35.36 KG/M2 | HEART RATE: 85 BPM | HEIGHT: 66 IN | WEIGHT: 220 LBS

## 2022-12-28 DIAGNOSIS — Z98.890 OTHER SPECIFIED POSTPROCEDURAL STATES: ICD-10-CM

## 2022-12-28 PROCEDURE — 99024 POSTOP FOLLOW-UP VISIT: CPT

## 2022-12-28 RX ORDER — HYDROCORTISONE 25 MG/G
2.5 CREAM TOPICAL
Qty: 1 | Refills: 3 | Status: DISCONTINUED | COMMUNITY
Start: 2022-09-16 | End: 2022-12-28

## 2022-12-28 RX ORDER — CEFEPIME HYDROCHLORIDE 2 G/1
2 INJECTION, POWDER, FOR SOLUTION INTRAMUSCULAR; INTRAVENOUS
Refills: 0 | Status: DISCONTINUED | COMMUNITY
End: 2022-12-28

## 2022-12-28 RX ORDER — GABAPENTIN 300 MG/1
300 CAPSULE ORAL TWICE DAILY
Qty: 60 | Refills: 1 | Status: DISCONTINUED | COMMUNITY
Start: 2022-10-18 | End: 2022-12-28

## 2022-12-28 RX ORDER — OXYCODONE 10 MG/1
10 TABLET ORAL EVERY 8 HOURS
Qty: 15 | Refills: 0 | Status: DISCONTINUED | COMMUNITY
Start: 2022-10-10 | End: 2022-12-28

## 2022-12-28 RX ORDER — ONDANSETRON 4 MG/1
4 TABLET, ORALLY DISINTEGRATING ORAL EVERY 8 HOURS
Qty: 21 | Refills: 0 | Status: DISCONTINUED | COMMUNITY
Start: 2022-09-08 | End: 2022-12-28

## 2022-12-28 RX ORDER — HYDROCORTISONE ACETATE 25 MG/1
25 SUPPOSITORY RECTAL
Qty: 10 | Refills: 0 | Status: DISCONTINUED | COMMUNITY
Start: 2022-09-15 | End: 2022-12-28

## 2022-12-28 RX ORDER — DICLOFENAC SODIUM 1% 10 MG/G
1 GEL TOPICAL
Qty: 1 | Refills: 0 | Status: DISCONTINUED | COMMUNITY
Start: 2022-11-11 | End: 2022-12-28

## 2022-12-28 RX ORDER — POLYETHYLENE GLYCOL-3350 AND ELECTROLYTES 236; 6.74; 5.86; 2.97; 22.74 G/274.31G; G/274.31G; G/274.31G; G/274.31G; G/274.31G
236 POWDER, FOR SOLUTION ORAL
Qty: 1 | Refills: 0 | Status: DISCONTINUED | COMMUNITY
Start: 2022-10-07 | End: 2022-12-28

## 2022-12-28 RX ORDER — OXYCODONE HYDROCHLORIDE 5 MG/1
5 CAPSULE ORAL
Refills: 0 | Status: DISCONTINUED | COMMUNITY
End: 2022-12-28

## 2022-12-28 RX ORDER — CYCLOBENZAPRINE HYDROCHLORIDE 7.5 MG/1
TABLET, FILM COATED ORAL
Refills: 0 | Status: DISCONTINUED | COMMUNITY
End: 2022-12-28

## 2022-12-28 RX ORDER — HYDROCORTISONE 25 MG/G
2.5 CREAM TOPICAL
Qty: 1 | Refills: 2 | Status: DISCONTINUED | COMMUNITY
Start: 2022-09-16 | End: 2022-12-28

## 2023-01-03 ENCOUNTER — NON-APPOINTMENT (OUTPATIENT)
Age: 38
End: 2023-01-03

## 2023-01-04 ENCOUNTER — TRANSCRIPTION ENCOUNTER (OUTPATIENT)
Age: 38
End: 2023-01-04

## 2023-01-09 ENCOUNTER — TRANSCRIPTION ENCOUNTER (OUTPATIENT)
Age: 38
End: 2023-01-09

## 2023-01-10 ENCOUNTER — TRANSCRIPTION ENCOUNTER (OUTPATIENT)
Age: 38
End: 2023-01-10

## 2023-01-17 NOTE — REASON FOR VISIT
[Post Op: _________] : a [unfilled] post op visit [Parent] : parent [FreeTextEntry1] : s/p lumbar laminectomy with discectomy and closure DOS: 9/13/2022

## 2023-02-06 NOTE — ED ADULT NURSE REASSESSMENT NOTE - NS ED NURSE REASSESS COMMENT FT1
MRI consent sheet faxed. Face, Ears And  Scalp Incubation Time: 1 Hour Photosensitizer: Levulan Face And Scalp Incubation Time: 1 Hour for the face and 2 Hours for the scalp Feet Incubation Time: 2 Hours Face Incubation Time: 1 Hour & 30 minutes Detail Level: Simple Location: Face Consent: The procedure and risks were reviewed with the patient including but not limited to: burning, pigmentary changes, pain, blistering, scabbing, redness, and the possibility of needing numerous treatments. Strict photoprotection after the procedure was also discussed. Occlusion: No Frequency Of Pdt: Single Treatment Pdt Type: CHARLENE-U

## 2023-02-08 ENCOUNTER — APPOINTMENT (OUTPATIENT)
Dept: SPINE | Facility: CLINIC | Age: 38
End: 2023-02-08
Payer: COMMERCIAL

## 2023-02-08 VITALS — DIASTOLIC BLOOD PRESSURE: 75 MMHG | SYSTOLIC BLOOD PRESSURE: 108 MMHG

## 2023-02-08 VITALS — BODY MASS INDEX: 33.57 KG/M2 | WEIGHT: 208 LBS

## 2023-02-08 VITALS
OXYGEN SATURATION: 97 % | BODY MASS INDEX: 35.36 KG/M2 | DIASTOLIC BLOOD PRESSURE: 104 MMHG | HEART RATE: 86 BPM | SYSTOLIC BLOOD PRESSURE: 151 MMHG | HEIGHT: 66 IN | WEIGHT: 220 LBS

## 2023-02-08 DIAGNOSIS — M51.26 OTHER INTERVERTEBRAL DISC DISPLACEMENT, LUMBAR REGION: ICD-10-CM

## 2023-02-08 PROCEDURE — 99212 OFFICE O/P EST SF 10 MIN: CPT

## 2023-02-09 ENCOUNTER — TRANSCRIPTION ENCOUNTER (OUTPATIENT)
Age: 38
End: 2023-02-09

## 2023-02-09 ENCOUNTER — NON-APPOINTMENT (OUTPATIENT)
Age: 38
End: 2023-02-09

## 2023-05-10 ENCOUNTER — APPOINTMENT (OUTPATIENT)
Dept: SPINE | Facility: CLINIC | Age: 38
End: 2023-05-10
Payer: COMMERCIAL

## 2023-05-10 VITALS
BODY MASS INDEX: 33.43 KG/M2 | HEIGHT: 66 IN | HEART RATE: 78 BPM | SYSTOLIC BLOOD PRESSURE: 117 MMHG | DIASTOLIC BLOOD PRESSURE: 84 MMHG | OXYGEN SATURATION: 100 % | WEIGHT: 208 LBS

## 2023-05-10 DIAGNOSIS — M46.46 DISCITIS, UNSPECIFIED, LUMBAR REGION: ICD-10-CM

## 2023-05-10 DIAGNOSIS — M54.16 RADICULOPATHY, LUMBAR REGION: ICD-10-CM

## 2023-05-10 PROCEDURE — 99213 OFFICE O/P EST LOW 20 MIN: CPT

## 2023-05-10 RX ORDER — CYCLOBENZAPRINE HYDROCHLORIDE 10 MG/1
10 TABLET, FILM COATED ORAL 3 TIMES DAILY
Qty: 60 | Refills: 0 | Status: DISCONTINUED | COMMUNITY
Start: 2022-10-10 | End: 2023-05-10

## 2023-05-10 RX ORDER — PREGABALIN 50 MG/1
50 CAPSULE ORAL
Qty: 21 | Refills: 0 | Status: DISCONTINUED | COMMUNITY
Start: 2022-12-28 | End: 2023-05-10

## 2023-05-10 RX ORDER — LINACLOTIDE 72 UG/1
72 CAPSULE, GELATIN COATED ORAL
Qty: 30 | Refills: 2 | Status: DISCONTINUED | COMMUNITY
Start: 2022-09-16 | End: 2023-05-10

## 2023-05-10 RX ORDER — METHOCARBAMOL 750 MG/1
750 TABLET, FILM COATED ORAL
Qty: 30 | Refills: 0 | Status: DISCONTINUED | COMMUNITY
Start: 2022-10-18 | End: 2023-05-10

## 2023-05-10 RX ORDER — MELOXICAM 7.5 MG/1
7.5 TABLET ORAL
Qty: 60 | Refills: 0 | Status: DISCONTINUED | COMMUNITY
Start: 2022-12-27 | End: 2023-05-10

## 2023-05-15 NOTE — RESULTS/DATA
[FreeTextEntry1] : L5-S1 discectomy \par \par Improved appearance of previous inflammatory/infectious changes at the L5-S1 level including interval resolution of soft tissue fluid collections posteriorly. \par \par Apparent interval retraction of a 8mm T1 hypointense disc fragment or inflammatory mass at the S1 level on the left, based on noncontrast imaging

## 2023-05-15 NOTE — PHYSICAL EXAM
[General Appearance - Alert] : alert [General Appearance - In No Acute Distress] : in no acute distress [Oriented To Time, Place, And Person] : oriented to person, place, and time [Person] : oriented to person [Place] : oriented to place [Time] : oriented to time [Short Term Intact] : short term memory intact [Remote Intact] : remote memory intact [Registration Intact] : recent registration memory intact [Span Intact] : the attention span was normal [Concentration Intact] : normal concentrating ability [Visual Intact] : visual attention was ~T not ~L decreased [Fluency] : fluency intact [Comprehension] : comprehension intact [Reading] : reading intact [Current Events] : adequate knowledge of current events [Past History] : adequate knowledge of personal past history [Vocabulary] : adequate range of vocabulary [Motor Tone] : muscle tone was normal in all four extremities [Motor Strength] : muscle strength was normal in all four extremities [1+] : Triceps left 1+ [0] : Ankle jerk left 0 [I: Normal Smell] : smell intact bilaterally [Cranial Nerves Facial (VII)] : face symmetrical [Cranial Nerves Vestibulocochlear (VIII)] : hearing was intact bilaterally [Abnormal Walk] : normal gait [Balance] : balance was intact [No Visual Abnormalities] : no visible abnormailities [Normal] : normal [FreeTextEntry1] : lumbar spine  [___] : absent on the right [___] : absent on the left [Arriaga] : Arriaga's sign was not demonstrated

## 2023-05-15 NOTE — REASON FOR VISIT
[Follow-Up: _____] : a [unfilled] follow-up visit [Other: _____] : [unfilled] [FreeTextEntry1] : Today she reports right-sided low back pain, right heel pain, tightness in lower extremities, paresthesia in right calf and toes. Pain level is 9/10. She is taking Lyrica 150 TID which initially improvement of heel pain, but is no longer providing relief. The patient is being followed by pain specialist, Dr. Hari Walters. She is here today review a new MRI of lumbar spine \par

## 2023-05-15 NOTE — ASSESSMENT
[FreeTextEntry1] : 37 year old 8 months status post. s/p L5-S1 laminectomy and discectomy. 7 months s/p lumbar wound revision and washout of epidural. She was encouraged to continue physical therapy, do self directed exercises and continue to follow up with pain management Dr. Hari Walters. Follow up as needed

## 2023-05-15 NOTE — HISTORY OF PRESENT ILLNESS
[FreeTextEntry1] : CLAIRE NOONAN is a 37 year old lady who underwent an L5-S1 complete laminectomy with left facetectomy and foraminotomy and discectomy on 09/13/2022 followed by an exploration of the L5-S1 laminectomy site, debridement of an epidural infection and discitis and cultures on 10/14/2022.

## 2023-06-09 ENCOUNTER — TRANSCRIPTION ENCOUNTER (OUTPATIENT)
Age: 38
End: 2023-06-09

## 2023-06-29 ENCOUNTER — APPOINTMENT (OUTPATIENT)
Dept: GASTROENTEROLOGY | Facility: AMBULATORY SURGERY CENTER | Age: 38
End: 2023-06-29
Payer: COMMERCIAL

## 2023-06-29 PROCEDURE — 45378 DIAGNOSTIC COLONOSCOPY: CPT

## 2023-06-30 ENCOUNTER — TRANSCRIPTION ENCOUNTER (OUTPATIENT)
Age: 38
End: 2023-06-30

## 2023-07-18 RX ORDER — SODIUM PICOSULFATE, MAGNESIUM OXIDE, AND ANHYDROUS CITRIC ACID 10; 3.5; 12 MG/160ML; G/160ML; G/160ML
10-3.5-12 MG-GM LIQUID ORAL
Qty: 320 | Refills: 0 | Status: DISCONTINUED | COMMUNITY
Start: 2023-06-12 | End: 2023-07-18

## 2023-07-18 RX ORDER — SODIUM SULFATE, POTASSIUM SULFATE AND MAGNESIUM SULFATE 1.6; 3.13; 17.5 G/177ML; G/177ML; G/177ML
17.5-3.13-1.6 SOLUTION ORAL
Qty: 1 | Refills: 0 | Status: DISCONTINUED | COMMUNITY
Start: 2023-06-23 | End: 2023-07-18

## 2023-07-24 ENCOUNTER — TRANSCRIPTION ENCOUNTER (OUTPATIENT)
Age: 38
End: 2023-07-24

## 2023-08-23 ENCOUNTER — TRANSCRIPTION ENCOUNTER (OUTPATIENT)
Age: 38
End: 2023-08-23

## 2023-11-14 RX ORDER — LINACLOTIDE 145 UG/1
145 CAPSULE, GELATIN COATED ORAL
Qty: 90 | Refills: 3 | Status: ACTIVE | COMMUNITY
Start: 2023-01-09 | End: 1900-01-01

## 2023-11-16 ENCOUNTER — TRANSCRIPTION ENCOUNTER (OUTPATIENT)
Age: 38
End: 2023-11-16

## 2023-11-16 RX ORDER — LINACLOTIDE 72 UG/1
72 CAPSULE, GELATIN COATED ORAL
Qty: 30 | Refills: 2 | Status: ACTIVE | COMMUNITY
Start: 2023-11-16 | End: 1900-01-01

## 2023-11-20 NOTE — DISCHARGE NOTE PROVIDER - NSDCFUADDINST_GEN_ALL_CORE_FT
1. End stage renal disease on dialysis [facility])  Assessment & Plan:  Lab Results   Component Value Date    EGFR 6 09/01/2023    EGFR 3 08/30/2023    EGFR 3 08/28/2023    CREATININE 9.46 (H) 09/01/2023    CREATININE 15.32 (H) 08/30/2023    CREATININE 14.98 (H) 08/28/2023     We will do the labs and he is likely a 82-year-old male presents with end-stage renal disease and vein mapping previously completed. He is right-handed and has appropriate left upper extremity cephalic vein for formation of a brachiocephalic fistula.     Orders:  -     Ambulatory Referral to Vascular Surgery  -     Case request operating room: CREATION FISTULA ARTERIOVENOUS (AV); Standing  -     Case request operating room: CREATION FISTULA ARTERIOVENOUS (AV)
Wound care Instructions: Dress wound with medihoney and aquacel. Change dressing every other day.  Please follow up with Plastics on Tuesday at clinic 9/27/22.

## 2023-12-04 ENCOUNTER — TRANSCRIPTION ENCOUNTER (OUTPATIENT)
Age: 38
End: 2023-12-04

## 2023-12-06 ENCOUNTER — TRANSCRIPTION ENCOUNTER (OUTPATIENT)
Age: 38
End: 2023-12-06

## 2023-12-07 ENCOUNTER — TRANSCRIPTION ENCOUNTER (OUTPATIENT)
Age: 38
End: 2023-12-07

## 2024-02-25 NOTE — PHYSICAL THERAPY INITIAL EVALUATION ADULT - WEIGHT-BEARING RESTRICTIONS: GAIT, REHAB EVAL
Carafate sent to patient in 2023 previously. Would he like a refill of the medicine? If so, then okay to send 40 tablets.  NBP  
[facility]  
full weight-bearing

## 2024-04-09 ENCOUNTER — TRANSCRIPTION ENCOUNTER (OUTPATIENT)
Age: 39
End: 2024-04-09

## 2024-05-24 NOTE — ASSESSMENT
[FreeTextEntry1] : Pseudomonas aeruginosa back wound infection\par tolerating abx\par appears well\par to complete 6 week course of Cefepime\par s/p 10/13/22 taken to the OR and underwent washout of epidural collection and lumbar wound revision\par after Cefepime 2 every 8 hours 9/19--> 9/23 until discharged\par  CIPRO 750 mg po twice daily 9/23 planned through 10/13\par Cefepime 10/13 --> through 11/24/22 Normal for race

## 2024-05-29 ENCOUNTER — NON-APPOINTMENT (OUTPATIENT)
Age: 39
End: 2024-05-29

## 2024-08-16 NOTE — PHYSICAL THERAPY INITIAL EVALUATION ADULT - RISK REDUCTION/PREVENTION, PT EVAL
mouth daily       No current facility-administered medications for this visit.        Objective   Vitals:   /82 (Site: Right Lower Arm, Position: Sitting, Cuff Size: Medium Adult)   Pulse 98   Temp 98.1 °F (36.7 °C) (Temporal)   Resp 18   Ht 1.575 m (5' 2\")   Wt 102.3 kg (225 lb 9.6 oz)   LMP 07/28/2019   SpO2 94%   BMI 41.26 kg/m²       Physical Exam:  CONSTITUTIONAL:  awake, alert, cooperative, no apparent distress, sitting in chair  EYES:  KALYN; extra ocular muscles intact;  LUNGS:  No increased work of breathing, good air exchange, clear to auscultation bilaterally, no crackles or wheezing  CARDIOVASCULAR:  Normal apical impulse, regular rate and rhythm, normal S1 and S2, no S3 or S4, and no murmur noted, no peripheral edema  ABDOMEN:  Slightly rounded; non-distended with normal bowel sounds; soft,  non-tender, no guarding/rebound.  WOUND: Incision edges well approximated without erythema/drainage. No hyperthermia, induration or fluctuance.  Staples present and removed with minor remaining scabbing over the laceration line. +TTP to head  NEUROLOGIC:  Awake, alert, oriented to name, place and time. Motor is 5 out of 5 bilaterally. Sensory is intact.    EXTREMITY: PMS intact. Strength 5/5 throughout and ROM WNL. L elbow with some very mild residual edema and TTP, ROM intact to elbow joint   SKIN:  normal skin color, texture, turgor unless otherwise noted for scalp laceration     Procedure:  Tolerated staple removal x 10 with no complication.     Radiology:   Radiology reports reviewed: yes - CT head 8/15 results with no residual SAH     Labs:   Laboratory Studies reviewed: yes -   CBC:   Lab Results   Component Value Date/Time    WBC 6.8 08/03/2024 04:40 AM    WBC 6.5 08/02/2024 01:46 PM    HGB 13.3 08/03/2024 04:40 AM    HGB 14.4 08/02/2024 01:46 PM    HCT 39.7 08/03/2024 04:40 AM    HCT 43.1 08/02/2024 01:46 PM     08/03/2024 04:40 AM     08/02/2024 01:46 PM     BMP:    Lab Results  risk factors

## 2024-09-03 ENCOUNTER — TRANSCRIPTION ENCOUNTER (OUTPATIENT)
Age: 39
End: 2024-09-03

## 2024-09-10 ENCOUNTER — LABORATORY RESULT (OUTPATIENT)
Age: 39
End: 2024-09-10

## 2024-09-10 ENCOUNTER — APPOINTMENT (OUTPATIENT)
Dept: CARDIOLOGY | Facility: CLINIC | Age: 39
End: 2024-09-10
Payer: COMMERCIAL

## 2024-09-10 ENCOUNTER — NON-APPOINTMENT (OUTPATIENT)
Age: 39
End: 2024-09-10

## 2024-09-10 ENCOUNTER — RX RENEWAL (OUTPATIENT)
Age: 39
End: 2024-09-10

## 2024-09-10 VITALS
BODY MASS INDEX: 32.47 KG/M2 | OXYGEN SATURATION: 98 % | DIASTOLIC BLOOD PRESSURE: 80 MMHG | HEART RATE: 72 BPM | WEIGHT: 202 LBS | SYSTOLIC BLOOD PRESSURE: 115 MMHG | HEIGHT: 66 IN

## 2024-09-10 DIAGNOSIS — O24.419 GESTATIONAL DIABETES MELLITUS IN PREGNANCY, UNSPECIFIED CONTROL: ICD-10-CM

## 2024-09-10 DIAGNOSIS — Z98.890 OTHER SPECIFIED POSTPROCEDURAL STATES: ICD-10-CM

## 2024-09-10 DIAGNOSIS — O14.90 UNSPECIFIED PRE-ECLAMPSIA, UNSPECIFIED TRIMESTER: ICD-10-CM

## 2024-09-10 PROCEDURE — 99204 OFFICE O/P NEW MOD 45 MIN: CPT | Mod: 25

## 2024-09-10 PROCEDURE — 93000 ELECTROCARDIOGRAM COMPLETE: CPT

## 2024-09-10 PROCEDURE — 99214 OFFICE O/P EST MOD 30 MIN: CPT

## 2024-09-10 RX ORDER — LABETALOL HYDROCHLORIDE 300 MG/1
300 TABLET, FILM COATED ORAL
Qty: 270 | Refills: 1 | Status: DISCONTINUED | COMMUNITY
Start: 2024-09-10 | End: 2024-09-10

## 2024-09-10 RX ORDER — NIFEDIPINE 30 MG/1
30 TABLET, EXTENDED RELEASE ORAL
Qty: 30 | Refills: 1 | Status: ACTIVE | COMMUNITY
Start: 2024-09-10 | End: 1900-01-01

## 2024-09-10 NOTE — DISCUSSION/SUMMARY
[FreeTextEntry1] : My cumulative time spent on today's visit included: Preparation for the visit, review of the medical records, review of pertinent diagnostic studies, review and integration of laboratory data, coordination of care, examination and counseling of the patient on the above diagnosis, treatment plan and prognosis, orders of diagnostic tests and any additional lab data ordered today, medications and/or appropriate procedures and documentation in the medical records of today's visit..

## 2024-09-10 NOTE — HISTORY OF PRESENT ILLNESS
[FreeTextEntry1] : 38 yo female presents today as a new patient for postpartum end of July at [facility], HTN. OB Dr. Guzman. She gave birth 1 month ago and had no elevated blood pressures during pregnancy or delivery. She states 1-2 weeks after delivery she felt headache, right leg swelling and chest heaviness so she took her BP at home and it was elevated. She went to [place] and her BP was 160/90. Pt was admitted, gave pt magnesium and discharged on labetalol 300mg TID. Since discharge, her BP improved. Since that time she only takes labetalol 300mg when her BP is elevated. When she takes labetalol she feels she has an itchy scalp. Yesterday she took labetalol when her BP was 139/91.  She took 1 tablet of labetalol yesterday but did not take any labetalol the day before.  Patient states that she has side effects from the labetalol.  07/29/24- TTE at [place]- LVEF 60%, trace mr, mild tr 07/29/2024- Negative right venous duplex for DVT Bloodwork during hospitalization within normal limits

## 2024-09-10 NOTE — HISTORY OF PRESENT ILLNESS
[FreeTextEntry1] : 38 yo female presents today as a new patient for postpartum end of July at [facility], HTN. OB Dr. Guzman. She gave birth 1 month ago and had no elevated blood pressures during pregnancy or delivery. She states 1-2 weeks after delivery she felt headache, right leg swelling and chest heaviness so she took her BP at home and it was elevated. She went to [place] and her BP was 160/90. Pt was admitted, gave pt magnesium and discharged on labetalol 300mg TID. Since discharge, her BP improved. Since that time she only takes labetalol 300mg when her BP is elevated. When she takes labetalol she feels she has an itchy scalp. Yesterday she took labetalol when her BP was 139/91.  She took 1 tablet of labetalol yesterday but did not take any labetalol the day before.  Patient states that she has side effects from the labetalol.  07/29/24- TTE at [place]- LVEF 60%, trace mr, mild tr 07/29/2024- Negative right venous duplex for DVT Bloodwork during hospitalization within normal limits

## 2024-09-11 LAB
ALBUMIN SERPL ELPH-MCNC: 4.6 G/DL
ALP BLD-CCNC: 80 U/L
ALT SERPL-CCNC: 15 U/L
ANION GAP SERPL CALC-SCNC: 15 MMOL/L
APPEARANCE: CLEAR
AST SERPL-CCNC: 19 U/L
BACTERIA: ABNORMAL /HPF
BASOPHILS # BLD AUTO: 0.07 K/UL
BASOPHILS NFR BLD AUTO: 0.9 %
BILIRUB DIRECT SERPL-MCNC: 0.1 MG/DL
BILIRUB INDIRECT SERPL-MCNC: 0.2 MG/DL
BILIRUB SERPL-MCNC: 0.3 MG/DL
BILIRUBIN URINE: NEGATIVE
BLOOD URINE: NEGATIVE
BUN SERPL-MCNC: 11 MG/DL
CALCIUM SERPL-MCNC: 9.6 MG/DL
CAST: 0 /LPF
CHLORIDE SERPL-SCNC: 103 MMOL/L
CHOLEST SERPL-MCNC: 246 MG/DL
CK SERPL-CCNC: 181 U/L
CO2 SERPL-SCNC: 23 MMOL/L
COLOR: YELLOW
CREAT SERPL-MCNC: 0.86 MG/DL
CREAT SPEC-SCNC: 121 MG/DL
EGFR: 89 ML/MIN/1.73M2
EOSINOPHIL # BLD AUTO: 0.09 K/UL
EOSINOPHIL NFR BLD AUTO: 1.1 %
EPITHELIAL CELLS: 11 /HPF
ESTIMATED AVERAGE GLUCOSE: 120 MG/DL
GLUCOSE QUALITATIVE U: NEGATIVE MG/DL
GLUCOSE SERPL-MCNC: 90 MG/DL
HBA1C MFR BLD HPLC: 5.8 %
HCT VFR BLD CALC: 43.1 %
HDLC SERPL-MCNC: 52 MG/DL
HGB BLD-MCNC: 13.6 G/DL
IMM GRANULOCYTES NFR BLD AUTO: 0.5 %
KETONES URINE: NEGATIVE MG/DL
LDLC SERPL CALC-MCNC: 164 MG/DL
LDLC SERPL DIRECT ASSAY-MCNC: 164 MG/DL
LEUKOCYTE ESTERASE URINE: ABNORMAL
LYMPHOCYTES # BLD AUTO: 2.8 K/UL
LYMPHOCYTES NFR BLD AUTO: 34.3 %
MAGNESIUM SERPL-MCNC: 2.2 MG/DL
MAN DIFF?: NORMAL
MCHC RBC-ENTMCNC: 28.2 PG
MCHC RBC-ENTMCNC: 31.6 GM/DL
MCV RBC AUTO: 89.2 FL
MICROALBUMIN 24H UR DL<=1MG/L-MCNC: 1.5 MG/DL
MICROALBUMIN/CREAT 24H UR-RTO: 12 MG/G
MICROSCOPIC-UA: NORMAL
MONOCYTES # BLD AUTO: 0.62 K/UL
MONOCYTES NFR BLD AUTO: 7.6 %
NEUTROPHILS # BLD AUTO: 4.55 K/UL
NEUTROPHILS NFR BLD AUTO: 55.6 %
NITRITE URINE: NEGATIVE
NONHDLC SERPL-MCNC: 194 MG/DL
OSMOLALITY SERPL: 297 MOSMOL/KG
PH URINE: 6
PHOSPHATE SERPL-MCNC: 4.2 MG/DL
PLATELET # BLD AUTO: 344 K/UL
POTASSIUM SERPL-SCNC: 4.1 MMOL/L
PROT SERPL-MCNC: 7.1 G/DL
PROTEIN URINE: NEGATIVE MG/DL
RBC # BLD: 4.83 M/UL
RBC # FLD: 12.8 %
RED BLOOD CELLS URINE: 0 /HPF
SODIUM SERPL-SCNC: 141 MMOL/L
SPECIFIC GRAVITY URINE: 1.01
T3RU NFR SERPL: 1.1 TBI
T4 FREE SERPL-MCNC: 1.1 NG/DL
T4 SERPL-MCNC: 7.2 UG/DL
TRIGL SERPL-MCNC: 165 MG/DL
TSH SERPL-ACNC: 1.14 UIU/ML
URATE SERPL-MCNC: 5.7 MG/DL
UROBILINOGEN URINE: 0.2 MG/DL
WBC # FLD AUTO: 8.17 K/UL
WHITE BLOOD CELLS URINE: 5 /HPF

## 2024-09-16 ENCOUNTER — TRANSCRIPTION ENCOUNTER (OUTPATIENT)
Age: 39
End: 2024-09-16

## 2024-09-25 ENCOUNTER — TRANSCRIPTION ENCOUNTER (OUTPATIENT)
Age: 39
End: 2024-09-25

## 2024-10-09 ENCOUNTER — APPOINTMENT (OUTPATIENT)
Dept: CARDIOLOGY | Facility: CLINIC | Age: 39
End: 2024-10-09
Payer: COMMERCIAL

## 2024-10-09 ENCOUNTER — TRANSCRIPTION ENCOUNTER (OUTPATIENT)
Age: 39
End: 2024-10-09

## 2024-10-09 VITALS
OXYGEN SATURATION: 98 % | HEIGHT: 66 IN | TEMPERATURE: 98.5 F | HEART RATE: 82 BPM | DIASTOLIC BLOOD PRESSURE: 78 MMHG | WEIGHT: 205 LBS | BODY MASS INDEX: 32.95 KG/M2 | SYSTOLIC BLOOD PRESSURE: 118 MMHG

## 2024-10-09 DIAGNOSIS — O14.90 UNSPECIFIED PRE-ECLAMPSIA, UNSPECIFIED TRIMESTER: ICD-10-CM

## 2024-10-09 DIAGNOSIS — E66.811 OBESITY, CLASS 1: ICD-10-CM

## 2024-10-09 DIAGNOSIS — Z98.890 OTHER SPECIFIED POSTPROCEDURAL STATES: ICD-10-CM

## 2024-10-09 DIAGNOSIS — O24.419 GESTATIONAL DIABETES MELLITUS IN PREGNANCY, UNSPECIFIED CONTROL: ICD-10-CM

## 2024-10-09 PROCEDURE — 99214 OFFICE O/P EST MOD 30 MIN: CPT

## 2024-10-10 ENCOUNTER — TRANSCRIPTION ENCOUNTER (OUTPATIENT)
Age: 39
End: 2024-10-10

## 2024-10-11 ENCOUNTER — TRANSCRIPTION ENCOUNTER (OUTPATIENT)
Age: 39
End: 2024-10-11

## 2024-10-17 RX ORDER — SEMAGLUTIDE 0.25 MG/.5ML
0.25 INJECTION, SOLUTION SUBCUTANEOUS
Qty: 1 | Refills: 0 | Status: ACTIVE | COMMUNITY
Start: 2024-10-11 | End: 1900-01-01

## 2024-10-30 ENCOUNTER — TRANSCRIPTION ENCOUNTER (OUTPATIENT)
Age: 39
End: 2024-10-30

## 2024-11-05 NOTE — OCCUPATIONAL THERAPY INITIAL EVALUATION ADULT - GROOMING, PREVIOUS LEVEL OF FUNCTION, OT EVAL
Quality 226: Preventive Care And Screening: Tobacco Use: Screening And Cessation Intervention: Patient screened for tobacco use and is an ex/non-smoker Quality 130: Documentation Of Current Medications In The Medical Record: Current Medications Documented Detail Level: Detailed Quality 431: Preventive Care And Screening: Unhealthy Alcohol Use - Screening: Patient not identified as an unhealthy alcohol user when screened for unhealthy alcohol use using a systematic screening method independent

## 2024-11-27 ENCOUNTER — TRANSCRIPTION ENCOUNTER (OUTPATIENT)
Age: 39
End: 2024-11-27

## 2024-12-02 ENCOUNTER — TRANSCRIPTION ENCOUNTER (OUTPATIENT)
Age: 39
End: 2024-12-02

## 2024-12-23 ENCOUNTER — APPOINTMENT (OUTPATIENT)
Dept: CARDIOLOGY | Facility: CLINIC | Age: 39
End: 2024-12-23

## 2024-12-28 ENCOUNTER — TRANSCRIPTION ENCOUNTER (OUTPATIENT)
Age: 39
End: 2024-12-28

## 2024-12-31 ENCOUNTER — TRANSCRIPTION ENCOUNTER (OUTPATIENT)
Age: 39
End: 2024-12-31

## 2025-05-27 NOTE — PATIENT PROFILE ADULT - HAS THE PATIENT RECEIVED THE INFLUENZA VACCINE THIS SEASON?
Copied from CRM #[id number]. Topic: MW Messaging - MW Patient Request  >> May 27, 2025  1:32 PM Cindy BOYLE wrote:  --DO NOT REPLY - Sent from PACT - If sent to wrong pool, reroute to P ECO Reroute pool --    General Message: Patient returning call for results    Callback #: [phone]   Can a detailed message be left? Yes - Voicemail   Caller has been advised this message will be addressed within:2-3 business days [routine]         no...

## (undated) DEVICE — PREP CHLORAPREP HI-LITE ORANGE 26ML

## (undated) DEVICE — STRYKER INTERPULSE HANDPIECE W IRR SUCTION TUBE

## (undated) DEVICE — VENODYNE/SCD SLEEVE CALF MEDIUM

## (undated) DEVICE — ELCTR BOVIE PENCIL HANDPIECE

## (undated) DEVICE — DRSG XEROFORM 1 X 8"

## (undated) DEVICE — POSITIONER FOAM EGG CRATE ULNAR 2PCS (PINK)

## (undated) DEVICE — Device

## (undated) DEVICE — APPLICATOR ESWAB 1ML LIQUID AMIES REG

## (undated) DEVICE — MIDAS REX MR8 BALL FLUTED SM BORE 3MM X 10CM

## (undated) DEVICE — DRSG BIOPATCH DISK W CHG 1" W 4.0MM HOLE

## (undated) DEVICE — WARMING BLANKET UPPER ADULT

## (undated) DEVICE — SUT VICRYL 0 18" OS-6 (POP-OFF)

## (undated) DEVICE — DRAPE C ARM 41X140"

## (undated) DEVICE — DRAPE 3/4 SHEET W REINFORCEMENT 56X77"

## (undated) DEVICE — DRSG TAPE HYPAFIX 4"

## (undated) DEVICE — STAPLER SKIN VISI-STAT 35 WIDE

## (undated) DEVICE — POSITIONER CUSHION INSERT PRONE VIEW LG

## (undated) DEVICE — GLV 7.5 PROTEXIS (WHITE)

## (undated) DEVICE — SUT VICRYL 2-0 18" CP-2 UNDYED (POP-OFF)

## (undated) DEVICE — BIPOLAR FORCEP SYMMETRY BAYONET 7" X 1.5MM SMOOTH (SILVER)

## (undated) DEVICE — MIDAS REX MR8 BALL FLUTED SM BORE 4MM X 10CM

## (undated) DEVICE — SOL IRR POUR H2O 250ML

## (undated) DEVICE — SUT MONOSOF 2-0 18" C-15

## (undated) DEVICE — GOWN TRIMAX LG

## (undated) DEVICE — MARKING PEN W RULER

## (undated) DEVICE — SUT VLOC 180 0 9" GS-21 GREEN

## (undated) DEVICE — SYR LUER LOK 20CC

## (undated) DEVICE — DRAPE MAYO STAND 30"

## (undated) DEVICE — DRAPE LIGHT HANDLE COVER (GREEN)

## (undated) DEVICE — SOL IRR POUR NS 0.9% 500ML

## (undated) DEVICE — ELCTR BOVIE TIP BLADE INSULATED 4" EDGE

## (undated) DEVICE — SUT PDS II 2-0 27" CT-1

## (undated) DEVICE — DRSG TELFA 3 X 8

## (undated) DEVICE — PACK LUMBAR LAMI

## (undated) DEVICE — SPECIMEN CONTAINER 100ML

## (undated) DEVICE — FOLEY TRAY 16FR LF URINE METER SURESTEP

## (undated) DEVICE — DRAPE TOWEL BLUE 17" X 24"

## (undated) DEVICE — ELCTR BOVIE TIP BLADE INSULATED 2.75" EDGE

## (undated) DEVICE — SYR TB 1CC 25G X 5/8 (BLUE)

## (undated) DEVICE — DRSG AQUACEL 3.5 X 10"

## (undated) DEVICE — DRAPE 1/2 SHEET 40X57"

## (undated) DEVICE — DRAIN JACKSON PRATT 3 SPRING RESERVOIR W 10FR PVC DRAIN

## (undated) DEVICE — VENODYNE/SCD SLEEVE CALF LARGE

## (undated) DEVICE — SURGIPHOR STERILE WOUND IRR POVIDONE IODINE